# Patient Record
Sex: MALE | Race: WHITE | NOT HISPANIC OR LATINO | Employment: FULL TIME | URBAN - METROPOLITAN AREA
[De-identification: names, ages, dates, MRNs, and addresses within clinical notes are randomized per-mention and may not be internally consistent; named-entity substitution may affect disease eponyms.]

---

## 2017-01-19 ENCOUNTER — HOSPITAL ENCOUNTER (OUTPATIENT)
Dept: LAB | Facility: MEDICAL CENTER | Age: 52
End: 2017-01-19
Attending: NURSE PRACTITIONER
Payer: OTHER GOVERNMENT

## 2017-01-19 DIAGNOSIS — Z12.5 SCREENING PSA (PROSTATE SPECIFIC ANTIGEN): ICD-10-CM

## 2017-01-19 DIAGNOSIS — E78.5 HYPERLIPIDEMIA: Chronic | ICD-10-CM

## 2017-01-19 DIAGNOSIS — I10 ESSENTIAL HYPERTENSION: ICD-10-CM

## 2017-01-19 LAB
ALBUMIN SERPL BCP-MCNC: 4.3 G/DL (ref 3.2–4.9)
ALBUMIN/GLOB SERPL: 1.4 G/DL
ALP SERPL-CCNC: 113 U/L (ref 30–99)
ALT SERPL-CCNC: 24 U/L (ref 2–50)
ANION GAP SERPL CALC-SCNC: 7 MMOL/L (ref 0–11.9)
AST SERPL-CCNC: 25 U/L (ref 12–45)
BILIRUB SERPL-MCNC: 0.4 MG/DL (ref 0.1–1.5)
BUN SERPL-MCNC: 22 MG/DL (ref 8–22)
CALCIUM SERPL-MCNC: 9 MG/DL (ref 8.5–10.5)
CHLORIDE SERPL-SCNC: 103 MMOL/L (ref 96–112)
CHOLEST SERPL-MCNC: 163 MG/DL (ref 100–199)
CO2 SERPL-SCNC: 26 MMOL/L (ref 20–33)
CREAT SERPL-MCNC: 0.93 MG/DL (ref 0.5–1.4)
GLOBULIN SER CALC-MCNC: 3 G/DL (ref 1.9–3.5)
GLUCOSE SERPL-MCNC: 94 MG/DL (ref 65–99)
HDLC SERPL-MCNC: 31 MG/DL
LDLC SERPL CALC-MCNC: 111 MG/DL
POTASSIUM SERPL-SCNC: 4 MMOL/L (ref 3.6–5.5)
PROT SERPL-MCNC: 7.3 G/DL (ref 6–8.2)
PSA SERPL DL<=0.01 NG/ML-MCNC: 0.3 NG/ML (ref 0–4)
SODIUM SERPL-SCNC: 136 MMOL/L (ref 135–145)
TRIGL SERPL-MCNC: 106 MG/DL (ref 0–149)

## 2017-01-19 PROCEDURE — 84153 ASSAY OF PSA TOTAL: CPT

## 2017-01-19 PROCEDURE — 80061 LIPID PANEL: CPT

## 2017-01-19 PROCEDURE — 80053 COMPREHEN METABOLIC PANEL: CPT

## 2017-01-19 PROCEDURE — 36415 COLL VENOUS BLD VENIPUNCTURE: CPT

## 2017-01-19 NOTE — TELEPHONE ENCOUNTER
Was the patient seen in the last year in this department? Yes 08/30/2016    Does patient have an active prescription for medications requested? No     Received Request Via: Patient

## 2017-01-20 ENCOUNTER — TELEPHONE (OUTPATIENT)
Dept: MEDICAL GROUP | Facility: PHYSICIAN GROUP | Age: 52
End: 2017-01-20

## 2017-01-20 DIAGNOSIS — E78.5 HYPERLIPIDEMIA, UNSPECIFIED HYPERLIPIDEMIA TYPE: ICD-10-CM

## 2017-01-20 RX ORDER — LISINOPRIL AND HYDROCHLOROTHIAZIDE 25; 20 MG/1; MG/1
TABLET ORAL
Qty: 90 TAB | Refills: 0 | Status: SHIPPED | OUTPATIENT
Start: 2017-01-20 | End: 2017-04-16 | Stop reason: SDUPTHER

## 2017-01-20 NOTE — TELEPHONE ENCOUNTER
----- Message from MENDEZ Kumar sent at 1/20/2017 12:24 PM PST -----  Please call patient. I have reviewed their most recent lab results. They are within normal limits except for slightly elevated cholesterol and alkaline phosphatase. No medications are necessary at this time, but labs may be repeated in 3 months.  Followup as discussed.    Please message me with any questions or concerns.

## 2017-01-20 NOTE — TELEPHONE ENCOUNTER
Refill X 6 months, sent to pharmacy.Pt. Seen in the last 6 months per protocol.   Lab Results   Component Value Date/Time    SODIUM 136 01/19/2017 09:41 AM    POTASSIUM 4.0 01/19/2017 09:41 AM    CHLORIDE 103 01/19/2017 09:41 AM    CO2 26 01/19/2017 09:41 AM    GLUCOSE 94 01/19/2017 09:41 AM    BUN 22 01/19/2017 09:41 AM    CREATININE 0.93 01/19/2017 09:41 AM

## 2017-02-06 ENCOUNTER — OFFICE VISIT (OUTPATIENT)
Dept: URGENT CARE | Facility: CLINIC | Age: 52
End: 2017-02-06
Payer: OTHER GOVERNMENT

## 2017-02-06 ENCOUNTER — APPOINTMENT (OUTPATIENT)
Dept: RADIOLOGY | Facility: IMAGING CENTER | Age: 52
End: 2017-02-06
Attending: NURSE PRACTITIONER
Payer: OTHER GOVERNMENT

## 2017-02-06 VITALS
BODY MASS INDEX: 33.2 KG/M2 | SYSTOLIC BLOOD PRESSURE: 120 MMHG | TEMPERATURE: 98.4 F | HEIGHT: 75 IN | WEIGHT: 267 LBS | OXYGEN SATURATION: 98 % | DIASTOLIC BLOOD PRESSURE: 60 MMHG | HEART RATE: 100 BPM | RESPIRATION RATE: 20 BRPM

## 2017-02-06 DIAGNOSIS — R11.0 NAUSEA: ICD-10-CM

## 2017-02-06 DIAGNOSIS — K59.00 CONSTIPATION, UNSPECIFIED CONSTIPATION TYPE: ICD-10-CM

## 2017-02-06 PROCEDURE — 99213 OFFICE O/P EST LOW 20 MIN: CPT | Performed by: NURSE PRACTITIONER

## 2017-02-06 PROCEDURE — 74020 DX-ABDOMEN-2 VIEWS: CPT | Mod: TC | Performed by: NURSE PRACTITIONER

## 2017-02-06 RX ORDER — ONDANSETRON 4 MG/1
4 TABLET, FILM COATED ORAL EVERY 6 HOURS PRN
Qty: 12 TAB | Refills: 0 | Status: SHIPPED | OUTPATIENT
Start: 2017-02-06 | End: 2017-10-18

## 2017-02-06 ASSESSMENT — ENCOUNTER SYMPTOMS
DIARRHEA: 0
SORE THROAT: 0
MYALGIAS: 0
CONSTIPATION: 1
NAUSEA: 1
FEVER: 0
VOMITING: 0
BRUISES/BLEEDS EASILY: 0
CHILLS: 0
COUGH: 0
ABDOMINAL PAIN: 0

## 2017-02-06 NOTE — MR AVS SNAPSHOT
"        Dwight Linares   2017 10:30 AM   Office Visit   MRN: [id number]    Department:  [facility]   Dept Phone:  [phone]    Description:  Male : 1965   Provider:  FRANCES Camarena           Reason for Visit     Abdominal Pain aabd pain and bloating, some diarrhea      Allergies as of 2017     No Known Allergies      You were diagnosed with     Nausea   [363731]       Constipation, unspecified constipation type   [4273061]         Vital Signs     Blood Pressure Pulse Temperature Respirations Height Weight    120/60 mmHg 100 36.9 °C (98.4 °F) 20 1.905 m (6' 3\") 121.11 kg (267 lb)    Body Mass Index Oxygen Saturation Smoking Status             33.37 kg/m2 98% Former Smoker         Basic Information     Date Of Birth Sex Race Ethnicity Preferred Language    1965 Male White Non- English      Problem List              ICD-10-CM Priority Class Noted - Resolved    Hypertension I10   10/14/2014 - Present    Rash R21   10/14/2014 - Present    Chronic low back pain M54.5, G89.29   10/14/2014 - Present    Hyperlipidemia E78.5   10/14/2014 - Present    Environmental allergies Z91.09   2016 - Present      Health Maintenance        Date Due Completion Dates    IMM DTaP/Tdap/Td Vaccine (1 - Tdap) 10/24/1983 ---    IMM INFLUENZA (1) 2016 10/14/2014    COLONOSCOPY 2021            Current Immunizations     Influenza Vaccine Quad Inj (Pf) 10/14/2014      Below and/or attached are the medications your provider expects you to take. Review all of your home medications and newly ordered medications with your provider and/or pharmacist. Follow medication instructions as directed by your provider and/or pharmacist. Please keep your medication list with you and share with your provider. Update the information when medications are discontinued, doses are changed, or new medications (including over-the-counter products) are added; and carry medication information at all " times in the event of emergency situations     Allergies:  No Known Allergies          Medications  Valid as of: February 06, 2017 - 11:28 AM    Generic Name Brand Name Tablet Size Instructions for use    Lisinopril-Hydrochlorothiazide (Tab) PRINZIDE, ZESTORETIC 20-25 MG TAKE 1 TABLET BY MOUTH DAILY        Naproxen (Tab) NAPROSYN 500 MG Take 1 Tab by mouth 2 times a day, with meals.        .                 Medicines prescribed today were sent to:     [id number] - [place], [place] - [address]    [address] [place] [address]    Phone: [phone] Fax: [phone]    Open 24 Hours?: No      Medication refill instructions:       If your prescription bottle indicates you have medication refills left, it is not necessary to call your provider’s office. Please contact your pharmacy and they will refill your medication.    If your prescription bottle indicates you do not have any refills left, you may request refills at any time through one of the following ways: The online Workhint system (except Urgent Care), by calling your provider’s office, or by asking your pharmacy to contact your provider’s office with a refill request. Medication refills are processed only during regular business hours and may not be available until the next business day. Your provider may request additional information or to have a follow-up visit with you prior to refilling your medication.   *Please Note: Medication refills are assigned a new Rx number when refilled electronically. Your pharmacy may indicate that no refills were authorized even though a new prescription for the same medication is available at the pharmacy. Please request the medicine by name with the pharmacy before contacting your provider for a refill.        Your To Do List     Future Labs/Procedures Complete By Expires    [id number]-[id number]-0 VIEWS  As directed 8/9/2017         Workhint Access Code: [email or web address]  Expires: 2/18/2017  9:30 AM    Tamara MILLIGAN  secure, online tool to manage your health information     [employer]’s Moneylib® is a secure, online tool that connects you to your personalized health information from the privacy of your home -- day or night - making it very easy for you to manage your healthcare. Once the activation process is completed, you can even access your medical information using the Moneylib poppy, which is available for free in the Apple Poppy store or Google Play store.     Moneylib provides the following levels of access (as shown below):   My Chart Features   [facility] Primary Care Doctor [facility]  Specialists [facility]  Urgent  Care [facility]  Primary Care  Doctor   Email your healthcare team securely and privately 24/7 X X X    Manage appointments: schedule your next appointment; view details of past/upcoming appointments X      Request prescription refills. X      View recent personal medical records, including lab and immunizations X X X X   View health record, including health history, allergies, medications X X X X   Read reports about your outpatient visits, procedures, consult and ER notes X X X X   See your discharge summary, which is a recap of your hospital and/or ER visit that includes your diagnosis, lab results, and care plan. X X       How to register for Moneylib:  1. Go to  [email or web address].  2. Click on the Sign Up Now box, which takes you to the New Member Sign Up page. You will need to provide the following information:  a. Enter your Moneylib Access Code exactly as it appears at the top of this page. (You will not need to use this code after you’ve completed the sign-up process. If you do not sign up before the expiration date, you must request a new code.)   b. Enter your date of birth.   c. Enter your home email address.   d. Click Submit, and follow the next screen’s instructions.  3. Create a Moneylib ID. This will be your Moneylib login ID and cannot be changed, so think of one that is secure and easy to  remember.  4. Create a [employer] password. You can change your password at any time.  5. Enter your Password Reset Question and Answer. This can be used at a later time if you forget your password.   6. Enter your e-mail address. This allows you to receive e-mail notifications when new information is available in [employer].  7. Click Sign Up. You can now view your health information.    For assistance activating your [employer]MeetEast account, call [phone]

## 2017-02-06 NOTE — PROGRESS NOTES
"Subjective:      Dwight Linares is a 51 y.o. male who presents with Abdominal Pain            HPI Comments: Patient states approximately 5 days ago he had some abdominal pain and bloating. He took MiraLAX for several days and had diarrhea. Patient states he believes he was dehydrated and drank a large amount of liquid yesterday. Now he is complaining of nausea and lack of energy. Patient is now having nausea without any pain. He is having normal stools. Not eating because of loss of appetite.    Medications, Allergies and Prior Medical Hx reviewed and updated in [facility].with patient/family today   Patient denies any previous abdominal problems or surgeries.    Nausea  This is a new problem. The current episode started in the past 7 days (5 days). The problem occurs constantly. The problem has been unchanged. Associated symptoms include nausea. Pertinent negatives include no abdominal pain, chest pain, chills, coughing, fever, myalgias, sore throat or vomiting. Nothing aggravates the symptoms. He has tried nothing for the symptoms. The treatment provided no relief.       Review of Systems   Constitutional: Negative for fever, chills and malaise/fatigue.   HENT: Negative for sore throat.    Respiratory: Negative for cough.    Cardiovascular: Negative for chest pain.   Gastrointestinal: Positive for nausea and constipation. Negative for vomiting, abdominal pain and diarrhea.   Genitourinary: Negative for dysuria, frequency and hematuria.   Musculoskeletal: Negative for myalgias.   Endo/Heme/Allergies: Does not bruise/bleed easily.          Objective:     /60 mmHg  Pulse 100  Temp(Src) 36.9 °C (98.4 °F)  Resp 20  Ht 1.905 m (6' 3\")  Wt 121.11 kg (267 lb)  BMI 33.37 kg/m2  SpO2 98%     Physical Exam   Constitutional: He appears well-developed and well-nourished.   HENT:   Head: Normocephalic.   Neck: Normal range of motion. Neck supple.   Pulmonary/Chest: Effort normal and breath sounds normal. No respiratory " distress.   Abdominal: Soft. Bowel sounds are increased. There is no tenderness.   Neurological: He is alert.   Awake, alert, answering questions appropriately, moving all extremeties   Skin: Skin is warm and dry.   Psychiatric: He has a normal mood and affect. His behavior is normal.   Vitals reviewed.              Assessment/Plan:     1. Nausea  [id number] VIEWS    ondansetron (ZOFRAN) 4 MG Tab tablet   2. Constipation, unspecified constipation type  [id number] VIEWS    ondansetron (ZOFRAN) 4 MG Tab tablet       Xray of abdomen 2 view -  Reviewed film and radiology interpretation:   There is a nonobstructive nonspecific bowel gas pattern.  There is no evidence of free intraperitoneal air.    Letter written for 2-3 days off of school/work    Clear Liquid Diet adv as tolerated to bland solid food and then to normal diet  Pt will go to the ER for worsening or changing symptoms as discussed, fevers, vomiting, worsening pain, or any other concerns  Follow-up with your primary care provider or return here if not improving in 2 days   Discharge instructions discussed with pt/family who verbalize understanding and agreement with poc

## 2017-02-06 NOTE — Clinical Note
February 6, 2017       Patient: Dwight Linares   YOB: 1965   Date of Visit: 2/6/2017         To Whom It May Concern:    It is my medical opinion that Dwight Linares should be off work 1-2 days due to illness.     If you have any questions or concerns, please don't hesitate to call [phone]          Sincerely,          MOE Camarena.  Electronically Signed

## 2017-08-09 RX ORDER — LISINOPRIL AND HYDROCHLOROTHIAZIDE 25; 20 MG/1; MG/1
TABLET ORAL
Qty: 90 TAB | Refills: 0 | Status: SHIPPED | OUTPATIENT
Start: 2017-08-09 | End: 2017-10-18 | Stop reason: SDUPTHER

## 2017-08-09 NOTE — TELEPHONE ENCOUNTER
Was the patient seen in the last year in this department? Yes     Does patient have an active prescription for medications requested? No     Received Request Via: Pharmacy      Pt met protocol?: No    LAST OV 08/30/2016    BP Readings from Last 1 Encounters:   02/06/17 120/60

## 2017-09-11 ENCOUNTER — TELEPHONE (OUTPATIENT)
Dept: MEDICAL GROUP | Facility: PHYSICIAN GROUP | Age: 52
End: 2017-09-11

## 2017-09-11 NOTE — TELEPHONE ENCOUNTER
ESTABLISHED PATIENT PRE-VISIT PLANNING     Note: Patient will not be contacted if there is no indication to call.     1.  Reviewed notes from the last few office visits within the medical group: Yes    2.  If any orders were placed at last visit or intended to be done for this visit (i.e. 6 mos follow-up), do we have Results/Consult Notes?        •  Labs - Labs ordered, completed and results are in chart.       •  Imaging - Imaging ordered, completed and results are in chart.       •  Referrals - Referral ordered, patient has NOT been seen.    3. Is this appointment scheduled as a Hospital Follow-Up? No    4.  Immunizations were updated in Epic using WebIZ?: Epic matches WebIZ       •  Web Iz Recommendations: FLU and TDAP    5.  Patient is due for the following Health Maintenance Topics:   Health Maintenance Due   Topic Date Due   • IMM DTaP/Tdap/Td Vaccine (1 - Tdap) 10/24/1983   • IMM INFLUENZA (1) 09/01/2017           6.  Patient was informed to arrive 15 min prior to their scheduled appointment and bring in their medication bottles.

## 2017-10-18 ENCOUNTER — OFFICE VISIT (OUTPATIENT)
Dept: MEDICAL GROUP | Facility: PHYSICIAN GROUP | Age: 52
End: 2017-10-18
Payer: OTHER GOVERNMENT

## 2017-10-18 VITALS
SYSTOLIC BLOOD PRESSURE: 110 MMHG | TEMPERATURE: 98.2 F | HEART RATE: 80 BPM | BODY MASS INDEX: 32.45 KG/M2 | RESPIRATION RATE: 16 BRPM | DIASTOLIC BLOOD PRESSURE: 72 MMHG | WEIGHT: 261 LBS | OXYGEN SATURATION: 96 % | HEIGHT: 75 IN

## 2017-10-18 DIAGNOSIS — Z23 NEED FOR INFLUENZA VACCINATION: ICD-10-CM

## 2017-10-18 DIAGNOSIS — I10 ESSENTIAL HYPERTENSION: ICD-10-CM

## 2017-10-18 DIAGNOSIS — Z80.42 FAMILY HISTORY OF PROSTATE CANCER IN FATHER: ICD-10-CM

## 2017-10-18 DIAGNOSIS — L30.9 ECZEMA, UNSPECIFIED TYPE: ICD-10-CM

## 2017-10-18 DIAGNOSIS — N52.9 ERECTILE DYSFUNCTION, UNSPECIFIED ERECTILE DYSFUNCTION TYPE: ICD-10-CM

## 2017-10-18 DIAGNOSIS — E66.9 OBESITY (BMI 30-39.9): ICD-10-CM

## 2017-10-18 PROCEDURE — 99214 OFFICE O/P EST MOD 30 MIN: CPT | Mod: 25 | Performed by: NURSE PRACTITIONER

## 2017-10-18 PROCEDURE — 90471 IMMUNIZATION ADMIN: CPT | Performed by: NURSE PRACTITIONER

## 2017-10-18 PROCEDURE — 90686 IIV4 VACC NO PRSV 0.5 ML IM: CPT | Performed by: NURSE PRACTITIONER

## 2017-10-18 RX ORDER — FEXOFENADINE HCL 60 MG/1
60 TABLET, FILM COATED ORAL DAILY
COMMUNITY
End: 2019-11-21

## 2017-10-18 RX ORDER — LISINOPRIL AND HYDROCHLOROTHIAZIDE 25; 20 MG/1; MG/1
1 TABLET ORAL
Qty: 90 TAB | Refills: 1 | Status: SHIPPED | OUTPATIENT
Start: 2017-10-18 | End: 2018-05-11 | Stop reason: SDUPTHER

## 2017-10-18 RX ORDER — SILDENAFIL 50 MG/1
50 TABLET, FILM COATED ORAL PRN
Qty: 5 TAB | Refills: 3 | Status: SHIPPED | OUTPATIENT
Start: 2017-10-18 | End: 2019-11-21

## 2017-10-18 RX ORDER — TRIAMCINOLONE ACETONIDE 1 MG/G
CREAM TOPICAL
Qty: 30 G | Refills: 1 | Status: SHIPPED | OUTPATIENT
Start: 2017-10-18 | End: 2017-11-15 | Stop reason: SDUPTHER

## 2017-10-18 RX ORDER — CHLORAL HYDRATE 500 MG
1000 CAPSULE ORAL
COMMUNITY
End: 2022-05-11

## 2017-10-18 ASSESSMENT — PATIENT HEALTH QUESTIONNAIRE - PHQ9: CLINICAL INTERPRETATION OF PHQ2 SCORE: 0

## 2017-10-18 NOTE — PATIENT INSTRUCTIONS
Kenalog cream for eczema--apply to affected area twice daily for no more than 4 weeks at a time    Viagra, 50 mg daily--ER precautions    Labs anytime in the next week    Flu shot today    Referral to dermatology    Follow up with me in 3 months, sooner if needed

## 2017-10-18 NOTE — ASSESSMENT & PLAN NOTE
This is a chronic condition, uncontrolled. Patient's weight is 261 pounds, BMI 32.62. He reports to me that he understands the risks associated with this weight and he struggles to lose weight. We did discussed several strategies for weight loss, but ultimately discussed that to lose weight. Needs to be a decrease in calories as well as carbohydrates. He understands that he needs to increase his physical activity as well. He states he is going to work on this and we will reassess at follow-up appointment.

## 2017-10-18 NOTE — ASSESSMENT & PLAN NOTE
Patient reports is a relatively new issue. He is newly , but he and his wife live in separate states for now until she can move here. He reports that when he is with her though he does not have difficulty initiating an erection, but does have difficulty maintaining. He is concerned that his medications may be causing this. He does not report any urinary symptoms, but does report a family history of prostate cancer in a first-degree relative--specifically his father.

## 2017-10-18 NOTE — ASSESSMENT & PLAN NOTE
Patient reports family history of prostate cancer in first-degree relative, specifically his father. He is wanting to know if he needs PSA screening. He does deny any symptoms of prostate cancer, he denies any urinary symptoms.

## 2017-10-18 NOTE — ASSESSMENT & PLAN NOTE
This is a chronic condition, stable, poor to fair control as he has not been using steroid cream consistently. He has been on steroid cream in the past as prescribed by his dermatologist in [place]. He does need new prescription, triamcinolone 0.1% was called into his pharmacy. He was advised to use this twice a day, for no more than 4 weeks time. He is also requesting referral to new dermatologist, this was placed for him. He was also advised to keep skin well moisturized.

## 2017-10-18 NOTE — ASSESSMENT & PLAN NOTE
This is a chronic condition, stable and fairly well-controlled on lisinopril-hydrochlorothiazide. Blood pressure today is 110/72 and he denies symptoms of hypertension. He is due for labs, these have been ordered. He does need refills, these were called in for him.

## 2017-10-18 NOTE — PROGRESS NOTES
Chief Complaint   Patient presents with   • Referral Needed     rash, referral to Derm, weight loss   • Personal Problem         This is a 51 y.o.male patient that presents today with the following: Establish care with new PCP, discuss acute and chronic conditions, medication refills    Eczema  This is a chronic condition, stable, poor to fair control as he has not been using steroid cream consistently. He has been on steroid cream in the past as prescribed by his dermatologist in [place]. He does need new prescription, triamcinolone 0.1% was called into his pharmacy. He was advised to use this twice a day, for no more than 4 weeks time. He is also requesting referral to new dermatologist, this was placed for him. He was also advised to keep skin well moisturized.    Erectile dysfunction  Patient reports is a relatively new issue. He is newly , but he and his wife live in separate states for now until she can move here. He reports that when he is with her though he does not have difficulty initiating an erection, but does have difficulty maintaining. He is concerned that his medications may be causing this. He does not report any urinary symptoms, but does report a family history of prostate cancer in a first-degree relative--specifically his father.    Family history of prostate cancer in father  Patient reports family history of prostate cancer in first-degree relative, specifically his father. He is wanting to know if he needs PSA screening. He does deny any symptoms of prostate cancer, he denies any urinary symptoms.    Hypertension  This is a chronic condition, stable and fairly well-controlled on lisinopril-hydrochlorothiazide. Blood pressure today is 110/72 and he denies symptoms of hypertension. He is due for labs, these have been ordered. He does need refills, these were called in for him.    Obesity (BMI 30-39.9)  This is a chronic condition, uncontrolled. Patient's weight is 261 pounds, BMI 32.62. He  reports to me that he understands the risks associated with this weight and he struggles to lose weight. We did discussed several strategies for weight loss, but ultimately discussed that to lose weight. Needs to be a decrease in calories as well as carbohydrates. He understands that he needs to increase his physical activity as well. He states he is going to work on this and we will reassess at follow-up appointment.      No visits with results within 1 Month(s) from this visit.   Latest known visit with results is:   Hospital Outpatient Visit on 01/19/2017   Component Date Value   • Sodium 01/19/2017 136    • Potassium 01/19/2017 4.0    • Chloride 01/19/2017 103    • Co2 01/19/2017 26    • Anion Gap 01/19/2017 7.0    • Glucose 01/19/2017 94    • Bun 01/19/2017 22    • Creatinine 01/19/2017 0.93    • Calcium 01/19/2017 9.0    • AST(SGOT) 01/19/2017 25    • ALT(SGPT) 01/19/2017 24    • Alkaline Phosphatase 01/19/2017 113*   • Total Bilirubin 01/19/2017 0.4    • Albumin 01/19/2017 4.3    • Total Protein 01/19/2017 7.3    • Globulin 01/19/2017 3.0    • A-G Ratio 01/19/2017 1.4    • Cholesterol,Tot 01/19/2017 163    • Triglycerides 01/19/2017 106    • HDL 01/19/2017 31*   • LDL 01/19/2017 111*   • Prostatic Specific Antig* 01/19/2017 0.30    • GFR If  01/19/2017 >60    • GFR If Non  Ameri* 01/19/2017 >60          clinical course has been stable    Past Medical History:   Diagnosis Date   • Hypertension 10/14/2014       Past Surgical History:   Procedure Laterality Date   • DENTAL SURGERY         Family History   Problem Relation Age of Onset   • Heart Disease Father      dysrhythmia with pacer   • Cancer Father      skin cancer   • Cancer Maternal Grandfather      leukemia   • Alcohol/Drug Paternal Grandfather    • Diabetes Neg Hx    • Stroke Neg Hx        Review of patient's allergies indicates no known allergies.    Current Outpatient Prescriptions Ordered in [facility]   Medication Sig Dispense  "Refill   • Omega-3 Fatty Acids (FISH OIL) 1000 MG Cap capsule Take 1,000 mg by mouth 3 times a day, with meals.     • fexofenadine (ALLEGRA) 60 MG Tab Take 60 mg by mouth every day.     • triamcinolone acetonide (KENALOG) 0.1 % Cream Apply to affected area twice daily, no more than 4 weeks at a time 30 g 1   • sildenafil citrate (VIAGRA) 50 MG tablet Take 1 Tab by mouth as needed for Erectile Dysfunction. 5 Tab 3   • lisinopril-hydrochlorothiazide (PRINZIDE, ZESTORETIC) 20-25 MG per tablet Take 1 Tab by mouth every day. 90 Tab 1     No current Epic-ordered facility-administered medications on file.        Constitutional ROS: No unexpected change in weight, No weakness, No unexplained fevers, sweats, or chills  Pulmonary ROS: No chronic cough, sputum, or hemoptysis, No shortness of breath, No recent change in breathing  Cardiovascular ROS: No chest pain, No edema, No palpitations, Positive for hypertension, Controlled  Gastrointestinal ROS: No abdominal pain, No nausea, vomiting, diarrhea, or constipation, no blood in stool  Musculoskeletal/Extremities ROS: No clubbing, No peripheral edema, No pain, redness or swelling on the joints  Skin/Integumentary ROS: Positive for eczema, per history of present illness  Neurologic ROS: Normal development, No seizures, No weakness  Genitourinary ROS: Positive per history of present illness    Physical exam:  /72   Pulse 80   Temp 36.8 °C (98.2 °F)   Resp 16   Ht 1.905 m (6' 3\")   Wt 118.4 kg (261 lb)   SpO2 96%   BMI 32.62 kg/m²   General Appearance: Middle-age male, alert, no distress, obese, well-groomed  Skin: positives: Rash, likely eczematous, mostly to upper extremities and upper back  Lungs: negative findings: normal respiratory rate and rhythm, lungs clear to auscultation  Heart: negative. RRR without murmur, gallop, or rubs.  No ectopy.  Abdomen: Abdomen soft, non-tender. BS normal. No masses,  No organomegaly  Musculoskeletal: negative findings: ROM of all " joints is normal, no evidence of joint instability, strength normal, no deformities present  Neurologic: intact, oriented, mood appropriate, judgment intact. Cranial nerves II-12 grossly intact    Medical decision making/discussion: Called and Kenalog cream for eczema, he is to use this twice a day for no more than 4 weeks at a time. Referral has been placed to dermatology. He is to have labs done in the next week and I will notify him of results and further actions if needed. He will get flu shot today. We'll have him try Viagra, 50 mg as needed. I did discuss risks, benefits and side effects of medication and he was given ER precautions for prolonged erection. We discussed weight loss strategies, he is going to work on decreasing calories, and carbohydrates. He is going to try to increase his physical activity as well.    Dwight was seen today for referral needed and personal problem.    Diagnoses and all orders for this visit:    Essential hypertension  -     COMP METABOLIC PANEL; Future  -     LIPID PROFILE; Future  -     lisinopril-hydrochlorothiazide (PRINZIDE, ZESTORETIC) 20-25 MG per tablet; Take 1 Tab by mouth every day.    Eczema, unspecified type  -     triamcinolone acetonide (KENALOG) 0.1 % Cream; Apply to affected area twice daily, no more than 4 weeks at a time  -     REFERRAL TO DERMATOLOGY    Erectile dysfunction, unspecified erectile dysfunction type  -     PROSTATE SPECIFIC AG DIAGNOSTIC; Future  -     TESTOSTERONE, FREE AND TOTAL  -     sildenafil citrate (VIAGRA) 50 MG tablet; Take 1 Tab by mouth as needed for Erectile Dysfunction.    Family history of prostate cancer in father  -     PROSTATE SPECIFIC AG DIAGNOSTIC; Future    Obesity (BMI 30-39.9)  -     Patient identified as having weight management issue.  Appropriate orders and counseling given.    Need for influenza vaccination  -     INFLUENZA VACCINE QUAD INJ >3Y(PF)          Please note that this dictation was created using voice  recognition software. I have made every reasonable attempt to correct obvious errors, but I expect that there are errors of grammar and possibly content that I did not discover before finalizing the note.

## 2017-10-20 ENCOUNTER — HOSPITAL ENCOUNTER (OUTPATIENT)
Dept: LAB | Facility: MEDICAL CENTER | Age: 52
End: 2017-10-20
Attending: NURSE PRACTITIONER
Payer: OTHER GOVERNMENT

## 2017-10-20 DIAGNOSIS — Z80.42 FAMILY HISTORY OF PROSTATE CANCER IN FATHER: ICD-10-CM

## 2017-10-20 DIAGNOSIS — N52.9 ERECTILE DYSFUNCTION, UNSPECIFIED ERECTILE DYSFUNCTION TYPE: ICD-10-CM

## 2017-10-20 DIAGNOSIS — I10 ESSENTIAL HYPERTENSION: ICD-10-CM

## 2017-10-20 LAB
ALBUMIN SERPL BCP-MCNC: 3.8 G/DL (ref 3.2–4.9)
ALBUMIN/GLOB SERPL: 1.3 G/DL
ALP SERPL-CCNC: 117 U/L (ref 30–99)
ALT SERPL-CCNC: 21 U/L (ref 2–50)
ANION GAP SERPL CALC-SCNC: 9 MMOL/L (ref 0–11.9)
AST SERPL-CCNC: 24 U/L (ref 12–45)
BILIRUB SERPL-MCNC: 0.3 MG/DL (ref 0.1–1.5)
BUN SERPL-MCNC: 19 MG/DL (ref 8–22)
CALCIUM SERPL-MCNC: 9 MG/DL (ref 8.5–10.5)
CHLORIDE SERPL-SCNC: 104 MMOL/L (ref 96–112)
CHOLEST SERPL-MCNC: 148 MG/DL (ref 100–199)
CO2 SERPL-SCNC: 25 MMOL/L (ref 20–33)
CREAT SERPL-MCNC: 0.77 MG/DL (ref 0.5–1.4)
GFR SERPL CREATININE-BSD FRML MDRD: >60 ML/MIN/1.73 M 2
GLOBULIN SER CALC-MCNC: 3 G/DL (ref 1.9–3.5)
GLUCOSE SERPL-MCNC: 87 MG/DL (ref 65–99)
HDLC SERPL-MCNC: 32 MG/DL
LDLC SERPL CALC-MCNC: 97 MG/DL
POTASSIUM SERPL-SCNC: 4 MMOL/L (ref 3.6–5.5)
PROT SERPL-MCNC: 6.8 G/DL (ref 6–8.2)
PSA SERPL-MCNC: 0.4 NG/ML (ref 0–4)
SODIUM SERPL-SCNC: 138 MMOL/L (ref 135–145)
TRIGL SERPL-MCNC: 95 MG/DL (ref 0–149)

## 2017-10-20 PROCEDURE — 84153 ASSAY OF PSA TOTAL: CPT

## 2017-10-20 PROCEDURE — 80061 LIPID PANEL: CPT

## 2017-10-20 PROCEDURE — 84403 ASSAY OF TOTAL TESTOSTERONE: CPT

## 2017-10-20 PROCEDURE — 36415 COLL VENOUS BLD VENIPUNCTURE: CPT

## 2017-10-20 PROCEDURE — 84270 ASSAY OF SEX HORMONE GLOBUL: CPT

## 2017-10-20 PROCEDURE — 80053 COMPREHEN METABOLIC PANEL: CPT

## 2017-10-20 PROCEDURE — 84402 ASSAY OF FREE TESTOSTERONE: CPT

## 2017-10-22 LAB
SHBG SERPL-SCNC: 45 NMOL/L (ref 11–80)
TESTOST FREE MFR SERPL: 1.6 % (ref 1.6–2.9)
TESTOST FREE SERPL-MCNC: 89 PG/ML (ref 47–244)
TESTOST SERPL-MCNC: 554 NG/DL (ref 300–890)

## 2017-10-30 ENCOUNTER — TELEPHONE (OUTPATIENT)
Dept: MEDICAL GROUP | Facility: PHYSICIAN GROUP | Age: 52
End: 2017-10-30

## 2018-01-09 DIAGNOSIS — L30.9 ECZEMA, UNSPECIFIED TYPE: ICD-10-CM

## 2018-01-10 RX ORDER — TRIAMCINOLONE ACETONIDE 1 MG/G
CREAM TOPICAL
Qty: 30 G | Refills: 0 | Status: SHIPPED | OUTPATIENT
Start: 2018-01-10 | End: 2018-05-29 | Stop reason: SDUPTHER

## 2018-01-10 NOTE — TELEPHONE ENCOUNTER
Was the patient seen in the last year in this department? Yes     Does patient have an active prescription for medications requested? No     Received Request Via: Pharmacy    Pt met protocol?: Yes     Last OV 10/2017

## 2018-02-28 ENCOUNTER — OFFICE VISIT (OUTPATIENT)
Dept: MEDICAL GROUP | Facility: PHYSICIAN GROUP | Age: 53
End: 2018-02-28
Payer: OTHER GOVERNMENT

## 2018-02-28 VITALS
SYSTOLIC BLOOD PRESSURE: 118 MMHG | WEIGHT: 272 LBS | BODY MASS INDEX: 33.82 KG/M2 | OXYGEN SATURATION: 97 % | RESPIRATION RATE: 16 BRPM | HEIGHT: 75 IN | TEMPERATURE: 97.9 F | DIASTOLIC BLOOD PRESSURE: 80 MMHG | HEART RATE: 74 BPM

## 2018-02-28 DIAGNOSIS — E66.9 OBESITY (BMI 30-39.9): ICD-10-CM

## 2018-02-28 DIAGNOSIS — B35.1 TOENAIL FUNGUS: ICD-10-CM

## 2018-02-28 DIAGNOSIS — I10 ESSENTIAL HYPERTENSION: ICD-10-CM

## 2018-02-28 DIAGNOSIS — Z80.42 FAMILY HISTORY OF PROSTATE CANCER IN FATHER: ICD-10-CM

## 2018-02-28 DIAGNOSIS — R79.89 ELEVATED LIVER FUNCTION TESTS: ICD-10-CM

## 2018-02-28 PROCEDURE — 99214 OFFICE O/P EST MOD 30 MIN: CPT | Performed by: NURSE PRACTITIONER

## 2018-02-28 RX ORDER — BETAMETHASONE DIPROPIONATE 0.5 MG/G
0.05 CREAM TOPICAL
COMMUNITY
Start: 2018-01-05 | End: 2018-10-19

## 2018-02-28 NOTE — ASSESSMENT & PLAN NOTE
This is a chronic condition, stable and well-controlled on current medications including lisinopril-allergic or thiazide. Blood pressure today is 118/80 and he denies symptoms of hypertension. He is up-to-date with labs, but he will be due again in October 2018. He tolerates medications well with no significant or bothersome side effects. He does not need refills at this time, but can call as needed.

## 2018-02-28 NOTE — ASSESSMENT & PLAN NOTE
Patient does have a family history of prostate cancer and a first degree relative, specifically his father. He does have annual PSAs, his last one done in October was within normal limits. We will continue to do this annually. He does continue to deny symptoms.

## 2018-02-28 NOTE — PATIENT INSTRUCTIONS
Work on diet and exercise    Lab any time in the next week    Regular labs in October    Referral to podiatry

## 2018-02-28 NOTE — ASSESSMENT & PLAN NOTE
He was noted with his last labs done in October that he had a mildly elevated alkaline phosphatase of 117. He does drink alcohol on occasion but has not noted any increased use of Tylenol. I also discussed with him other reasons for this number to increase such as illness, Tylenol or NSAID use, weight loss or weight gain, injury or other many unknown causes. We will repeat this test and I will notify him of results and further actions if needed.

## 2018-02-28 NOTE — ASSESSMENT & PLAN NOTE
Patient does have significant toenail fungus to bilateral great toenails. He has marketed thickness and black discoloration to the toenails especially in the distal half of the toenails. He would like referral to podiatry for removal, referral has been placed.

## 2018-02-28 NOTE — PROGRESS NOTES
Chief Complaint   Patient presents with   • Hyperlipidemia     fv labs   • Nail Problem     discolored toe nails         This is a 52 y.o.male patient that presents today with the following: Follow-up visit, review labs, discussing acute and chronic conditions    Hypertension  This is a chronic condition, stable and well-controlled on current medications including lisinopril-allergic or thiazide. Blood pressure today is 118/80 and he denies symptoms of hypertension. He is up-to-date with labs, but he will be due again in October 2018. He tolerates medications well with no significant or bothersome side effects. He does not need refills at this time, but can call as needed.    Obesity (BMI 30-39.9)  This is a chronic condition, uncontrolled. Patient's weight is 272 pounds, BMI 34. He does understand the risks associated with his weight and he continues to work on this. He understands that he needs to increase his physical activity and watch his diet.    Family history of prostate cancer in father  Patient does have a family history of prostate cancer and a first degree relative, specifically his father. He does have annual PSAs, his last one done in October was within normal limits. We will continue to do this annually. He does continue to deny symptoms.    Toenail fungus  Patient does have significant toenail fungus to bilateral great toenails. He has marketed thickness and black discoloration to the toenails especially in the distal half of the toenails. He would like referral to podiatry for removal, referral has been placed.    Elevated liver function tests  He was noted with his last labs done in October that he had a mildly elevated alkaline phosphatase of 117. He does drink alcohol on occasion but has not noted any increased use of Tylenol. I also discussed with him other reasons for this number to increase such as illness, Tylenol or NSAID use, weight loss or weight gain, injury or other many unknown causes.  We will repeat this test and I will notify him of results and further actions if needed.      No visits with results within 1 Month(s) from this visit.   Latest known visit with results is:   Hospital Outpatient Visit on 10/20/2017   Component Date Value   • Sodium 10/20/2017 138    • Potassium 10/20/2017 4.0    • Chloride 10/20/2017 104    • Co2 10/20/2017 25    • Anion Gap 10/20/2017 9.0    • Glucose 10/20/2017 87    • Bun 10/20/2017 19    • Creatinine 10/20/2017 0.77    • Calcium 10/20/2017 9.0    • AST(SGOT) 10/20/2017 24    • ALT(SGPT) 10/20/2017 21    • Alkaline Phosphatase 10/20/2017 117*   • Total Bilirubin 10/20/2017 0.3    • Albumin 10/20/2017 3.8    • Total Protein 10/20/2017 6.8    • Globulin 10/20/2017 3.0    • A-G Ratio 10/20/2017 1.3    • Cholesterol,Tot 10/20/2017 148    • Triglycerides 10/20/2017 95    • HDL 10/20/2017 32*   • LDL 10/20/2017 97    • Prostatic Specific Antig* 10/20/2017 0.40    • Testosterone,Total 10/20/2017 554    • Sex Hormone Bind Globulin 10/20/2017 45    • Free Testosterone 10/20/2017 89    • Testosterone % Free 10/20/2017 1.6    • GFR If  10/20/2017 >60    • GFR If Non  Ameri* 10/20/2017 >60          clinical course has been stable    Past Medical History:   Diagnosis Date   • Hypertension 10/14/2014       Past Surgical History:   Procedure Laterality Date   • DENTAL SURGERY         Family History   Problem Relation Age of Onset   • Heart Disease Father      dysrhythmia with pacer   • Cancer Father      skin cancer   • Cancer Maternal Grandfather      leukemia   • Alcohol/Drug Paternal Grandfather    • Diabetes Neg Hx    • Stroke Neg Hx        Patient has no known allergies.    Current Outpatient Prescriptions Ordered in [facility]   Medication Sig Dispense Refill   • triamcinolone acetonide (KENALOG) 0.1 % Cream APPLY TO THE AFFECTED AREA TWICE DAILY FOR NO MORE THAN 4 WEEKS AT A TIME 30 g 0   • Omega-3 Fatty Acids (FISH OIL) 1000 MG Cap capsule Take 1,000 mg  "by mouth 3 times a day, with meals.     • fexofenadine (ALLEGRA) 60 MG Tab Take 60 mg by mouth every day.     • sildenafil citrate (VIAGRA) 50 MG tablet Take 1 Tab by mouth as needed for Erectile Dysfunction. 5 Tab 3   • lisinopril-hydrochlorothiazide (PRINZIDE, ZESTORETIC) 20-25 MG per tablet Take 1 Tab by mouth every day. 90 Tab 1   • Aug Betamethasone Dipropionate (DIPROLENE-AF) 0.05 % Cream Apply 0.05 g to affected area(s) 1 time daily as needed.       No current Epic-ordered facility-administered medications on file.        Constitutional ROS: No unexpected change in weight, No weakness, No unexplained fevers, sweats, or chills  Pulmonary ROS: No chronic cough, sputum, or hemoptysis, No shortness of breath, No recent change in breathing  Cardiovascular ROS: No chest pain, No edema, No palpitations, Positive for hypertension, controlled  Gastrointestinal ROS: No abdominal pain, No nausea, vomiting, diarrhea, or constipation, Positive for elevated liver test, per history of present illness  Musculoskeletal/Extremities ROS: No clubbing, No peripheral edema, No pain, redness or swelling on the joints  Skin/Integumentary ROS: Positive for toenail fungus, per history of present illness  Neurologic ROS: Normal development, No seizures, No weakness  Genitourinary ROS: Positive per history of present illness    Physical exam:  /80   Pulse 74   Temp 36.6 °C (97.9 °F)   Resp 16   Ht 1.905 m (6' 3\")   Wt 123.4 kg (272 lb)   SpO2 97%   BMI 34.00 kg/m²   General Appearance: Middle-age male, alert, no distress, obese, well-groomed  Skin: Skin color, texture, turgor normal. No rashes or lesions. Positive for bilateral great toe toenail fungus  Lungs: negative findings: normal respiratory rate and rhythm, lungs clear to auscultation  Heart: negative. RRR without murmur, gallop, or rubs.  No ectopy.  Abdomen: Abdomen soft, non-tender. BS normal. No masses,  No organomegaly  Musculoskeletal: negative findings: ROM of " all joints is normal, strength normal, no deformities present  Neurologic: intact, oriented, mood appropriate, judgment intact. Cranial nerves II through XII grossly intact    Medical decision making/discussion: Patient has been referred to podiatry for further evaluation of toenail fungus and likely removal of toenail. He is to have repeat hepatic panel done anytime in the next week, he will be notified of results and further actions if needed. He is to have his regular labs as well as diagnostic PSA done in October, he is to follow-up with me after labs are done at that time. He is to work on healthy diet, regular physical activity and continued efforts towards weight loss.    Dwight was seen today for hyperlipidemia and nail problem.    Diagnoses and all orders for this visit:    Toenail fungus  -     REFERRAL TO PODIATRY    Elevated liver function tests  -     HEPATIC FUNCTION PANEL; Future    Family history of prostate cancer in father    Obesity (BMI 30-39.9)    Essential hypertension          Please note that this dictation was created using voice recognition software. I have made every reasonable attempt to correct obvious errors, but I expect that there are errors of grammar and possibly content that I did not discover before finalizing the note.

## 2018-02-28 NOTE — ASSESSMENT & PLAN NOTE
This is a chronic condition, uncontrolled. Patient's weight is 272 pounds, BMI 34. He does understand the risks associated with his weight and he continues to work on this. He understands that he needs to increase his physical activity and watch his diet.

## 2018-05-11 DIAGNOSIS — I10 ESSENTIAL HYPERTENSION: ICD-10-CM

## 2018-05-11 NOTE — TELEPHONE ENCOUNTER
Was the patient seen in the last year in this department? Yes     Does patient have an active prescription for medications requested? No     Received Request Via: Pharmacy    Pt met protocol?: Yes     Last OV 02/2018  BP Readings from Last 1 Encounters:   02/28/18 118/80

## 2018-05-13 RX ORDER — LISINOPRIL AND HYDROCHLOROTHIAZIDE 25; 20 MG/1; MG/1
TABLET ORAL
Qty: 90 TAB | Refills: 1 | Status: SHIPPED | OUTPATIENT
Start: 2018-05-13 | End: 2018-11-25 | Stop reason: SDUPTHER

## 2018-05-13 NOTE — TELEPHONE ENCOUNTER
Refill X 6 months, sent to pharmacy.Pt. Seen in the last 6 months per protocol.   Lab Results   Component Value Date/Time    SODIUM 138 10/20/2017 08:17 AM    POTASSIUM 4.0 10/20/2017 08:17 AM    CHLORIDE 104 10/20/2017 08:17 AM    CO2 25 10/20/2017 08:17 AM    GLUCOSE 87 10/20/2017 08:17 AM    BUN 19 10/20/2017 08:17 AM    CREATININE 0.77 10/20/2017 08:17 AM       BP Readings from Last 1 Encounters:   02/28/18 118/80

## 2018-05-29 DIAGNOSIS — L30.9 ECZEMA, UNSPECIFIED TYPE: ICD-10-CM

## 2018-05-30 RX ORDER — TRIAMCINOLONE ACETONIDE 1 MG/G
CREAM TOPICAL
Qty: 30 G | Refills: 0 | Status: SHIPPED | OUTPATIENT
Start: 2018-05-30 | End: 2018-10-19

## 2018-05-30 NOTE — TELEPHONE ENCOUNTER
Was the patient seen in the last year in this department? Yes     Does patient have an active prescription for medications requested? No     Received Request Via: Pharmacy      Pt met protocol?: Yes, last ov 2/28/18

## 2018-07-10 ENCOUNTER — OFFICE VISIT (OUTPATIENT)
Dept: MEDICAL GROUP | Facility: PHYSICIAN GROUP | Age: 53
End: 2018-07-10
Payer: OTHER GOVERNMENT

## 2018-07-10 ENCOUNTER — HOSPITAL ENCOUNTER (OUTPATIENT)
Dept: LAB | Facility: MEDICAL CENTER | Age: 53
End: 2018-07-10
Attending: NURSE PRACTITIONER
Payer: OTHER GOVERNMENT

## 2018-07-10 VITALS
BODY MASS INDEX: 33.07 KG/M2 | RESPIRATION RATE: 16 BRPM | OXYGEN SATURATION: 97 % | TEMPERATURE: 97.4 F | DIASTOLIC BLOOD PRESSURE: 76 MMHG | HEIGHT: 75 IN | SYSTOLIC BLOOD PRESSURE: 118 MMHG | HEART RATE: 84 BPM | WEIGHT: 266 LBS

## 2018-07-10 DIAGNOSIS — K62.89 RECTAL LUMP: ICD-10-CM

## 2018-07-10 DIAGNOSIS — R79.89 ELEVATED LIVER FUNCTION TESTS: ICD-10-CM

## 2018-07-10 DIAGNOSIS — B35.1 TOENAIL FUNGUS: ICD-10-CM

## 2018-07-10 DIAGNOSIS — H61.23 CERUMEN DEBRIS ON TYMPANIC MEMBRANE OF BOTH EARS: ICD-10-CM

## 2018-07-10 DIAGNOSIS — R05.9 COUGH: ICD-10-CM

## 2018-07-10 LAB
ALBUMIN SERPL BCP-MCNC: 4.3 G/DL (ref 3.2–4.9)
ALP SERPL-CCNC: 130 U/L (ref 30–99)
ALT SERPL-CCNC: 26 U/L (ref 2–50)
ANION GAP SERPL CALC-SCNC: 9 MMOL/L (ref 0–11.9)
AST SERPL-CCNC: 27 U/L (ref 12–45)
BILIRUB CONJ SERPL-MCNC: <0.1 MG/DL (ref 0.1–0.5)
BILIRUB INDIRECT SERPL-MCNC: ABNORMAL MG/DL (ref 0–1)
BILIRUB SERPL-MCNC: 0.4 MG/DL (ref 0.1–1.5)
BUN SERPL-MCNC: 23 MG/DL (ref 8–22)
CALCIUM SERPL-MCNC: 9.3 MG/DL (ref 8.5–10.5)
CHLORIDE SERPL-SCNC: 102 MMOL/L (ref 96–112)
CO2 SERPL-SCNC: 26 MMOL/L (ref 20–33)
CREAT SERPL-MCNC: 0.94 MG/DL (ref 0.5–1.4)
GLUCOSE SERPL-MCNC: 87 MG/DL (ref 65–99)
POTASSIUM SERPL-SCNC: 4 MMOL/L (ref 3.6–5.5)
PROT SERPL-MCNC: 7.3 G/DL (ref 6–8.2)
SODIUM SERPL-SCNC: 137 MMOL/L (ref 135–145)

## 2018-07-10 PROCEDURE — 36415 COLL VENOUS BLD VENIPUNCTURE: CPT

## 2018-07-10 PROCEDURE — 80048 BASIC METABOLIC PNL TOTAL CA: CPT

## 2018-07-10 PROCEDURE — 99214 OFFICE O/P EST MOD 30 MIN: CPT | Performed by: NURSE PRACTITIONER

## 2018-07-10 PROCEDURE — 80076 HEPATIC FUNCTION PANEL: CPT

## 2018-07-10 NOTE — ASSESSMENT & PLAN NOTE
Patient noted small rectal lump about a couple weeks ago, initially it was soft, then changed to more of a firm lump and then it has completely resolved.  He does admit to excessive straining with bowel movements, he is also on his feet throughout the day, and patient is obese.  Discussed with him his symptoms sound very consistent with hemorrhoids.  He does deny rectal bleeding.  He does defer physical examination, but will come back again if the lump returns or if it worsens.  He was advised to stay well-hydrated, exercise regularly, eat adequate amounts of fiber or take a fiber supplement, he was advised to use over-the-counter stool softener such as MiraLAX to keep his stool soft to prevent straining.

## 2018-07-10 NOTE — PATIENT INSTRUCTIONS
Take in more fiber daily    Stay hydrated, keep stools soft and try to avoid prolonged sitting or standing, lose weight    Labs today to check liver, then will need regular labs in October    See me again in Dec/Jan

## 2018-07-10 NOTE — ASSESSMENT & PLAN NOTE
Patient reports to having increased cerumen and would like his ears checked.  Upon physical exam, both EACs are impacted with cerumen, attempted lavage, this was unsuccessful.  He was advised to apply Debrox drops to his ears for 2 days before he follows up and we will attempt to lavage this again.  He is to follow-up sooner if they become more bothersome or if any other symptoms develop.

## 2018-07-10 NOTE — ASSESSMENT & PLAN NOTE
It was noted with labs done in October he had mildly elevated alkaline phosphatase at 117.  He does admit to drinking alcohol on occasion and does not use an excessive amount of Tylenol.  He was to have a hepatic panel done before his visit today, but this was not done.  He will have that done today.  Patient will be notified of results and any further actions if needed.

## 2018-07-10 NOTE — PROGRESS NOTES
Chief Complaint   Patient presents with   • Cough   • Lump     on rectum x 1 month         This is a 52 y.o.male patient that presents today with the following: Follow-up visit, discuss acute conditions    Cough  Pt has had cough for 10 days, has also had ear discomfort. Cough was productive a couple of days ago. Denies fever, but has had nasal congestion. He does have allergies--has been taking claritin, but not working.   Advised to start Flonase 1 spray to each nostril twice a day and start Zyrtec. Can also nasal saline, 2 sprays to each nostril 3-5 times daily as needed for congestion and rinsing.     Toenail fungus  Patient was referred to podiatry back in February 2018 for toenail fungus.  He tells me today that he is going to be started on an oral medication, he believes is Lamisil.  He tells me that podiatrist sent over order for labs, but these are not scanned into his chart.  We will go ahead and order BMP and he is to have the hepatic panel that was ordered at his last visit.    Rectal lump  Patient noted small rectal lump about a couple weeks ago, initially it was soft, then changed to more of a firm lump and then it has completely resolved.  He does admit to excessive straining with bowel movements, he is also on his feet throughout the day, and patient is obese.  Discussed with him his symptoms sound very consistent with hemorrhoids.  He does deny rectal bleeding.  He does defer physical examination, but will come back again if the lump returns or if it worsens.  He was advised to stay well-hydrated, exercise regularly, eat adequate amounts of fiber or take a fiber supplement, he was advised to use over-the-counter stool softener such as MiraLAX to keep his stool soft to prevent straining.    Elevated liver function tests  It was noted with labs done in October he had mildly elevated alkaline phosphatase at 117.  He does admit to drinking alcohol on occasion and does not use an excessive amount of  Tylenol.  He was to have a hepatic panel done before his visit today, but this was not done.  He will have that done today.  Patient will be notified of results and any further actions if needed.    Cerumen debris on tympanic membrane of both ears  Patient reports to having increased cerumen and would like his ears checked.  Upon physical exam, both EACs are impacted with cerumen, attempted lavage, this was unsuccessful.  He was advised to apply Debrox drops to his ears for 2 days before he follows up and we will attempt to lavage this again.  He is to follow-up sooner if they become more bothersome or if any other symptoms develop.      No visits with results within 1 Month(s) from this visit.   Latest known visit with results is:   Hospital Outpatient Visit on 10/20/2017   Component Date Value   • Sodium 10/20/2017 138    • Potassium 10/20/2017 4.0    • Chloride 10/20/2017 104    • Co2 10/20/2017 25    • Anion Gap 10/20/2017 9.0    • Glucose 10/20/2017 87    • Bun 10/20/2017 19    • Creatinine 10/20/2017 0.77    • Calcium 10/20/2017 9.0    • AST(SGOT) 10/20/2017 24    • ALT(SGPT) 10/20/2017 21    • Alkaline Phosphatase 10/20/2017 117*   • Total Bilirubin 10/20/2017 0.3    • Albumin 10/20/2017 3.8    • Total Protein 10/20/2017 6.8    • Globulin 10/20/2017 3.0    • A-G Ratio 10/20/2017 1.3    • Cholesterol,Tot 10/20/2017 148    • Triglycerides 10/20/2017 95    • HDL 10/20/2017 32*   • LDL 10/20/2017 97    • Prostatic Specific Antig* 10/20/2017 0.40    • Testosterone,Total 10/20/2017 554    • Sex Hormone Bind Globulin 10/20/2017 45    • Free Testosterone 10/20/2017 89    • Testosterone % Free 10/20/2017 1.6    • GFR If  10/20/2017 >60    • GFR If Non  Ameri* 10/20/2017 >60          clinical course has been stable    Past Medical History:   Diagnosis Date   • Hypertension 10/14/2014       Past Surgical History:   Procedure Laterality Date   • DENTAL SURGERY         Family History   Problem  "Relation Age of Onset   • Heart Disease Father      dysrhythmia with pacer   • Cancer Father      skin cancer   • Cancer Maternal Grandfather      leukemia   • Alcohol/Drug Paternal Grandfather    • Diabetes Neg Hx    • Stroke Neg Hx        Patient has no known allergies.    Current Outpatient Prescriptions Ordered in [facility]   Medication Sig Dispense Refill   • triamcinolone acetonide (KENALOG) 0.1 % Cream APPLY TO THE AFFECTED AREA TWICE DAILY FOR NO MORE THAN 4 WEEKS AT A TIME 30 g 0   • lisinopril-hydrochlorothiazide (PRINZIDE, ZESTORETIC) 20-25 MG per tablet TAKE 1 TABLET BY MOUTH EVERY DAY 90 Tab 1   • Aug Betamethasone Dipropionate (DIPROLENE-AF) 0.05 % Cream Apply 0.05 g to affected area(s) 1 time daily as needed.     • Omega-3 Fatty Acids (FISH OIL) 1000 MG Cap capsule Take 1,000 mg by mouth 3 times a day, with meals.     • sildenafil citrate (VIAGRA) 50 MG tablet Take 1 Tab by mouth as needed for Erectile Dysfunction. 5 Tab 3   • fexofenadine (ALLEGRA) 60 MG Tab Take 60 mg by mouth every day.       No current [facility]-ordered facility-administered medications on file.        Constitutional ROS: No unexpected change in weight, No weakness, No unexplained fevers, sweats, or chills  Pulmonary ROS: Positive for cough   Cardiovascular ROS: No chest pain, No edema, No palpitations  Gastrointestinal ROS: No abdominal pain, No nausea, vomiting, diarrhea, or constipation, Positive for elevated liver enzymes  Musculoskeletal/Extremities ROS: No clubbing, No peripheral edema, No pain, redness or swelling on the joints  Neurologic ROS: Normal development, No seizures, No weakness    Physical exam:  /76   Pulse 84   Temp 36.3 °C (97.4 °F)   Resp 16   Ht 1.905 m (6' 3\")   Wt 120.7 kg (266 lb)   SpO2 97%   BMI 33.25 kg/m²   General Appearance: Middle-aged male, alert, no distress, obese, well-groomed  Skin: Skin color, texture, turgor normal. No rashes or lesions.  Ears: View of bilateral TMs obscured by " cerumen  Lungs: negative findings: normal respiratory rate and rhythm, lungs clear to auscultation  Heart: negative. RRR without murmur, gallop, or rubs.  No ectopy.  Abdomen: Abdomen soft, non-tender. BS normal. No masses,  No organomegaly  Musculoskeletal: negative findings: no evidence of joint instability, no evidence of muscle atrophy, no deformities present  Neurologic: intact, CN II through XII grossly intact  Rectal: Deferred per patient's request    Medical decision making/discussion: Patient was advised to stay well-hydrated, at least 48-64 ounces of water per day, eat adequate amounts of fiber or take fiber supplement.  He was also advised to avoid excessive or prolonged standing or sitting as well as to lose weight.  He is to have labs done as previously ordered, he will be notified of results and further actions if needed.  He will be due for his regular routine fasting labs in October.  I would like him to follow-up with me in December or January.    Dwight was seen today for cough and lump.    Diagnoses and all orders for this visit:    Cough    Rectal lump    Elevated liver function tests    Cerumen debris on tympanic membrane of both ears    Toenail fungus  -     BASIC METABOLIC PANEL; Future          Please note that this dictation was created using voice recognition software. I have made every reasonable attempt to correct obvious errors, but I expect that there are errors of grammar and possibly content that I did not discover before finalizing the note.

## 2018-07-10 NOTE — ASSESSMENT & PLAN NOTE
Patient was referred to podiatry back in February 2018 for toenail fungus.  He tells me today that he is going to be started on an oral medication, he believes is Lamisil.  He tells me that podiatrist sent over order for labs, but these are not scanned into his chart.  We will go ahead and order BMP and he is to have the hepatic panel that was ordered at his last visit.

## 2018-07-10 NOTE — ASSESSMENT & PLAN NOTE
Pt has had cough for 10 days, has also had ear discomfort. Cough was productive a couple of days ago. Denies fever, but has had nasal congestion. He does have allergies--has been taking claritin, but not working.   Advised to start Flonase 1 spray to each nostril twice a day and start Zyrtec. Can also nasal saline, 2 sprays to each nostril 3-5 times daily as needed for congestion and rinsing.

## 2018-07-16 DIAGNOSIS — R79.89 ELEVATED LIVER FUNCTION TESTS: ICD-10-CM

## 2018-10-19 ENCOUNTER — APPOINTMENT (OUTPATIENT)
Dept: RADIOLOGY | Facility: IMAGING CENTER | Age: 53
End: 2018-10-19
Attending: FAMILY MEDICINE
Payer: OTHER GOVERNMENT

## 2018-10-19 ENCOUNTER — OFFICE VISIT (OUTPATIENT)
Dept: URGENT CARE | Facility: PHYSICIAN GROUP | Age: 53
End: 2018-10-19
Payer: OTHER GOVERNMENT

## 2018-10-19 VITALS
OXYGEN SATURATION: 96 % | TEMPERATURE: 98.2 F | BODY MASS INDEX: 32.58 KG/M2 | SYSTOLIC BLOOD PRESSURE: 138 MMHG | WEIGHT: 262 LBS | RESPIRATION RATE: 18 BRPM | HEIGHT: 75 IN | DIASTOLIC BLOOD PRESSURE: 82 MMHG | HEART RATE: 101 BPM

## 2018-10-19 DIAGNOSIS — J98.8 RTI (RESPIRATORY TRACT INFECTION): ICD-10-CM

## 2018-10-19 PROCEDURE — 71046 X-RAY EXAM CHEST 2 VIEWS: CPT | Mod: 26 | Performed by: FAMILY MEDICINE

## 2018-10-19 PROCEDURE — 99214 OFFICE O/P EST MOD 30 MIN: CPT | Performed by: FAMILY MEDICINE

## 2018-10-19 RX ORDER — DOXYCYCLINE HYCLATE 100 MG
100 TABLET ORAL EVERY 12 HOURS
Qty: 14 TAB | Refills: 0 | Status: SHIPPED | OUTPATIENT
Start: 2018-10-19 | End: 2018-10-26

## 2018-10-19 RX ORDER — DEXTROMETHORPHAN HYDROBROMIDE AND PROMETHAZINE HYDROCHLORIDE 15; 6.25 MG/5ML; MG/5ML
5 SYRUP ORAL EVERY 6 HOURS PRN
Qty: 120 ML | Refills: 0 | Status: SHIPPED | OUTPATIENT
Start: 2018-10-19 | End: 2019-11-21

## 2018-10-19 RX ORDER — PREDNISONE 10 MG/1
30 TABLET ORAL EVERY MORNING
Qty: 21 TAB | Refills: 0 | Status: SHIPPED | OUTPATIENT
Start: 2018-10-19 | End: 2018-10-26

## 2018-10-19 ASSESSMENT — ENCOUNTER SYMPTOMS
SINUS PAIN: 1
SORE THROAT: 1
DIZZINESS: 0
CHILLS: 0
COUGH: 1
FEVER: 0

## 2018-10-19 NOTE — PROGRESS NOTES
"Subjective:      Dwight Linares is a 52 y.o. male who presents with Cough (x 2 months) and Nasal Congestion      - This is a very pleasant, well and non-toxic appearing 52 y.o. male with complaints of on/off cough and sinus congestion x 8 wks. No NVFC/cp/sob.           ALLERGIES:  Patient has no known allergies.     PMH:  Past Medical History:   Diagnosis Date   • Hypertension 10/14/2014        MEDS:    Current Outpatient Prescriptions:   •  doxycycline (VIBRAMYCIN) 100 MG Tab, Take 1 Tab by mouth every 12 hours for 7 days., Disp: 14 Tab, Rfl: 0  •  predniSONE (DELTASONE) 10 MG Tab, Take 3 Tabs by mouth every morning for 7 days., Disp: 21 Tab, Rfl: 0  •  promethazine-dextromethorphan (PROMETHAZINE-DM) 6.25-15 MG/5ML syrup, Take 5 mL by mouth every 6 hours as needed for Cough., Disp: 120 mL, Rfl: 0  •  lisinopril-hydrochlorothiazide (PRINZIDE, ZESTORETIC) 20-25 MG per tablet, TAKE 1 TABLET BY MOUTH EVERY DAY, Disp: 90 Tab, Rfl: 1  •  Omega-3 Fatty Acids (FISH OIL) 1000 MG Cap capsule, Take 1,000 mg by mouth 3 times a day, with meals., Disp: , Rfl:   •  fexofenadine (ALLEGRA) 60 MG Tab, Take 60 mg by mouth every day., Disp: , Rfl:   •  sildenafil citrate (VIAGRA) 50 MG tablet, Take 1 Tab by mouth as needed for Erectile Dysfunction., Disp: 5 Tab, Rfl: 3    ** I have documented what I find to be significant in regards to past medical, social, family and surgical history  in my HPI or under PMH/PSH/FH review section, otherwise it is contributory **           HPI    Review of Systems   Constitutional: Negative for chills and fever.   HENT: Positive for congestion, sinus pain and sore throat.    Respiratory: Positive for cough.    Neurological: Negative for dizziness.   All other systems reviewed and are negative.         Objective:     /82   Pulse (!) 101   Temp 36.8 °C (98.2 °F)   Resp 18   Ht 1.905 m (6' 3\")   Wt 118.8 kg (262 lb)   SpO2 96%   BMI 32.75 kg/m²      Physical Exam   Constitutional: He appears " well-developed. No distress.   HENT:   Head: Normocephalic and atraumatic.   Mouth/Throat: Oropharynx is clear and moist.   Eyes: Conjunctivae are normal.   Neck: Neck supple.   Cardiovascular: Regular rhythm.    No murmur heard.  Pulmonary/Chest: Effort normal and breath sounds normal. No respiratory distress.   Neurological: He is alert. He exhibits normal muscle tone.   Skin: Skin is warm and dry.   Psychiatric: He has a normal mood and affect. Judgment normal.   Nursing note and vitals reviewed.              Assessment/Plan:         1. RTI (respiratory tract infection)  DX-CHEST-2 VIEWS    doxycycline (VIBRAMYCIN) 100 MG Tab    predniSONE (DELTASONE) 10 MG Tab    promethazine-dextromethorphan (PROMETHAZINE-DM) 6.25-15 MG/5ML syrup       Xray: no acute findings by MY READ. RADIOLOGY READ PENDING.       Dx & d/c instructions discussed w/ patient and/or family members.     ER precautions (worsening signs symptoms and when to go to ER) discussed.    Follow up w/ PCP in 2-3 days to make sure symptoms improving and no further intervention/treatment and/or work-up needed was advised, ER if feeling worse or not improving in 2 days.    Possible side effects (i.e. Rash, GI upset/constipation, sedation, elevation of BP or sugars) of any medications given discussed.     Patient left in stable condition

## 2018-10-30 ENCOUNTER — HOSPITAL ENCOUNTER (OUTPATIENT)
Dept: RADIOLOGY | Facility: MEDICAL CENTER | Age: 53
End: 2018-10-30
Attending: NURSE PRACTITIONER
Payer: OTHER GOVERNMENT

## 2018-10-30 DIAGNOSIS — Z80.42 FAMILY HISTORY OF PROSTATE CANCER IN FATHER: ICD-10-CM

## 2018-10-30 DIAGNOSIS — R79.89 ELEVATED LIVER FUNCTION TESTS: ICD-10-CM

## 2018-10-30 DIAGNOSIS — E78.2 MIXED HYPERLIPIDEMIA: ICD-10-CM

## 2018-10-30 DIAGNOSIS — I10 ESSENTIAL HYPERTENSION: ICD-10-CM

## 2018-10-30 PROCEDURE — 76700 US EXAM ABDOM COMPLETE: CPT

## 2018-11-25 DIAGNOSIS — I10 ESSENTIAL HYPERTENSION: ICD-10-CM

## 2018-11-26 NOTE — TELEPHONE ENCOUNTER
Was the patient seen in the last year in this department? Yes    Does patient have an active prescription for medications requested? Yes    Received Request Via: Pharmacy    Last office visit: 07/10/2018

## 2018-11-28 RX ORDER — LISINOPRIL AND HYDROCHLOROTHIAZIDE 25; 20 MG/1; MG/1
TABLET ORAL
Qty: 90 TAB | Refills: 1 | Status: SHIPPED | OUTPATIENT
Start: 2018-11-28 | End: 2019-06-06 | Stop reason: SDUPTHER

## 2018-11-28 NOTE — TELEPHONE ENCOUNTER
Was the patient seen in the last year in this department? Yes    Does patient have an active prescription for medications requested? No     Received Request Via: Pharmacy      Pt met protocol?: Yes    OV 7/18     BP Readings from Last 1 Encounters:   10/19/18 138/82

## 2018-12-26 DIAGNOSIS — L30.9 ECZEMA, UNSPECIFIED TYPE: ICD-10-CM

## 2018-12-28 RX ORDER — TRIAMCINOLONE ACETONIDE 1 MG/G
CREAM TOPICAL
Qty: 30 G | Refills: 0 | Status: SHIPPED | OUTPATIENT
Start: 2018-12-28 | End: 2019-11-21

## 2019-01-04 ENCOUNTER — HOSPITAL ENCOUNTER (OUTPATIENT)
Dept: LAB | Facility: MEDICAL CENTER | Age: 54
End: 2019-01-04
Attending: NURSE PRACTITIONER
Payer: OTHER GOVERNMENT

## 2019-01-04 DIAGNOSIS — E78.2 MIXED HYPERLIPIDEMIA: ICD-10-CM

## 2019-01-04 DIAGNOSIS — I10 ESSENTIAL HYPERTENSION: ICD-10-CM

## 2019-01-04 DIAGNOSIS — Z80.42 FAMILY HISTORY OF PROSTATE CANCER IN FATHER: ICD-10-CM

## 2019-01-04 LAB
ALBUMIN SERPL BCP-MCNC: 4.6 G/DL (ref 3.2–4.9)
ALBUMIN/GLOB SERPL: 1.6 G/DL
ALP SERPL-CCNC: 122 U/L (ref 30–99)
ALT SERPL-CCNC: 24 U/L (ref 2–50)
ANION GAP SERPL CALC-SCNC: 9 MMOL/L (ref 0–11.9)
AST SERPL-CCNC: 25 U/L (ref 12–45)
BILIRUB SERPL-MCNC: 0.5 MG/DL (ref 0.1–1.5)
BUN SERPL-MCNC: 22 MG/DL (ref 8–22)
CALCIUM SERPL-MCNC: 9.2 MG/DL (ref 8.5–10.5)
CHLORIDE SERPL-SCNC: 102 MMOL/L (ref 96–112)
CHOLEST SERPL-MCNC: 177 MG/DL (ref 100–199)
CO2 SERPL-SCNC: 26 MMOL/L (ref 20–33)
CREAT SERPL-MCNC: 1.03 MG/DL (ref 0.5–1.4)
FASTING STATUS PATIENT QL REPORTED: NORMAL
GLOBULIN SER CALC-MCNC: 2.9 G/DL (ref 1.9–3.5)
GLUCOSE SERPL-MCNC: 102 MG/DL (ref 65–99)
HDLC SERPL-MCNC: 35 MG/DL
LDLC SERPL CALC-MCNC: 121 MG/DL
POTASSIUM SERPL-SCNC: 3.9 MMOL/L (ref 3.6–5.5)
PROT SERPL-MCNC: 7.5 G/DL (ref 6–8.2)
PSA SERPL-MCNC: 0.41 NG/ML (ref 0–4)
SODIUM SERPL-SCNC: 137 MMOL/L (ref 135–145)
TRIGL SERPL-MCNC: 104 MG/DL (ref 0–149)

## 2019-01-04 PROCEDURE — 80061 LIPID PANEL: CPT

## 2019-01-04 PROCEDURE — 84153 ASSAY OF PSA TOTAL: CPT

## 2019-01-04 PROCEDURE — 36415 COLL VENOUS BLD VENIPUNCTURE: CPT

## 2019-01-04 PROCEDURE — 80053 COMPREHEN METABOLIC PANEL: CPT

## 2019-01-10 ENCOUNTER — OFFICE VISIT (OUTPATIENT)
Dept: MEDICAL GROUP | Facility: PHYSICIAN GROUP | Age: 54
End: 2019-01-10
Payer: OTHER GOVERNMENT

## 2019-01-10 VITALS
RESPIRATION RATE: 16 BRPM | HEART RATE: 76 BPM | DIASTOLIC BLOOD PRESSURE: 76 MMHG | SYSTOLIC BLOOD PRESSURE: 122 MMHG | WEIGHT: 269 LBS | BODY MASS INDEX: 33.45 KG/M2 | TEMPERATURE: 98.5 F | OXYGEN SATURATION: 98 % | HEIGHT: 75 IN

## 2019-01-10 DIAGNOSIS — E66.9 OBESITY (BMI 30-39.9): ICD-10-CM

## 2019-01-10 DIAGNOSIS — Z80.42 FAMILY HISTORY OF PROSTATE CANCER IN FATHER: ICD-10-CM

## 2019-01-10 DIAGNOSIS — I10 ESSENTIAL HYPERTENSION: ICD-10-CM

## 2019-01-10 DIAGNOSIS — R79.89 ELEVATED LIVER FUNCTION TESTS: ICD-10-CM

## 2019-01-10 DIAGNOSIS — E78.5 HYPERLIPIDEMIA, UNSPECIFIED HYPERLIPIDEMIA TYPE: ICD-10-CM

## 2019-01-10 DIAGNOSIS — B35.1 TOENAIL FUNGUS: ICD-10-CM

## 2019-01-10 PROCEDURE — 99214 OFFICE O/P EST MOD 30 MIN: CPT | Performed by: NURSE PRACTITIONER

## 2019-01-10 ASSESSMENT — PATIENT HEALTH QUESTIONNAIRE - PHQ9: CLINICAL INTERPRETATION OF PHQ2 SCORE: 0

## 2019-01-10 NOTE — ASSESSMENT & PLAN NOTE
The 10-year ASCVD risk score (Malachi CARLISLE Jr., et al., 2013) is: 6.6%    Values used to calculate the score:      Age: 53 years      Sex: Male      Is Non- : No      Diabetic: No      Tobacco smoker: No      Systolic Blood Pressure: 122 mmHg      Is BP treated: Yes      HDL Cholesterol: 35 mg/dL      Total Cholesterol: 177 mg/dL  Not currently on statin, he was given printed educational material on preventing high cholesterol

## 2019-01-10 NOTE — PROGRESS NOTES
Chief Complaint   Patient presents with   • Hypertension     fv labs         This is a 53 y.o.male patient that presents today with the following: Follow-up visit, review labs    Hyperlipidemia  The 10-year ASCVD risk score (Malachi CARLISLE Jr., et al., 2013) is: 6.6%    Values used to calculate the score:      Age: 53 years      Sex: Male      Is Non- : No      Diabetic: No      Tobacco smoker: No      Systolic Blood Pressure: 122 mmHg      Is BP treated: Yes      HDL Cholesterol: 35 mg/dL      Total Cholesterol: 177 mg/dL  Not currently on statin, he was given printed educational material on preventing high cholesterol    Elevated liver function tests  This is chronic and stable, likely due to hepatic steatosis. Alk Phos only level elevated. Will continue to monitor.     Family history of prostate cancer in father  Pt has annual PSA measurement due to family history of prostate ca in first degree relative--father. Most recent PSA normal and he denies s/sx of prostate cancer. Will recheck in 1 year    Hypertension  This is chronic, stable and well controlled with current medication. BP today is 122/76 and he denies s/sx of HTN. He is up to date with labs.    Obesity (BMI 30-39.9)  Pt continues to work on this, weight is the same as it was in October 2018.discussed weight loss strategies.    Toenail fungus  Pt wishes to start terbinafine for toenail fungus as prescribed by podiatry, he has mildly elevated Alk phos. Will repeat test 6 weeks into treatment.      Hospital Outpatient Visit on 01/04/2019   Component Date Value   • Sodium 01/04/2019 137    • Potassium 01/04/2019 3.9    • Chloride 01/04/2019 102    • Co2 01/04/2019 26    • Anion Gap 01/04/2019 9.0    • Glucose 01/04/2019 102*   • Bun 01/04/2019 22    • Creatinine 01/04/2019 1.03    • Calcium 01/04/2019 9.2    • AST(SGOT) 01/04/2019 25    • ALT(SGPT) 01/04/2019 24    • Alkaline Phosphatase 01/04/2019 122*   • Total Bilirubin 01/04/2019 0.5     • Albumin 01/04/2019 4.6    • Total Protein 01/04/2019 7.5    • Globulin 01/04/2019 2.9    • A-G Ratio 01/04/2019 1.6    • Cholesterol,Tot 01/04/2019 177    • Triglycerides 01/04/2019 104    • HDL 01/04/2019 35*   • LDL 01/04/2019 121*   • Prostatic Specific Antig* 01/04/2019 0.41    • Fasting Status 01/04/2019 Fasting    • GFR If  01/04/2019 >60    • GFR If Non  Ameri* 01/04/2019 >60          clinical course has been stable    Past Medical History:   Diagnosis Date   • Hypertension 10/14/2014       Past Surgical History:   Procedure Laterality Date   • DENTAL SURGERY         Family History   Problem Relation Age of Onset   • Heart Disease Father         dysrhythmia with pacer   • Cancer Father         skin cancer   • Cancer Maternal Grandfather         leukemia   • Alcohol/Drug Paternal Grandfather    • Diabetes Neg Hx    • Stroke Neg Hx        Patient has no known allergies.    Current Outpatient Prescriptions Ordered in [facility]   Medication Sig Dispense Refill   • lisinopril-hydrochlorothiazide (PRINZIDE, ZESTORETIC) 20-25 MG per tablet TAKE 1 TABLET BY MOUTH EVERY DAY 90 Tab 1   • Omega-3 Fatty Acids (FISH OIL) 1000 MG Cap capsule Take 1,000 mg by mouth 3 times a day, with meals.     • triamcinolone acetonide (KENALOG) 0.1 % Cream APPLY TO THE AFFECTED AREA TWICE DAILY FOR NO MORE THAN 4 WEEKS AT A TIME 30 g 0   • promethazine-dextromethorphan (PROMETHAZINE-DM) 6.25-15 MG/5ML syrup Take 5 mL by mouth every 6 hours as needed for Cough. 120 mL 0   • fexofenadine (ALLEGRA) 60 MG Tab Take 60 mg by mouth every day.     • sildenafil citrate (VIAGRA) 50 MG tablet Take 1 Tab by mouth as needed for Erectile Dysfunction. 5 Tab 3     No current Epic-ordered facility-administered medications on file.        Constitutional ROS: No unexpected change in weight, No weakness, No unexplained fevers, sweats, or chills  Pulmonary ROS: No chronic cough, sputum, or hemoptysis, No shortness of breath, No recent  "change in breathing  Cardiovascular ROS: No chest pain, No edema, No palpitations, Positive for hypertension and hyperlipidemia  Gastrointestinal ROS: No abdominal pain, No nausea, vomiting, diarrhea, or constipation, Positive for elevated liver function tests  Musculoskeletal/Extremities ROS: No clubbing, No peripheral edema, No pain, redness or swelling on the joints  Neurologic ROS: Normal development, No seizures, No weakness    Physical exam:  /76 (BP Location: Right arm, Patient Position: Sitting, BP Cuff Size: Adult long)   Pulse 76   Temp 36.9 °C (98.5 °F) (Temporal)   Resp 16   Ht 1.905 m (6' 3\")   Wt 122 kg (269 lb)   SpO2 98%   BMI 33.62 kg/m²   General Appearance: Middle-aged male, alert, no distress, obese, well-groomed  Skin: Skin color, texture, turgor normal. No rashes or lesions.  Lungs: negative findings: normal respiratory rate and rhythm, normal effort  Abdomen: Abdomen soft, non-tender. BS normal. No masses,  No organomegaly  Musculoskeletal: negative findings: no evidence of joint instability, no evidence of muscle atrophy, no deformities present  Neurologic: intact    Medical decision making/discussion: Patient to follow-up with me in 6 months with labs done before visit.  He was also advised to have labs done 6 weeks after starting Lamisil for toenail fungus.  He is to continue working on healthy diet, regular exercise and continued efforts towards weight loss.    Dwight was seen today for hypertension.    Diagnoses and all orders for this visit:    Family history of prostate cancer in father    Essential hypertension    Hyperlipidemia, unspecified hyperlipidemia type    Elevated liver function tests  -     HEPATIC FUNCTION PANEL; Future    Obesity (BMI 30-39.9)    Toenail fungus  -     CBC WITH DIFFERENTIAL; Future          Please note that this dictation was created using voice recognition software. I have made every reasonable attempt to correct obvious errors, but I expect " that there are errors of grammar and possibly content that I did not discover before finalizing the note.

## 2019-01-12 NOTE — ASSESSMENT & PLAN NOTE
This is chronic, stable and well controlled with current medication. BP today is 122/76 and he denies s/sx of HTN. He is up to date with labs.

## 2019-01-12 NOTE — ASSESSMENT & PLAN NOTE
Pt has annual PSA measurement due to family history of prostate ca in first degree relative--father. Most recent PSA normal and he denies s/sx of prostate cancer. Will recheck in 1 year

## 2019-01-12 NOTE — ASSESSMENT & PLAN NOTE
Pt wishes to start terbinafine for toenail fungus as prescribed by podiatry, he has mildly elevated Alk phos. Will repeat test 6 weeks into treatment.

## 2019-01-12 NOTE — ASSESSMENT & PLAN NOTE
This is chronic and stable, likely due to hepatic steatosis. Alk Phos only level elevated. Will continue to monitor.

## 2019-01-12 NOTE — ASSESSMENT & PLAN NOTE
Pt continues to work on this, weight is the same as it was in October 2018.discussed weight loss strategies.

## 2019-02-16 ENCOUNTER — OFFICE VISIT (OUTPATIENT)
Dept: URGENT CARE | Facility: PHYSICIAN GROUP | Age: 54
End: 2019-02-16
Payer: OTHER GOVERNMENT

## 2019-02-16 VITALS
WEIGHT: 270 LBS | RESPIRATION RATE: 16 BRPM | HEART RATE: 91 BPM | SYSTOLIC BLOOD PRESSURE: 126 MMHG | BODY MASS INDEX: 33.75 KG/M2 | OXYGEN SATURATION: 96 % | TEMPERATURE: 97.7 F | DIASTOLIC BLOOD PRESSURE: 78 MMHG

## 2019-02-16 DIAGNOSIS — J06.9 VIRAL URI WITH COUGH: ICD-10-CM

## 2019-02-16 PROCEDURE — 99214 OFFICE O/P EST MOD 30 MIN: CPT | Performed by: NURSE PRACTITIONER

## 2019-02-16 ASSESSMENT — ENCOUNTER SYMPTOMS
EYE DISCHARGE: 0
SHORTNESS OF BREATH: 0
ORTHOPNEA: 0
DIARRHEA: 0
MYALGIAS: 0
NAUSEA: 0
COUGH: 1
SORE THROAT: 0
WHEEZING: 1
FEVER: 0
SPUTUM PRODUCTION: 0
HEADACHES: 0
CHILLS: 0

## 2019-02-16 NOTE — PROGRESS NOTES
Subjective:      Dwight Linares is a 53 y.o. male who presents with Wheezing (x 3 days ) and Cough            HPI new problem. 53 year old male with cough and congestion for 3 days. Denies fever and chills, myalgia, headache or vomiting and diarrhea. He has no sore throat, shortness of breath. Feels wheezing at nighttime.   Patient has no known allergies.  Current Outpatient Prescriptions on File Prior to Visit   Medication Sig Dispense Refill   • triamcinolone acetonide (KENALOG) 0.1 % Cream APPLY TO THE AFFECTED AREA TWICE DAILY FOR NO MORE THAN 4 WEEKS AT A TIME 30 g 0   • lisinopril-hydrochlorothiazide (PRINZIDE, ZESTORETIC) 20-25 MG per tablet TAKE 1 TABLET BY MOUTH EVERY DAY 90 Tab 1   • Omega-3 Fatty Acids (FISH OIL) 1000 MG Cap capsule Take 1,000 mg by mouth 3 times a day, with meals.     • promethazine-dextromethorphan (PROMETHAZINE-DM) 6.25-15 MG/5ML syrup Take 5 mL by mouth every 6 hours as needed for Cough. (Patient not taking: Reported on 2/16/2019) 120 mL 0   • fexofenadine (ALLEGRA) 60 MG Tab Take 60 mg by mouth every day.     • sildenafil citrate (VIAGRA) 50 MG tablet Take 1 Tab by mouth as needed for Erectile Dysfunction. (Patient not taking: Reported on 2/16/2019) 5 Tab 3     No current facility-administered medications on file prior to visit.      Social History     Social History   • Marital status: Single     Spouse name: N/A   • Number of children: N/A   • Years of education: N/A     Occupational History   • Not on file.     Social History Main Topics   • Smoking status: Former Smoker     Types: Cigars   • Smokeless tobacco: Former User      Comment: once a month cigar   • Alcohol use Yes      Comment: occ   • Drug use: No   • Sexual activity: Not on file      Comment: in relationship, .      Other Topics Concern   • Not on file     Social History Narrative   • No narrative on file     family history includes Alcohol/Drug in his paternal grandfather; Cancer in his father and  maternal grandfather; Heart Disease in his father.      Review of Systems   Constitutional: Negative for chills, fever and malaise/fatigue.   HENT: Positive for congestion. Negative for sore throat.    Eyes: Negative for discharge.   Respiratory: Positive for cough and wheezing. Negative for sputum production and shortness of breath.    Cardiovascular: Negative for chest pain and orthopnea.   Gastrointestinal: Negative for diarrhea and nausea.   Musculoskeletal: Negative for myalgias.   Neurological: Negative for headaches.   Endo/Heme/Allergies: Negative for environmental allergies.          Objective:     /78   Pulse 91   Temp 36.5 °C (97.7 °F) (Temporal)   Resp 16   Wt 122.5 kg (270 lb)   SpO2 96%   BMI 33.75 kg/m²      Physical Exam   Constitutional: He is oriented to person, place, and time. He appears well-developed and well-nourished. No distress.   HENT:   Head: Normocephalic and atraumatic.   Right Ear: External ear and ear canal normal. Tympanic membrane is not injected and not perforated. No middle ear effusion.   Left Ear: External ear and ear canal normal. Tympanic membrane is not injected and not perforated.  No middle ear effusion.   Nose: Mucosal edema present.   Mouth/Throat: Posterior oropharyngeal erythema present. No oropharyngeal exudate.   Eyes: Conjunctivae are normal. Right eye exhibits no discharge. Left eye exhibits no discharge.   Neck: Normal range of motion. Neck supple.   Cardiovascular: Normal rate, regular rhythm and normal heart sounds.    No murmur heard.  Pulmonary/Chest: Effort normal and breath sounds normal. No respiratory distress.   Musculoskeletal: Normal range of motion.   Normal movement of all 4 extremities.   Lymphadenopathy:     He has no cervical adenopathy.        Right: No supraclavicular adenopathy present.        Left: No supraclavicular adenopathy present.   Neurological: He is alert and oriented to person, place, and time. Gait normal.   Skin: Skin is  warm and dry.   Psychiatric: He has a normal mood and affect. His behavior is normal. Thought content normal.   Nursing note and vitals reviewed.              Assessment/Plan:     1. Viral URI with cough  Hydrocod Polst-CPM Polst ER (TUSSIONEX) 10-8 MG/5ML Suspension Extended Release     Patient is cautioned on sedation potential of narcotic medication; no drinking, driving or operating heavy machinery while on this medication.  Viral illness at this time with no indication for antibiotics. Reviewed with patient expected course of illness and also reviewed OTC medications that may be used for symptom relief. Follow up 7-10 days if not improving.  Differential diagnosis, natural history, supportive care, and indications for immediate follow-up discussed at length.

## 2019-03-13 ENCOUNTER — PATIENT MESSAGE (OUTPATIENT)
Dept: MEDICAL GROUP | Facility: PHYSICIAN GROUP | Age: 54
End: 2019-03-13

## 2019-03-13 DIAGNOSIS — L30.9 ECZEMA, UNSPECIFIED TYPE: ICD-10-CM

## 2019-06-06 DIAGNOSIS — I10 ESSENTIAL HYPERTENSION: ICD-10-CM

## 2019-06-06 NOTE — TELEPHONE ENCOUNTER
Was the patient seen in the last year in this department? Yes    Does patient have an active prescription for medications requested? No     Received Request Via: Pharmacy    Pt met protocol?: Yes     Last OV 01/10/2019    BP Readings from Last 1 Encounters:   02/16/19 126/78

## 2019-06-07 RX ORDER — LISINOPRIL AND HYDROCHLOROTHIAZIDE 25; 20 MG/1; MG/1
TABLET ORAL
Qty: 90 TAB | Refills: 0 | Status: SHIPPED | OUTPATIENT
Start: 2019-06-07 | End: 2019-09-10 | Stop reason: SDUPTHER

## 2019-08-29 ENCOUNTER — HOSPITAL ENCOUNTER (OUTPATIENT)
Dept: LAB | Facility: MEDICAL CENTER | Age: 54
End: 2019-08-29
Attending: NURSE PRACTITIONER
Payer: OTHER GOVERNMENT

## 2019-08-29 DIAGNOSIS — B35.1 TOENAIL FUNGUS: ICD-10-CM

## 2019-08-29 DIAGNOSIS — R79.89 ELEVATED LIVER FUNCTION TESTS: ICD-10-CM

## 2019-08-29 LAB
ALBUMIN SERPL BCP-MCNC: 4.1 G/DL (ref 3.2–4.9)
ALP SERPL-CCNC: 104 U/L (ref 30–99)
ALT SERPL-CCNC: 22 U/L (ref 2–50)
AST SERPL-CCNC: 26 U/L (ref 12–45)
BASOPHILS # BLD AUTO: 0.2 % (ref 0–1.8)
BASOPHILS # BLD: 0.01 K/UL (ref 0–0.12)
BILIRUB CONJ SERPL-MCNC: 0.1 MG/DL (ref 0.1–0.5)
BILIRUB INDIRECT SERPL-MCNC: 0.3 MG/DL (ref 0–1)
BILIRUB SERPL-MCNC: 0.4 MG/DL (ref 0.1–1.5)
EOSINOPHIL # BLD AUTO: 0.04 K/UL (ref 0–0.51)
EOSINOPHIL NFR BLD: 0.9 % (ref 0–6.9)
ERYTHROCYTE [DISTWIDTH] IN BLOOD BY AUTOMATED COUNT: 44.1 FL (ref 35.9–50)
HCT VFR BLD AUTO: 46.5 % (ref 42–52)
HGB BLD-MCNC: 16 G/DL (ref 14–18)
IMM GRANULOCYTES # BLD AUTO: 0 K/UL (ref 0–0.11)
IMM GRANULOCYTES NFR BLD AUTO: 0 % (ref 0–0.9)
LYMPHOCYTES # BLD AUTO: 1.4 K/UL (ref 1–4.8)
LYMPHOCYTES NFR BLD: 31.9 % (ref 22–41)
MCH RBC QN AUTO: 32.2 PG (ref 27–33)
MCHC RBC AUTO-ENTMCNC: 34.4 G/DL (ref 33.7–35.3)
MCV RBC AUTO: 93.6 FL (ref 81.4–97.8)
MONOCYTES # BLD AUTO: 0.45 K/UL (ref 0–0.85)
MONOCYTES NFR BLD AUTO: 10.3 % (ref 0–13.4)
NEUTROPHILS # BLD AUTO: 2.49 K/UL (ref 1.82–7.42)
NEUTROPHILS NFR BLD: 56.7 % (ref 44–72)
NRBC # BLD AUTO: 0 K/UL
NRBC BLD-RTO: 0 /100 WBC
PLATELET # BLD AUTO: 240 K/UL (ref 164–446)
PMV BLD AUTO: 10.5 FL (ref 9–12.9)
PROT SERPL-MCNC: 7 G/DL (ref 6–8.2)
RBC # BLD AUTO: 4.97 M/UL (ref 4.7–6.1)
WBC # BLD AUTO: 4.4 K/UL (ref 4.8–10.8)

## 2019-08-29 PROCEDURE — 85025 COMPLETE CBC W/AUTO DIFF WBC: CPT

## 2019-08-29 PROCEDURE — 80076 HEPATIC FUNCTION PANEL: CPT

## 2019-08-29 PROCEDURE — 36415 COLL VENOUS BLD VENIPUNCTURE: CPT

## 2019-09-10 DIAGNOSIS — I10 ESSENTIAL HYPERTENSION: ICD-10-CM

## 2019-09-11 NOTE — TELEPHONE ENCOUNTER
Was the patient seen in the last year in this department? Yes    Does patient have an active prescription for medications requested? No     Received Request Via: Pharmacy    Pt met protocol?: No     Last OV 01/10/19    BP Readings from Last 1 Encounters:   02/16/19 126/78

## 2019-09-12 RX ORDER — LISINOPRIL AND HYDROCHLOROTHIAZIDE 25; 20 MG/1; MG/1
TABLET ORAL
Qty: 90 TAB | Refills: 0 | Status: SHIPPED | OUTPATIENT
Start: 2019-09-12 | End: 2019-12-17

## 2019-10-07 DIAGNOSIS — B35.1 TOENAIL FUNGUS: ICD-10-CM

## 2019-10-07 DIAGNOSIS — F41.9 ANXIETY: ICD-10-CM

## 2019-11-21 ENCOUNTER — OFFICE VISIT (OUTPATIENT)
Dept: MEDICAL GROUP | Facility: PHYSICIAN GROUP | Age: 54
End: 2019-11-21
Payer: OTHER GOVERNMENT

## 2019-11-21 VITALS
TEMPERATURE: 97.6 F | OXYGEN SATURATION: 94 % | HEIGHT: 75 IN | HEART RATE: 106 BPM | BODY MASS INDEX: 33.45 KG/M2 | RESPIRATION RATE: 16 BRPM | SYSTOLIC BLOOD PRESSURE: 146 MMHG | WEIGHT: 269 LBS | DIASTOLIC BLOOD PRESSURE: 100 MMHG

## 2019-11-21 DIAGNOSIS — R73.01 ELEVATED FASTING GLUCOSE: ICD-10-CM

## 2019-11-21 DIAGNOSIS — Z23 NEED FOR VACCINATION: ICD-10-CM

## 2019-11-21 DIAGNOSIS — E78.5 HYPERLIPIDEMIA, UNSPECIFIED HYPERLIPIDEMIA TYPE: ICD-10-CM

## 2019-11-21 DIAGNOSIS — F41.9 ANXIETY: ICD-10-CM

## 2019-11-21 DIAGNOSIS — Z80.42 FAMILY HISTORY OF PROSTATE CANCER IN FATHER: ICD-10-CM

## 2019-11-21 DIAGNOSIS — I10 ESSENTIAL HYPERTENSION: ICD-10-CM

## 2019-11-21 DIAGNOSIS — F33.1 MODERATE EPISODE OF RECURRENT MAJOR DEPRESSIVE DISORDER (HCC): ICD-10-CM

## 2019-11-21 DIAGNOSIS — Z23 NEED FOR TDAP VACCINATION: ICD-10-CM

## 2019-11-21 PROBLEM — F32.9 MAJOR DEPRESSIVE DISORDER: Status: ACTIVE | Noted: 2019-11-21

## 2019-11-21 PROCEDURE — 90472 IMMUNIZATION ADMIN EACH ADD: CPT | Performed by: NURSE PRACTITIONER

## 2019-11-21 PROCEDURE — 90471 IMMUNIZATION ADMIN: CPT | Performed by: NURSE PRACTITIONER

## 2019-11-21 PROCEDURE — 99214 OFFICE O/P EST MOD 30 MIN: CPT | Mod: 25 | Performed by: NURSE PRACTITIONER

## 2019-11-21 PROCEDURE — 90686 IIV4 VACC NO PRSV 0.5 ML IM: CPT | Performed by: NURSE PRACTITIONER

## 2019-11-21 PROCEDURE — 90715 TDAP VACCINE 7 YRS/> IM: CPT | Performed by: NURSE PRACTITIONER

## 2019-11-21 RX ORDER — BETAMETHASONE DIPROPIONATE 0.5 MG/G
CREAM TOPICAL
COMMUNITY
Start: 2019-10-30 | End: 2019-11-21

## 2019-11-21 RX ORDER — PAROXETINE HYDROCHLORIDE 20 MG/1
20 TABLET, FILM COATED ORAL DAILY
Qty: 90 TAB | Refills: 0 | Status: SHIPPED | OUTPATIENT
Start: 2019-11-21 | End: 2020-02-06 | Stop reason: SDUPTHER

## 2019-11-21 RX ORDER — CEPHALEXIN 250 MG/1
CAPSULE ORAL
COMMUNITY
Start: 2019-10-31 | End: 2019-11-21

## 2019-11-21 ASSESSMENT — PATIENT HEALTH QUESTIONNAIRE - PHQ9
CLINICAL INTERPRETATION OF PHQ2 SCORE: 4
SUM OF ALL RESPONSES TO PHQ QUESTIONS 1-9: 10
5. POOR APPETITE OR OVEREATING: 1 - SEVERAL DAYS
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
## 2019-11-22 ENCOUNTER — HOSPITAL ENCOUNTER (OUTPATIENT)
Dept: LAB | Facility: MEDICAL CENTER | Age: 54
End: 2019-11-22
Attending: NURSE PRACTITIONER
Payer: OTHER GOVERNMENT

## 2019-11-22 DIAGNOSIS — F41.9 ANXIETY: ICD-10-CM

## 2019-11-22 DIAGNOSIS — Z80.42 FAMILY HISTORY OF PROSTATE CANCER IN FATHER: ICD-10-CM

## 2019-11-22 DIAGNOSIS — E78.5 HYPERLIPIDEMIA, UNSPECIFIED HYPERLIPIDEMIA TYPE: ICD-10-CM

## 2019-11-22 DIAGNOSIS — I10 ESSENTIAL HYPERTENSION: ICD-10-CM

## 2019-11-22 DIAGNOSIS — R73.01 ELEVATED FASTING GLUCOSE: ICD-10-CM

## 2019-11-22 DIAGNOSIS — F33.1 MODERATE EPISODE OF RECURRENT MAJOR DEPRESSIVE DISORDER (HCC): ICD-10-CM

## 2019-11-22 LAB
25(OH)D3 SERPL-MCNC: 14 NG/ML (ref 30–100)
ALBUMIN SERPL BCP-MCNC: 4.5 G/DL (ref 3.2–4.9)
ALBUMIN/GLOB SERPL: 1.7 G/DL
ALP SERPL-CCNC: 122 U/L (ref 30–99)
ALT SERPL-CCNC: 25 U/L (ref 2–50)
ANION GAP SERPL CALC-SCNC: 9 MMOL/L (ref 0–11.9)
AST SERPL-CCNC: 24 U/L (ref 12–45)
BASOPHILS # BLD AUTO: 0.4 % (ref 0–1.8)
BASOPHILS # BLD: 0.02 K/UL (ref 0–0.12)
BILIRUB SERPL-MCNC: 0.5 MG/DL (ref 0.1–1.5)
BUN SERPL-MCNC: 22 MG/DL (ref 8–22)
CALCIUM SERPL-MCNC: 8.9 MG/DL (ref 8.5–10.5)
CHLORIDE SERPL-SCNC: 105 MMOL/L (ref 96–112)
CHOLEST SERPL-MCNC: 189 MG/DL (ref 100–199)
CO2 SERPL-SCNC: 25 MMOL/L (ref 20–33)
CREAT SERPL-MCNC: 0.94 MG/DL (ref 0.5–1.4)
EOSINOPHIL # BLD AUTO: 0.05 K/UL (ref 0–0.51)
EOSINOPHIL NFR BLD: 0.9 % (ref 0–6.9)
ERYTHROCYTE [DISTWIDTH] IN BLOOD BY AUTOMATED COUNT: 44.6 FL (ref 35.9–50)
FASTING STATUS PATIENT QL REPORTED: NORMAL
GLOBULIN SER CALC-MCNC: 2.6 G/DL (ref 1.9–3.5)
GLUCOSE SERPL-MCNC: 87 MG/DL (ref 65–99)
HCT VFR BLD AUTO: 48.7 % (ref 42–52)
HDLC SERPL-MCNC: 36 MG/DL
HGB BLD-MCNC: 16.5 G/DL (ref 14–18)
IMM GRANULOCYTES # BLD AUTO: 0.01 K/UL (ref 0–0.11)
IMM GRANULOCYTES NFR BLD AUTO: 0.2 % (ref 0–0.9)
LDLC SERPL CALC-MCNC: 132 MG/DL
LYMPHOCYTES # BLD AUTO: 1.28 K/UL (ref 1–4.8)
LYMPHOCYTES NFR BLD: 23 % (ref 22–41)
MCH RBC QN AUTO: 31.3 PG (ref 27–33)
MCHC RBC AUTO-ENTMCNC: 33.9 G/DL (ref 33.7–35.3)
MCV RBC AUTO: 92.4 FL (ref 81.4–97.8)
MONOCYTES # BLD AUTO: 0.52 K/UL (ref 0–0.85)
MONOCYTES NFR BLD AUTO: 9.3 % (ref 0–13.4)
NEUTROPHILS # BLD AUTO: 3.69 K/UL (ref 1.82–7.42)
NEUTROPHILS NFR BLD: 66.2 % (ref 44–72)
NRBC # BLD AUTO: 0 K/UL
NRBC BLD-RTO: 0 /100 WBC
PLATELET # BLD AUTO: 253 K/UL (ref 164–446)
PMV BLD AUTO: 10.4 FL (ref 9–12.9)
POTASSIUM SERPL-SCNC: 3.9 MMOL/L (ref 3.6–5.5)
PROT SERPL-MCNC: 7.1 G/DL (ref 6–8.2)
PSA SERPL-MCNC: 0.3 NG/ML (ref 0–4)
RBC # BLD AUTO: 5.27 M/UL (ref 4.7–6.1)
SODIUM SERPL-SCNC: 139 MMOL/L (ref 135–145)
TRIGL SERPL-MCNC: 104 MG/DL (ref 0–149)
TSH SERPL DL<=0.005 MIU/L-ACNC: 1.18 UIU/ML (ref 0.38–5.33)
WBC # BLD AUTO: 5.6 K/UL (ref 4.8–10.8)

## 2019-11-22 PROCEDURE — 36415 COLL VENOUS BLD VENIPUNCTURE: CPT

## 2019-11-22 PROCEDURE — 84443 ASSAY THYROID STIM HORMONE: CPT

## 2019-11-22 PROCEDURE — 85025 COMPLETE CBC W/AUTO DIFF WBC: CPT

## 2019-11-22 PROCEDURE — 80053 COMPREHEN METABOLIC PANEL: CPT

## 2019-11-22 PROCEDURE — 84153 ASSAY OF PSA TOTAL: CPT

## 2019-11-22 PROCEDURE — 83036 HEMOGLOBIN GLYCOSYLATED A1C: CPT

## 2019-11-22 PROCEDURE — 80061 LIPID PANEL: CPT

## 2019-11-22 PROCEDURE — 82306 VITAMIN D 25 HYDROXY: CPT

## 2019-11-23 LAB
EST. AVERAGE GLUCOSE BLD GHB EST-MCNC: 114 MG/DL
HBA1C MFR BLD: 5.6 % (ref 0–5.6)

## 2019-11-25 ENCOUNTER — TELEPHONE (OUTPATIENT)
Dept: MEDICAL GROUP | Facility: PHYSICIAN GROUP | Age: 54
End: 2019-11-25

## 2019-11-25 NOTE — ASSESSMENT & PLAN NOTE
The 10-year ASCVD risk score (Malachi CARLISLE Jr., et al., 2013) is: 10.2%  Patient is not on statin at this time, however he would like to discuss this at his follow-up appointment when he comes in 6 to 8 weeks.  Discussed the importance of lifestyle modifications.
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
## 2019-11-25 NOTE — ASSESSMENT & PLAN NOTE
This is a chronic condition, stable and usually well controlled on his medications however blood pressure today is mildly elevated as he is quite stressed and feeling anxious, due for labs, these have been ordered.

## 2019-11-25 NOTE — ASSESSMENT & PLAN NOTE
Patient does have family history of prostate cancer specifically in his father and has annual PSAs, this is been ordered.  He denies lower urinary tract symptoms.

## 2019-11-26 NOTE — TELEPHONE ENCOUNTER
----- Message from MENDEZ Baca sent at 11/25/2019 10:53 AM PST -----  Please let pt know that overall labs are good, they are stable except for vitamin D, would recommend he take at least 5000 IU daily

## 2019-12-16 DIAGNOSIS — I10 ESSENTIAL HYPERTENSION: ICD-10-CM

## 2019-12-17 RX ORDER — LISINOPRIL AND HYDROCHLOROTHIAZIDE 25; 20 MG/1; MG/1
TABLET ORAL
Qty: 90 TAB | Refills: 1 | Status: SHIPPED | OUTPATIENT
Start: 2019-12-17 | End: 2020-06-29 | Stop reason: SDUPTHER

## 2019-12-17 NOTE — TELEPHONE ENCOUNTER
Refill X 6 months, sent to pharmacy.Pt. Seen in the last 6 months per protocol.   Lab Results   Component Value Date/Time    SODIUM 139 11/22/2019 12:16 PM    POTASSIUM 3.9 11/22/2019 12:16 PM    CHLORIDE 105 11/22/2019 12:16 PM    CO2 25 11/22/2019 12:16 PM    GLUCOSE 87 11/22/2019 12:16 PM    BUN 22 11/22/2019 12:16 PM    CREATININE 0.94 11/22/2019 12:16 PM

## 2020-01-09 ENCOUNTER — OFFICE VISIT (OUTPATIENT)
Dept: MEDICAL GROUP | Facility: PHYSICIAN GROUP | Age: 55
End: 2020-01-09
Payer: OTHER GOVERNMENT

## 2020-01-09 VITALS
WEIGHT: 257 LBS | OXYGEN SATURATION: 97 % | TEMPERATURE: 97.4 F | SYSTOLIC BLOOD PRESSURE: 134 MMHG | RESPIRATION RATE: 18 BRPM | BODY MASS INDEX: 31.95 KG/M2 | HEART RATE: 73 BPM | DIASTOLIC BLOOD PRESSURE: 86 MMHG | HEIGHT: 75 IN

## 2020-01-09 DIAGNOSIS — F33.1 MODERATE EPISODE OF RECURRENT MAJOR DEPRESSIVE DISORDER (HCC): ICD-10-CM

## 2020-01-09 DIAGNOSIS — F41.9 ANXIETY: ICD-10-CM

## 2020-01-09 DIAGNOSIS — E78.5 HYPERLIPIDEMIA, UNSPECIFIED HYPERLIPIDEMIA TYPE: ICD-10-CM

## 2020-01-09 PROCEDURE — 99214 OFFICE O/P EST MOD 30 MIN: CPT | Performed by: NURSE PRACTITIONER

## 2020-01-09 RX ORDER — ROSUVASTATIN CALCIUM 10 MG/1
10 TABLET, COATED ORAL EVERY EVENING
Qty: 90 TAB | Refills: 1 | Status: SHIPPED | OUTPATIENT
Start: 2020-01-09 | End: 2020-08-24 | Stop reason: SDUPTHER

## 2020-01-09 NOTE — PROGRESS NOTES
Chief Complaint   Patient presents with   • Follow-Up         This is a 54 y.o.male patient that presents today with the following: Follow-up, discuss medications    Hyperlipidemia  The 10-year ASCVD risk score ([place]cad[place] ORESTES Jr., et al., 2013) is: 8.8%  Patient does agree to starting statin, will start rosuvastatin 10 mg daily.  Discussed the importance of lifestyle modifications as well.  He will follow-up in 6 months with labs done before visit.    Anxiety  Patient here for follow-up of anxiety and depression, at his last visit with me he was started on paroxetine 20 mg daily.  He has been tolerating it well with no significant or bothersome side effects.  He does state that he feels somewhat better, his wife accompanies him today and notices a significant difference.  He agrees to continue on this medication and will follow-up with me in 4 to 6 months, sooner if needed.  He does continue to deny suicidal and homicidal ideations, hallucinations, racing thoughts and flights of ideas.      No visits with results within 1 Month(s) from this visit.   Latest known visit with results is:   Hospital Outpatient Visit on 11/22/2019   Component Date Value   • TSH 11/22/2019 1.180    • Prostatic Specific Antig* 11/22/2019 0.30    • Glycohemoglobin 11/22/2019 5.6    • Est Avg Glucose 11/22/2019 114    • 25-Hydroxy   Vitamin D 25 11/22/2019 14*   • Cholesterol,Tot 11/22/2019 189    • Triglycerides 11/22/2019 104    • HDL 11/22/2019 36*   • LDL 11/22/2019 132*   • Sodium 11/22/2019 139    • Potassium 11/22/2019 3.9    • Chloride 11/22/2019 105    • Co2 11/22/2019 25    • Anion Gap 11/22/2019 9.0    • Glucose 11/22/2019 87    • Bun 11/22/2019 22    • Creatinine 11/22/2019 0.94    • Calcium 11/22/2019 8.9    • AST(SGOT) 11/22/2019 24    • ALT(SGPT) 11/22/2019 25    • Alkaline Phosphatase 11/22/2019 122*   • Total Bilirubin 11/22/2019 0.5    • Albumin 11/22/2019 4.5    • Total Protein 11/22/2019 7.1    • Globulin 11/22/2019 2.6    •  A-G Ratio 11/22/2019 1.7    • WBC 11/22/2019 5.6    • RBC 11/22/2019 5.27    • Hemoglobin 11/22/2019 16.5    • Hematocrit 11/22/2019 48.7    • MCV 11/22/2019 92.4    • MCH 11/22/2019 31.3    • MCHC 11/22/2019 33.9    • RDW 11/22/2019 44.6    • Platelet Count 11/22/2019 253    • MPV 11/22/2019 10.4    • Neutrophils-Polys 11/22/2019 66.20    • Lymphocytes 11/22/2019 23.00    • Monocytes 11/22/2019 9.30    • Eosinophils 11/22/2019 0.90    • Basophils 11/22/2019 0.40    • Immature Granulocytes 11/22/2019 0.20    • Nucleated RBC 11/22/2019 0.00    • Neutrophils (Absolute) 11/22/2019 3.69    • Lymphs (Absolute) 11/22/2019 1.28    • Monos (Absolute) 11/22/2019 0.52    • Eos (Absolute) 11/22/2019 0.05    • Baso (Absolute) 11/22/2019 0.02    • Immature Granulocytes (a* 11/22/2019 0.01    • NRBC (Absolute) 11/22/2019 0.00    • Fasting Status 11/22/2019 Fasting    • GFR If  11/22/2019 >60    • GFR If Non  Ameri* 11/22/2019 >60          clinical course has been stable    Past Medical History:   Diagnosis Date   • Anxiety    • Depression    • Hyperlipidemia    • Hypertension 10/14/2014       Past Surgical History:   Procedure Laterality Date   • DENTAL SURGERY         Family History   Problem Relation Age of Onset   • Heart Disease Father         dysrhythmia with pacer   • Cancer Father         skin cancer   • Cancer Maternal Grandfather         leukemia   • Alcohol/Drug Paternal Grandfather    • Diabetes Neg Hx    • Stroke Neg Hx        Patient has no known allergies.    Current Outpatient Medications Ordered in Epic   Medication Sig Dispense Refill   • rosuvastatin (CRESTOR) 10 MG Tab Take 1 Tab by mouth every evening. 90 Tab 1   • lisinopril-hydrochlorothiazide (PRINZIDE) 20-25 MG per tablet TAKE 1 TABLET BY MOUTH EVERY DAY 90 Tab 1   • PARoxetine (PAXIL) 20 MG Tab Take 1 Tab by mouth every day. 90 Tab 0   • Omega-3 Fatty Acids (FISH OIL) 1000 MG Cap capsule Take 1,000 mg by mouth 3 times a day, with  "meals.       No current Epic-ordered facility-administered medications on file.        Constitutional ROS: No unexpected change in weight, No weakness, No unexplained fevers, sweats, or chills  Pulmonary ROS: No chronic cough, sputum, or hemoptysis, No shortness of breath, No recent change in breathing  Cardiovascular ROS: No chest pain, No edema, No palpitations, Positive for hyperlipidemia  Musculoskeletal/Extremities ROS: No clubbing, No peripheral edema, No pain, redness or swelling on the joints  Neurologic ROS: Normal development, No seizures, No weakness  Psychiatric ROS: Positive per HPI    Physical exam:  /86   Pulse 73   Temp 36.3 °C (97.4 °F) (Temporal)   Resp 18   Ht 1.905 m (6' 3\")   Wt 116.6 kg (257 lb)   SpO2 97%   BMI 32.12 kg/m²   General Appearance: Pleasant middle-aged male, alert, no distress, obese, well-groomed  Skin: Skin color, texture, turgor normal. No rashes or lesions.  Lungs: negative findings: normal respiratory rate and rhythm, normal effort  Musculoskeletal: negative findings: no evidence of joint instability, no evidence of muscle atrophy, no deformities present  Neurologic: intact, CN II through XII grossly intact    Medical decision making/discussion: Patient does agree to starting statin, he will start rosuvastatin 10 mg daily.  Again discussed the importance of lifestyle modifications including healthy diet, regular exercise and efforts towards weight loss.  He will continue on current dose of paroxetine and follow-up with me in 6 months.    Dwight was seen today for follow-up.    Diagnoses and all orders for this visit:    Hyperlipidemia, unspecified hyperlipidemia type  -     rosuvastatin (CRESTOR) 10 MG Tab; Take 1 Tab by mouth every evening.  -     Comp Metabolic Panel; Future  -     Lipid Profile; Future    Anxiety    Moderate episode of recurrent major depressive disorder (HCC)        Return in about 6 months (around 7/9/2020) for Follow-up, Discuss " Labs.        Please note that this dictation was created using voice recognition software. I have made every reasonable attempt to correct obvious errors, but I expect that there are errors of grammar and possibly content that I did not discover before finalizing the note.

## 2020-01-13 NOTE — ASSESSMENT & PLAN NOTE
Patient here for follow-up of anxiety and depression, at his last visit with me he was started on paroxetine 20 mg daily.  He has been tolerating it well with no significant or bothersome side effects.  He does state that he feels somewhat better, his wife accompanies him today and notices a significant difference.  He agrees to continue on this medication and will follow-up with me in 4 to 6 months, sooner if needed.  He does continue to deny suicidal and homicidal ideations, hallucinations, racing thoughts and flights of ideas.

## 2020-01-13 NOTE — ASSESSMENT & PLAN NOTE
The 10-year ASCVD risk score (Wellsjohanny CARLISLE Jr., et al., 2013) is: 8.8%  Patient does agree to starting statin, will start rosuvastatin 10 mg daily.  Discussed the importance of lifestyle modifications as well.  He will follow-up in 6 months with labs done before visit.

## 2020-02-05 DIAGNOSIS — F33.1 MODERATE EPISODE OF RECURRENT MAJOR DEPRESSIVE DISORDER (HCC): ICD-10-CM

## 2020-02-05 DIAGNOSIS — F41.9 ANXIETY: ICD-10-CM

## 2020-02-06 RX ORDER — PAROXETINE HYDROCHLORIDE 20 MG/1
20 TABLET, FILM COATED ORAL DAILY
Qty: 90 TAB | Refills: 1 | Status: SHIPPED | OUTPATIENT
Start: 2020-02-06 | End: 2020-05-04 | Stop reason: SDUPTHER

## 2020-02-06 NOTE — TELEPHONE ENCOUNTER
Was the patient seen in the last year in this department? Yes    Does patient have an active prescription for medications requested? No     Received Request Via: Pharmacy      Pt met protocol?: Yes   Pt last ov 1/9/2020

## 2020-04-01 ENCOUNTER — TELEPHONE (OUTPATIENT)
Dept: DERMATOLOGY | Facility: IMAGING CENTER | Age: 55
End: 2020-04-01

## 2020-04-01 NOTE — TELEPHONE ENCOUNTER
"Spoke with wife Chinyere.  Given provider recommendations to increase dosing from 20mg to 40mg QD.  Chinyere states she will try to get him to agree to this.  States he is in \"weird\" mood today.  Patient again states she feels safe in the home and that if that changes she will reach out.  Patient had no further questions.  "

## 2020-04-01 NOTE — TELEPHONE ENCOUNTER
Call from Chinyere, who is patient's wife, calling to report that patient not doing well on 20mg dose of Paroxetine.  She states that Dwight has increase stress regarding COVID-19 and changes to work schedule.  Has been more agitated and verbally abusive.  Chinyere states she feels safe in the home and denies any physical abuse.  But does feel Dwight has increase in agitation toward her.  Peace does not have appointment with behavioral health until June.  Requesting possible increase in dosing.  Please advise.    Chinyere contact #[phone]

## 2020-04-03 ENCOUNTER — TELEPHONE (OUTPATIENT)
Dept: DERMATOLOGY | Facility: IMAGING CENTER | Age: 55
End: 2020-04-03

## 2020-04-03 NOTE — TELEPHONE ENCOUNTER
Patient wife Chinyere calling to report that patient has not started with increased dose of 40mg QD of paroxetine and states that when she informed him of dose change he didn't believe her and she feels at this point he needs to hear it from provider office.  Attempted to contact patient at ph[facility][phone],  [facility]

## 2020-04-03 NOTE — TELEPHONE ENCOUNTER
Geetha GIPSON,   I spoke with the patient and reviewed recommendation that he can try the paroxetine 40 mg. He is agreeable to this and will try it for a few weeks by taking two of the 20 mg and get back to Geetha if new Rx for 40 mg needed.   Eugenio Gonzalez M.D.

## 2020-05-04 DIAGNOSIS — F41.9 ANXIETY: ICD-10-CM

## 2020-05-04 DIAGNOSIS — F33.1 MODERATE EPISODE OF RECURRENT MAJOR DEPRESSIVE DISORDER (HCC): ICD-10-CM

## 2020-05-04 DIAGNOSIS — N52.9 ERECTILE DYSFUNCTION, UNSPECIFIED ERECTILE DYSFUNCTION TYPE: ICD-10-CM

## 2020-05-04 RX ORDER — PAROXETINE HYDROCHLORIDE 40 MG/1
40 TABLET, FILM COATED ORAL DAILY
Qty: 90 TAB | Refills: 3 | Status: SHIPPED | OUTPATIENT
Start: 2020-05-04 | End: 2021-06-12 | Stop reason: SDUPTHER

## 2020-05-04 RX ORDER — SILDENAFIL 50 MG/1
50 TABLET, FILM COATED ORAL PRN
Qty: 5 TAB | Refills: 3 | Status: SHIPPED | OUTPATIENT
Start: 2020-05-04 | End: 2021-04-14

## 2020-05-18 DIAGNOSIS — M62.838 MUSCLE SPASM: ICD-10-CM

## 2020-07-02 ENCOUNTER — TELEMEDICINE (OUTPATIENT)
Dept: BEHAVIORAL HEALTH | Facility: CLINIC | Age: 55
End: 2020-07-02
Payer: OTHER GOVERNMENT

## 2020-07-02 DIAGNOSIS — F33.1 MODERATE EPISODE OF RECURRENT MAJOR DEPRESSIVE DISORDER (HCC): ICD-10-CM

## 2020-07-02 DIAGNOSIS — F41.1 GENERALIZED ANXIETY DISORDER: ICD-10-CM

## 2020-07-02 PROCEDURE — 90791 PSYCH DIAGNOSTIC EVALUATION: CPT | Performed by: PSYCHOLOGIST

## 2020-07-02 SDOH — HEALTH STABILITY: MENTAL HEALTH: HOW OFTEN DO YOU HAVE A DRINK CONTAINING ALCOHOL?: 2-3 TIMES A WEEK

## 2020-07-02 SDOH — HEALTH STABILITY: MENTAL HEALTH: HOW OFTEN DO YOU HAVE 6 OR MORE DRINKS ON ONE OCCASION?: NEVER

## 2020-07-02 SDOH — HEALTH STABILITY: MENTAL HEALTH: HOW MANY STANDARD DRINKS CONTAINING ALCOHOL DO YOU HAVE ON A TYPICAL DAY?: 1 OR 2

## 2020-07-02 NOTE — BH THERAPY
[facility]  INITIAL ASSESSMENT    Name: Dwight Linares  MRN: [id number]  : 1965  Age: 54 y.o.  Date of assessment: 2020  PCP: MENDEZ Baca  Persons in attendance: Patient  Total session time: 45 minutes    This encounter was conducted via Zoom.   Verbal consent was obtained. Patient's identity was verified.      CHIEF COMPLAINT AND HISTORY OF PRESENTING PROBLEM:  (as stated by Patient):  Dwight Linares is a 54 year old white male referred for assessment by ERASMO Dalton (primary care provider). Patient reported that he has been struggling with episodes of depression for about five or six years. He also reported that he has been having some problems with obsessive-compulsive issues. He is retired from the [employer] for twenty-two years. He reported that he has been  six times and is currently having problems with his marriage. Reviewed the [facility] policies and procedures. Explained the limitations of confidentiality.      Primary presenting issue includes   Chief Complaint   Patient presents with   • Depression   • Stress   • Anxiety   • Sleep Problem   • Initial  Evaluation       FAMILY/SOCIAL HISTORY  Current living situation/household members: Patient is currently living with his wife of three years. This is his sixth marriage.  Relevant family history/structure/dynamics: Patient's parents are alive and remain . He has one sister. He has two children.  Current family/social stressors: Marital conflict  Quality/quantity of current family and/or social support: Patient described his wife as his best support system.  Does patient/parent report a family history of behavioral health issues, diagnoses, or treatment? No  Family History   Problem Relation Age of Onset   • Heart Disease Father         dysrhythmia with pacer   • Cancer Father         skin cancer   • Cancer Maternal Grandfather         leukemia   • Alcohol/Drug Paternal  Grandfather    • Diabetes Neg Hx    • Stroke Neg Hx         BEHAVIORAL HEALTH TREATMENT HISTORY  Does patient/parent report a history of prior behavioral health treatment for patient? No: but he did receive marriage counseling for a few months.    History of untreated behavioral health issues identified? No    MEDICAL HISTORY  Past medical/surgical history:   Past Medical History:   Diagnosis Date   • Anxiety    • Depression    • Hyperlipidemia    • Hypertension 10/14/2014      Past Surgical History:   Procedure Laterality Date   • DENTAL SURGERY          Medication Allergies:  Patient has no known allergies.   Medical history provided by patient during current evaluation: Patient's medical history is well documented in this medical record.    Patient reports last physical exam: 2020  Does patient/parent report any history of or current developmental concerns? No  Does patient/parent report nutritional concerns? No  Does patient/parent report change in appetite or weight loss/gain? No  Does patient/parent report history of eating disorder symptoms? No  Does patient/parent report dental problem? No  Does patient/parent report physical pain? No   Indicate if pain is acute or chronic, and location: N/A   Pain scale rating:       Does patient/parent report functional impact of medical, developmental, or pain issues?   N\A    EDUCATIONAL/LEARNING HISTORY  Is patient currently enrolled in a school/educational program? No:     Highest grade level completed: B.S. degree in homeland security  School performance/functioning: average  History of Special Education/repeated grades/learning issues: no  Preferred learning style: auditory/visual  Current learning needs (large print, language barrier, etc):  None    EMPLOYMENT/RESOURCES  Is the patient currently employed? Yes,   Does the patient/parent report adequate financial resources? Yes  Does patient identify impact of presenting issue on work functioning?  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    [If yes, complete below items]  Does patient report history of exposure to combat? Yes  Does patient report history of  sexual trauma? No  Does patient report other -related stressors? No    SPIRITUAL/CULTURAL/IDENTITY:  What are the patient’s/family’s spiritual beliefs or practices? Restoration  What is the patient’s cultural or ethnic background/identity?   How does the patient identify their sexual orientation? hererosexual  How does the patient identify their gender? male  Does the patient identify any spiritual/cultural/identity factors as relevant to the presenting issue? No    LEGAL HISTORY  Has the patient ever been involved with juvenile, adult, or family legal systems? No   [If yes, trigger section below:]  Does patient report ever being a victim of a crime?  No  Does patient report involvement in any current legal issues?  No  Does patient report ever being arrested or committing a crime? No  Does patient report any current agency (parole/probation/CPS/) involvement? No    ABUSE/NEGLECT/TRAUMA SCREENING  Does patient report feeling “unsafe” in his/her home, or afraid of anyone? No  Does patient report any history of physical, sexual, or emotional abuse? No  Does parent or significant other report any of the above? No  Is there evidence of neglect by self? No  Is there evidence of neglect by a caregiver? No  Does the patient/parent report any history of CPS/APS/police involvement related to suspected abuse/neglect or domestic violence? No  Does the patient/parent report any other history of potentially traumatic life events? No  Based on the information provided during the current assessment, is a mandated report of suspected abuse/neglect being made?  No     SAFETY ASSESSMENT - SELF  Does patient acknowledge current or past  symptoms of dangerousness to self? No  Does parent/significant other report patient has current or past symptoms of dangerousness to self? No      Recent change in frequency/specificity/intensity of suicidal thoughts or self-harm behavior? No  Current access to firearms, medications, or other identified means of suicide/self-harm? Yes  If yes, willing to restrict access to means of suicide/self-harm? not indicated  Protective factors present: Fear of suicide, Future-oriented, Good impulse control, Hopefulness, Moral objection to suicide, Optimism, Positive coping skills, Positive self-efficacy, Strong family connections and Strong socia/community connections    Current Suicide Risk: Low  Crisis Safety Plan completed and copy given to patient: No, but reviewed safety plan with patient.    SAFETY ASSESSMENT - OTHERS  Does paor past symptoms of aggressive behavior or risk to others? No  Does parent/significant othtient acknowledge current or past symptoms of aggressive behavior or risk to others? No  Does parent/significant other report patient has current or past symptoms of aggressive behavior or risk to others? No    Recent change in frequency/specificity/intensity of thoughts or threats to harm others? No  Current access to firearms/other identified means of harm? Yes  If yes, willing to restrict access to weapons/means of harm? not indicated  Protective factors present: Good frustration tolerance, Fear of consequences, Moral/spiritual prohibition, Guilt/remorse for past aggression, Well-developed sense of empathy, Positive impulse-control, Stable relationships and Stable employment    Current Homicide Risk:  Low  Crisis Safety Plan completed and copy given to patient? No  Based on information provided during the current assessment, is a mandated “duty to warn” being exercised? No    SUBSTANCE USE/ADDICTION HISTORY  [] Not applicable - patient 10 years of age or younger    Is there a family history of substance  use/addiction? No  Does patient acknowledge or parent/significant other report use of/dependence on substances? No  Last time patient used alcohol: last week  Within the past week? No  Last time patient used marijuana: denied  Within the past month? No  Any other street drugs ever tried even once? No  Any use of prescription medications/pills without a prescription, or for reasons others than originally prescribed?  No  Any other addictive behavior reported (gambling, shopping, sex)? No     Drug History:  Amphetamine:  Amphetamine frequency: Never used    Cannibis:  Cannabis frequency: Never used    Cocaine:  Cocaine frequency: Never used    Ecstasy:  Ecstasy frequency: Never used    Hallucinogen:  Hallucinogen frequency: Never used    Inhalant:   Inhalant frequency: Never used    Opiate:  Opiate frequency: Never used  Cannabis frequency: Never used    Other:  Other drug frequency: Never used    Sedative:   Sedative frequency: Never used      What consequences does the patient associate with any of the above substance use and or addictive behaviors? None    Patient’s motivation/readiness for change: None    [] Patient denies use of any substance/addictive behaviors    STRENGTHS/ASSETS  Strengths Identified by interviewer: Insight into problems, Evidence of good judgement, Self-awareness, Family suppport, Social support, Stable relationships, Optimism, Problem-solving skills, Cognitive flexibility and Social skills  Strengths Identified by patient: motivated for treatment, insightful    MENTAL STATUS/OBSERVATIONS   Participation: Active verbal participation, Verbally monopolizing, Attentive and Engaged  Grooming: Casual and Neat  Orientation:Alert and Fully Oriented   Behavior: Calm  Eye contact: Good   Mood:Anxious  Affect:Flexible and Congruent with content  Thought process: Logical and Goal-directed  Thought content:  Within normal limits  Speech: Rate within normal limits and Volume within normal  limits  Perception: Within normal limits  Memory: No gross evidence of memory deficits  Insight: Good  Judgment:  Good  Other:    Family/couple interaction observations: N/A      CLINICAL FORMULATION:   Dwight Linares is a 54 year old white male referred for assessment by ERASMO Dalton (primary care provider). Patient reported that he has been struggling with episodes of depression for about five or six years. He also reported that he has been having some problems with obsessive-compulsive issues. He is retired from the [employer] for twenty-two years. He reported that he has been  six times and is currently having problems with his marriage. Patient does appear to be a good candidate for cognitive behavioral therapy.    Patient did not present in acute distress. Patient was appropriately groomed and cooperative. Patient was alert and oriented to person, place, and time. Eye contact was good. No abnormalities in attention or concentration were noted. No abnormalities of movement present; psychomotor activity was normal. Speech was fluent and regular in rhythm, rate, volume, and tone. Thought processes were linear, logical, and goal-directed. There was no evidence of thought disorder, although she did express some paranoid ideation and questionable delusional ideation. No auditory or visual hallucinations. Long and short term memory appeared to be intact. Insight and judgment appeared a bit limited. Impulse control was deemed to be within normal limits. Reported mood was fairly positive. Affect was appropriate and congruent with thought content and conversation. Patient denied current suicidal and homicidal ideation or plan, intent, and preparatory behavior.      DIAGNOSTIC IMPRESSION(S):  1. Moderate episode of recurrent major depressive disorder (HCC)    2. Generalized anxiety disorder          IDENTIFIED NEEDS/PLAN:  [If any of these marked, trigger DISPOSITION  list]  Mood/anxiety  Actively being addressed by [facility]     PLAN/DISPOSITION:     1. The patient will return to the clinic in one to two weeks..  2. Crisis Response Plan:  Reviewed emergency resources with the patient and the patient expressed understanding including:  If feeling suicidal, patient will call or present to the Behavioral Health Clinic during duty hours or present to closest ED ([facility] or [facility], call 911 or crisis hotline ([phone]-OYZL) after duty hours.  3. Referrals/Consults:  N/A  4. Barriers to Learning:  No  5. Readiness to Learn:  Yes  6. Cultural Concerns:  No  7. Patient voiced understanding of, and agreement with, plan and goals as annotated above.  8. Declare these services are medically necessary and appropriate to the patient’s diagnosis and needs  9. The point of contact at the [facility] regarding this evaluation is Dr. Melton, Psychologist.    Does patient express agreement with the above plan? Yes     Referral appointment(s) scheduled? No       Brad Melton, Ph.D.

## 2020-07-07 ENCOUNTER — APPOINTMENT (OUTPATIENT)
Dept: BEHAVIORAL HEALTH | Facility: CLINIC | Age: 55
End: 2020-07-07
Payer: OTHER GOVERNMENT

## 2020-07-16 ENCOUNTER — TELEMEDICINE (OUTPATIENT)
Dept: BEHAVIORAL HEALTH | Facility: CLINIC | Age: 55
End: 2020-07-16
Payer: OTHER GOVERNMENT

## 2020-07-16 DIAGNOSIS — F41.1 GENERALIZED ANXIETY DISORDER: ICD-10-CM

## 2020-07-16 DIAGNOSIS — F33.1 MODERATE EPISODE OF RECURRENT MAJOR DEPRESSIVE DISORDER (HCC): ICD-10-CM

## 2020-07-16 PROCEDURE — 90834 PSYTX W PT 45 MINUTES: CPT | Mod: CR | Performed by: PSYCHOLOGIST

## 2020-07-16 NOTE — BH THERAPY
[facility]  Therapy Progress Note    Patient Name: Dwight Linares  Patient MRN: [id number]  Today's Date: 7/16/2020     Type of session:Individual psychotherapy  Length of session: 45 minutes  Persons in attendance:Patient    This encounter was conducted via Zoom.   Verbal consent was obtained. Patient's identity was verified.     Subjective/New Info:   Dwight Linares is a 54 year old white male referred for assessment by ERASMO Dalton (primary care provider). Patient has been doing a bit better. He is struggling with dealing with his dog's recent diagnosis of cancer, which does not sound good. He reported that he has been struggling with symptoms of obsessive-compulsive symptoms. He has a tendency to check to make sure his door is locked many times. Talked with patient about some behavioral techniques that he might try. Patient does appear to be a good candidate for cognitive behavioral therapy. His wife did offer some collateral information. It does appear as if there are several issues within their marriage and suggested that patient and wife attend marriage counseling. Patient does express that he tends to be anxious frequently because he is afraid that he will do something wrong.      Patient did not present in acute distress. Patient was appropriately groomed and cooperative. Patient was alert and oriented to person, place, and time. Eye contact was good. No abnormalities in attention or concentration were noted. No abnormalities of movement present; psychomotor activity was normal. Speech was fluent and regular in rhythm, rate, volume, and tone. Thought processes were linear, logical, and goal-directed. There was no evidence of thought disorder, although she did express some paranoid ideation and questionable delusional ideation. No auditory or visual hallucinations. Long and short term memory appeared to be intact. Insight and judgment appeared a bit limited. Impulse control  was deemed to be within normal limits. Reported mood was fairly positive. Affect was appropriate and congruent with thought content and conversation. Patient denied current suicidal and homicidal ideation or plan, intent, and preparatory behavior.    Objective/Observations:   Participation: Active verbal participation, Verbally monopolizing, Attentive and Engaged   Grooming: Casual and Neat   Cognition: Alert and Fully Oriented   Eye contact: Good   Mood: Depressed and Anxious   Affect: Flexible and Congruent with content   Thought process: Logical and Goal-directed   Speech: Rate within normal limits and Volume within normal limits   Other:     Diagnoses:   1. Moderate episode of recurrent major depressive disorder (HCC)    2. Generalized anxiety disorder         Current risk:   SUICIDE: Low   Homicide: Low   Self-harm: Low   Relapse: Not applicable   Other:    Safety Plan reviewed? Not Indicated   If evidence of imminent risk is present, intervention/plan:     Therapeutic Intervention(s): Cognitive modification, Conflict resolution skills, Develop/modify treatment plan, Distress tolerance skills, Leisure and recreation skills, Stressors assessed and Supportive psychotherapy    Treatment Goal(s)/Objective(s) addressed:   Reduce symptoms of Depression:  Objective A: Patient will increase activity level by will participating in at least two hours, three times per week in a social or leisure activity with family member or close friend.  Objective B: Patient will express an increase in positive statements about self by  will making at least five positive statements per day about self circumstances.  Objective C: Patient will spend more time with friend in social situations by  spending at least thirty minutes four times per week in a social situation, interacting appropriately with a friend.    Reduce Symptoms of Anxiety:  Objective A: Patient will learn one effective technique for dealing with anxiety each week, and  utilize it effectively when  feeling anxious.  Objective B: Patient will express an increase in positive statements by making at least five positive statements per day about self or current circumstances.  Objective C: Patient will increase activity level by participating in at least two hours, three times per week in a leisure or social activity.       Progress toward Treatment Goals: Mild improvement    Plan:  - Continue Individual therapy    Brad Melton, Ph.D.  7/16/2020

## 2020-07-20 DIAGNOSIS — F33.1 MODERATE EPISODE OF RECURRENT MAJOR DEPRESSIVE DISORDER (HCC): ICD-10-CM

## 2020-07-20 DIAGNOSIS — F41.9 ANXIETY: ICD-10-CM

## 2020-07-21 RX ORDER — PAROXETINE HYDROCHLORIDE 20 MG/1
TABLET, FILM COATED ORAL
Qty: 90 TAB | Refills: 1 | OUTPATIENT
Start: 2020-07-21

## 2020-07-30 ENCOUNTER — TELEMEDICINE (OUTPATIENT)
Dept: BEHAVIORAL HEALTH | Facility: CLINIC | Age: 55
End: 2020-07-30
Payer: OTHER GOVERNMENT

## 2020-07-30 DIAGNOSIS — F41.1 GENERALIZED ANXIETY DISORDER: ICD-10-CM

## 2020-07-30 DIAGNOSIS — F33.1 MODERATE EPISODE OF RECURRENT MAJOR DEPRESSIVE DISORDER (HCC): ICD-10-CM

## 2020-07-30 PROCEDURE — 90834 PSYTX W PT 45 MINUTES: CPT | Mod: 95,CR | Performed by: PSYCHOLOGIST
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
## 2020-08-11 ENCOUNTER — TELEMEDICINE (OUTPATIENT)
Dept: BEHAVIORAL HEALTH | Facility: CLINIC | Age: 55
End: 2020-08-11
Payer: OTHER GOVERNMENT

## 2020-08-11 DIAGNOSIS — F41.1 GENERALIZED ANXIETY DISORDER: ICD-10-CM

## 2020-08-11 DIAGNOSIS — F33.1 MODERATE EPISODE OF RECURRENT MAJOR DEPRESSIVE DISORDER (HCC): ICD-10-CM

## 2020-08-11 PROCEDURE — 90834 PSYTX W PT 45 MINUTES: CPT | Mod: CR | Performed by: PSYCHOLOGIST

## 2020-08-11 NOTE — BH THERAPY
[facility]  Therapy Progress Note    Patient Name: Dwight Linares  Patient MRN: [id number]  Today's Date: 8/11/2020     Type of session:Individual psychotherapy  Length of session: 45 minutes  Persons in attendance:Patient     This encounter was conducted via Zoom.   Verbal consent was obtained. Patient's identity was verified.    Subjective/New Info:   by OCTAVIA DaltonNAriePArie (primary care provider). Patient is doing fairly well. He is struggling with dealing with his dog's cancer. Patient is doing okay. He is concerned about his wife who is having a diagnostic colonoscopy tomorrow. He is also dealing with some issues with one of his son's who has to move. He and his wife are scheduled to see a marriage counselor in two weeks. Talked with patient about some of the conflicts that exist between patient and his wife. He expresses a concern about finances which is apparently responsible for a lot of the tension between patient and wife. Talked with patient about the importance of exercise and nutrition as he tends to have some physical issues that are going to get worse if he does not take more interest in his health. Patient's mood does appear a bit better.        Patient did not present in acute distress. Patient was appropriately groomed and cooperative. Patient was alert and oriented to person, place, and time. Eye contact was good. No abnormalities in attention or concentration were noted. No abnormalities of movement present; psychomotor activity was normal. Speech was fluent and regular in rhythm, rate, volume, and tone. Thought processes were linear, logical, and goal-directed. There was no evidence of thought disorder, although she did express some paranoid ideation and questionable delusional ideation. No auditory or visual hallucinations. Long and short term memory appeared to be intact. Insight and judgment appeared a bit limited. Impulse control was deemed to be within normal limits.  Reported mood was fairly positive. Affect was appropriate and congruent with thought content and conversation. Patient denied current suicidal and homicidal ideation or plan, intent, and preparatory behavior.     Objective/Observations:              Participation: Active verbal participation, Verbally monopolizing, Attentive and Engaged              Grooming: Casual and Neat              Cognition: Alert and Fully Oriented              Eye contact: Good              Mood: Depressed and Anxious              Affect: Flexible and Congruent with content              Thought process: Logical and Goal-directed              Speech: Rate within normal limits and Volume within normal limits              Other:       Diagnoses:   1. Moderate episode of recurrent major depressive disorder (HCC)    2. Generalized anxiety disorder         Current risk:   SUICIDE: Low   Homicide: Low   Self-harm: Low   Relapse: Not applicable   Other:    Safety Plan reviewed? Not Indicated   If evidence of imminent risk is present, intervention/plan:     Therapeutic Intervention(s): Cognitive modification, Distress tolerance skills, Leisure and recreation skills, Stressors assessed and Supportive psychotherapy    Treatment Goal(s)/Objective(s) addressed:   Reduce symptoms of Depression:  Objective A: Patient will increase activity level by will participating in at least two hours, three times per week in a social or leisure activity with family member or close friend.  Objective B: Patient will express an increase in positive statements about self by  will making at least five positive statements per day about self circumstances.  Objective C: Patient will spend more time with friend in social situations by  spending at least thirty minutes four times per week in a social situation, interacting appropriately with a friend.     Reduce Symptoms of Anxiety:  Objective A: Patient will learn one effective technique for dealing with anxiety each week,  and utilize it effectively when  feeling anxious.  Objective B: Patient will express an increase in positive statements by making at least five positive statements per day about self or current circumstances.  Objective C: Patient will increase activity level by participating in at least two hours, three times per week in a leisure or social activity.          Progress toward Treatment Goals: Mild improvement    Plan:  - Continue Individual therapy    Brad Melton, Ph.D.  8/11/2020

## 2020-08-24 DIAGNOSIS — E78.5 HYPERLIPIDEMIA, UNSPECIFIED HYPERLIPIDEMIA TYPE: ICD-10-CM

## 2020-08-24 RX ORDER — ROSUVASTATIN CALCIUM 10 MG/1
10 TABLET, COATED ORAL EVERY EVENING
Qty: 90 TAB | Refills: 3 | Status: SHIPPED | OUTPATIENT
Start: 2020-08-24 | End: 2021-08-13 | Stop reason: SDUPTHER

## 2020-08-27 ENCOUNTER — TELEMEDICINE (OUTPATIENT)
Dept: BEHAVIORAL HEALTH | Facility: CLINIC | Age: 55
End: 2020-08-27
Payer: OTHER GOVERNMENT

## 2020-08-27 DIAGNOSIS — Z63.0 PROBLEMS IN RELATIONSHIP WITH SPOUSE OR PARTNER: ICD-10-CM

## 2020-08-27 DIAGNOSIS — F41.9 ANXIETY: ICD-10-CM

## 2020-08-27 DIAGNOSIS — Z71.89 GRIEF COUNSELING: ICD-10-CM

## 2020-08-27 DIAGNOSIS — F33.1 MODERATE EPISODE OF RECURRENT MAJOR DEPRESSIVE DISORDER (HCC): ICD-10-CM

## 2020-08-27 PROCEDURE — 90837 PSYTX W PT 60 MINUTES: CPT | Performed by: MARRIAGE & FAMILY THERAPIST

## 2020-08-27 SDOH — SOCIAL STABILITY - SOCIAL INSECURITY: PROBLEMS IN RELATIONSHIP WITH SPOUSE OR PARTNER: Z63.0

## 2020-08-27 NOTE — BH THERAPY
" [facility]  Therapy Progress Note    Patient Consents to Zoom Telehealth Therapy    Patient Name: Dwight Linares  Patient MRN: [id number]  Today's Date: 8/27/2020     Type of session:Individual psychotherapy  Length of session: 45 minutes  Persons in attendance:Patient    Subjective/New Info:   Clinician encouraged spouse to openly express thoughts and feelings: Ms. Patel reports resentment and anger; \"been  before with (patient, Mr. Santoyo) we apologize got over it.\" \"He has anger issues and taking medications, paxil, right when covid hit and schedule change, and not taking his medication at our daughter's wedding.\" Further contributed to his irritability and anger issues.    Clinician encouraged spouse to openly express thoughts and feelings:  Mr. Santoyo indicates been  3 years, and feel as if \"I don't know how to do anything, she just wants me to doped up; stress is very high; just came back from a wedding for our daughter.     Psychoeducation on Gottman's principle and couples' therapy goals and treatments. This therapist was able to set in ground rules and discuss biochemical changes with bond and attachment; practicing safety words and distancer/persuer technique; de-escalation skills.     Couples was able to practice and gain insight on positive conflict resolutions skills and assertive healthy and diplomatic communication to set health boundaries and trust again with each other.     Objective/Observations:   Participation: Active verbal participation, Engaged and Open to feedback   Grooming: Casual   Cognition: Fully Oriented   Eye contact: Good   Mood: Depressed and Anxious   Affect: Full range and Congruent with content   Thought process: Logical   Speech: Rate within normal limits   Other:     Diagnoses:   1. Moderate episode of recurrent major depressive disorder (HCC)    2. Anxiety    3. Problems in relationship with spouse or partner    4. Grief counseling         Current " "risk:   SUICIDE: Not applicable   Homicide: Not applicable   Self-harm: Not applicable   Relapse: Not applicable   Other:    Safety Plan reviewed? Not Indicated   If evidence of imminent risk is present, intervention/plan:     Therapeutic Intervention(s): Communication skills, Conflict resolution skills, Distress tolerance skills, Interpersonal effectiveness skills, Maladaptive behavior addressed, Positive behavior reinforced, Self-care skills, Stressors assessed, Supportive psychotherapy, Therapeutic relationship and Therapeutic storytelling    Treatment Goal(s)/Objective(s) addressed: Couple's therapy and [employer] techniques: love bank accounts and 4 horsemen. De-escalation techniques/safe work/time-out/distancer and pursuer techniques. Practicing turning towards each other and profession of love and gratitude, forgiveness.    Progress toward Treatment Goals: No change, First Session    Plan:  - \"Homework\" recommendation: Couples agree to purchase/study on youtube and audio the [employer] book/resource and practice the techniques: love bank accounts and 4 horsemen. De-escalation techniques/safe work/time-out/distancer and pursuer techniques. Practicing turning towards each other and profession of love and gratitude, forgiveness.    SELINA Burgos  8/27/2020                                   "

## 2020-09-01 ENCOUNTER — APPOINTMENT (OUTPATIENT)
Dept: BEHAVIORAL HEALTH | Facility: CLINIC | Age: 55
End: 2020-09-01
Payer: OTHER GOVERNMENT
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
## 2020-09-17 ENCOUNTER — TELEMEDICINE (OUTPATIENT)
Dept: BEHAVIORAL HEALTH | Facility: CLINIC | Age: 55
End: 2020-09-17
Payer: OTHER GOVERNMENT

## 2020-09-17 DIAGNOSIS — F41.9 ANXIETY: ICD-10-CM

## 2020-09-17 DIAGNOSIS — F33.1 MODERATE EPISODE OF RECURRENT MAJOR DEPRESSIVE DISORDER (HCC): ICD-10-CM

## 2020-09-17 DIAGNOSIS — Z63.0 PROBLEMS IN RELATIONSHIP WITH SPOUSE OR PARTNER: ICD-10-CM

## 2020-09-17 PROCEDURE — 90791 PSYCH DIAGNOSTIC EVALUATION: CPT | Mod: CR | Performed by: MARRIAGE & FAMILY THERAPIST

## 2020-09-17 SDOH — SOCIAL STABILITY - SOCIAL INSECURITY: PROBLEMS IN RELATIONSHIP WITH SPOUSE OR PARTNER: Z63.0

## 2020-09-28 NOTE — BH THERAPY
[facility]  Therapy Progress Note      This evaluation was conducted via Zoom using secure and encrypted videoconferencing technology. The patient was in a private location in the [facility] [place].    The patient's identity was confirmed and verbal consent was obtained for this virtual visit.     Patient Name: Dwight Linares  Patient MRN: [id number]  Today's Date: 10/01/2020     Type of session:Couples therapy  Length of session: 45 minutes  Persons in attendance:Patient and Spouse/Partner    Subjective/New Info: Couple reports that is hasn't been too bad, but his tone, and makes things worse then they are. Mr. Santoyo was able to verbalize his thoughts and feelings about being criticizes by his spouse, such as where cups are place/not clean to certain satisfaction.     His wife Amanda verbalizes why she feels hurt by past drinking and past alcoholism in his life/and pornography although currently not watching. They also are open to discuss the miscommunication in sexual intimacy and being able to meet each other half way with intimacy. Ni technique.    Role play and coaching with needs and wants verbling expressing as a couple while holding hands/using the Gottman's principle and negotiating sexual intimacy. Goals: 1. Not talking about ex 'relationship of sex; and gentle sex.   2. Gentle sex 3. Anticipation and courting each other 4. Scheduling times for intimacy and touch.     Touching each other with hugs and kisses before session ends and being mindful of Gottman's principle and Tavia Vega's. Profession of love and heart to heart talk.       Objective/Observations:   Participation: Active verbal participation, Engaged and Open to feedback   Grooming: Casual   Cognition: Fully Oriented   Eye contact: Good   Mood: Depressed and Anxious   Affect: Flexible and Congruent with content   Thought process: Logical   Speech: Rate within normal limits   Other:     Diagnoses:   1. Moderate  episode of recurrent major depressive disorder (HCC)    2. Anxiety    3. Problems in relationship with spouse or partner         Current risk:   SUICIDE: Not applicable   Homicide: Not applicable   Self-harm: Not applicable   Relapse: Not applicable   Other:    Safety Plan reviewed? Not Indicated   If evidence of imminent risk is present, intervention/plan:     Therapeutic Intervention(s): Cognitive modification, Develop/modify treatment plan, Distress tolerance skills, Interpersonal effectiveness skills, Leisure and recreation skills, Maladaptive behavior addressed, Positive behavior reinforced, Problem-solving, Role-playing, Self-care skills, Stressors assessed and Supportive psychotherapy    Treatment Goal(s)/Objective(s) addressed: Psychoeducation on Gottman's principle and couples' therapy goals and treatments. This therapist was able to set in ground rules and discuss biochemical changes with bond and attachment; practicing safety words and distancer/pursuer technique; de-escalation skills. Couples was able to practice and gain insight on positive conflict resolutions skills and assertive healthy and diplomatic communication to set health boundaries and trust again with each other.     Progress toward Treatment Goals: Mild improvement    Plan:  - Patient is in agreement with the above plan:  YES    SELINA Burgos  10/01/2020

## 2020-10-01 ENCOUNTER — TELEMEDICINE (OUTPATIENT)
Dept: BEHAVIORAL HEALTH | Facility: CLINIC | Age: 55
End: 2020-10-01
Payer: OTHER GOVERNMENT

## 2020-10-01 DIAGNOSIS — F33.1 MODERATE EPISODE OF RECURRENT MAJOR DEPRESSIVE DISORDER (HCC): ICD-10-CM

## 2020-10-01 DIAGNOSIS — F41.9 ANXIETY: ICD-10-CM

## 2020-10-01 DIAGNOSIS — Z63.0 PROBLEMS IN RELATIONSHIP WITH SPOUSE OR PARTNER: ICD-10-CM

## 2020-10-01 PROCEDURE — 90834 PSYTX W PT 45 MINUTES: CPT | Mod: 95,CR | Performed by: MARRIAGE & FAMILY THERAPIST

## 2020-10-01 SDOH — SOCIAL STABILITY - SOCIAL INSECURITY: PROBLEMS IN RELATIONSHIP WITH SPOUSE OR PARTNER: Z63.0

## 2020-10-08 ENCOUNTER — TELEMEDICINE (OUTPATIENT)
Dept: BEHAVIORAL HEALTH | Facility: CLINIC | Age: 55
End: 2020-10-08
Payer: OTHER GOVERNMENT

## 2020-10-08 DIAGNOSIS — F33.1 MODERATE EPISODE OF RECURRENT MAJOR DEPRESSIVE DISORDER (HCC): ICD-10-CM

## 2020-10-08 DIAGNOSIS — F41.9 ANXIETY: ICD-10-CM

## 2020-10-08 PROCEDURE — 90834 PSYTX W PT 45 MINUTES: CPT | Mod: 95,CR | Performed by: PSYCHOLOGIST
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
## 2020-10-14 NOTE — BH THERAPY
[facility]  Therapy Progress Note      This evaluation was conducted via Zoom using secure and encrypted videoconferencing technology. The patient was in a private location in the state of [place].    The patient's identity was confirmed and verbal consent was obtained for this virtual visit.     Patient Name: Dwight Linares  Patient MRN: [id number]  Today's Date: 10/15/2020     Type of session:Individual psychotherapy  Length of session: 45 minutes  Persons in attendance:Patient    Subjective/New Info: Ms. Patel reports that she tried to have an interaction with her spouse Mr. Santoyo about what does each other like.     Couple us open to working on self-care and continue to seek individual counseling in tandem with couple's therapy. Spouse also openly verbalize with tears of feeling bad of resentment and emotional pain from the past of their relationships and agreed to work on staying positive and be in the moment; while avoiding talking about the past and waiting for couples therapy to address issues; and to further process with individual therapists.    Couple also reports agreeing to work and apply 100 day commitments of goal: 1. Daily affirmation/self-care measures 2. Managing how to avoid being annoyed all the time/up and down all the time 3. Stress management 4. Time-management (mindfulness apps; deep breathing relaxation skills/YouTube visual imagery). 5. Health and fitness (with strategies/activities for each goals to explore in session and out of session). 6. Stages of Grief and processing thoughts and feelings; honoring emotions.        Objective/Observations:   Participation: Active verbal participation, Engaged and Open to feedback   Grooming: Casual   Cognition: Fully Oriented   Eye contact: Good   Mood: Depressed and Anxious   Affect: Full range and Congruent with content   Thought process: Goal-directed   Speech: Rate within normal limits   Other:     Diagnoses:   1. Moderate episode of  recurrent major depressive disorder (HCC)    2. Anxiety         Current risk:   SUICIDE: Not applicable   Homicide: Not applicable   Self-harm: Not applicable   Relapse: Not applicable   Other:    Safety Plan reviewed? Not Indicated   If evidence of imminent risk is present, intervention/plan:     Therapeutic Intervention(s): Cognitive modification, Communication skills, Distress tolerance skills, Goal-setting, Interpersonal effectiveness skills, Leisure and recreation skills, Maladaptive behavior addressed, Positive behavior reinforced, Role-playing, Stressors assessed, Supportive psychotherapy and Therapeutic storytelling    Treatment Goal(s)/Objective(s) addressed: Practice Gottman's principle, practicing safety words and distancer/pursuer technique; de-escalation skills. Couples was able to practice and gain insight on positive conflict resolutions skills and assertive healthy and diplomatic communication to set health boundaries and trust again with each other. Couple also reports agreeing to work and apply 100 day challenge commitment goals to set a foundation of healing, self-care and health as they explore more healing and discussing past issues; with focus on working towards hopeful future ahead.     Progress toward Treatment Goals: Mild improvement    Plan:  - Patient is in agreement with the above plan:  YES    SELINA Burgos  10/15/2020

## 2020-10-15 ENCOUNTER — TELEMEDICINE (OUTPATIENT)
Dept: BEHAVIORAL HEALTH | Facility: CLINIC | Age: 55
End: 2020-10-15
Payer: OTHER GOVERNMENT

## 2020-10-15 DIAGNOSIS — F41.9 ANXIETY: ICD-10-CM

## 2020-10-15 DIAGNOSIS — L98.9 SKIN LESION: ICD-10-CM

## 2020-10-15 DIAGNOSIS — F33.1 MODERATE EPISODE OF RECURRENT MAJOR DEPRESSIVE DISORDER (HCC): ICD-10-CM

## 2020-10-15 PROCEDURE — 90834 PSYTX W PT 45 MINUTES: CPT | Mod: 95,CR | Performed by: MARRIAGE & FAMILY THERAPIST

## 2020-10-22 ENCOUNTER — TELEMEDICINE (OUTPATIENT)
Dept: BEHAVIORAL HEALTH | Facility: CLINIC | Age: 55
End: 2020-10-22
Payer: OTHER GOVERNMENT

## 2020-10-22 DIAGNOSIS — F41.9 ANXIETY: ICD-10-CM

## 2020-10-22 DIAGNOSIS — F33.1 MODERATE EPISODE OF RECURRENT MAJOR DEPRESSIVE DISORDER (HCC): ICD-10-CM

## 2020-10-22 DIAGNOSIS — Z63.0 PROBLEMS IN RELATIONSHIP WITH SPOUSE OR PARTNER: ICD-10-CM

## 2020-10-22 PROCEDURE — 90834 PSYTX W PT 45 MINUTES: CPT | Mod: 95,CR | Performed by: PSYCHOLOGIST

## 2020-10-22 SDOH — SOCIAL STABILITY - SOCIAL INSECURITY: PROBLEMS IN RELATIONSHIP WITH SPOUSE OR PARTNER: Z63.0

## 2020-10-22 NOTE — BH THERAPY
[facility]  Therapy Progress Note    Patient Name: Dwight Linares  Patient MRN: [id number]  Today's Date: 10/22/2020     Type of session:Individual psychotherapy  Length of session: 45 minutes  Persons in attendance:Patient     This evaluation was conducted via Zoom using secure and encrypted videoconferencing (virtual) technology. The patient was in a private location in the [facility]. Verbal consent was obtained. Patient's identity was verified.    Subjective/New Info:   Patient was referred by ERASMO Dalton (primary care provider). Patient stated that things are going quite a bit better. He and his wife are spending increased time together and have been communicating more effectively. He expressed some concern about his wife's medical problems. Patient continues to read a book on adult children of alcoholics and reported that he is gaining significant insight into his symptoms. Talked briefly about ACOA characteristics and some modifications in his behavior that he has been focused. He is hoping to begin an ACOA support group virtually within the next week or two. Patient also talked about some of his experience in the [employer]. Patient continues to express improved insight into his behavior and necessary modifications. Due to this clinician's [facility] patient will be scheduled with another clinician within the department.             Objective/Observations:              Participation: Active verbal participation, Verbally monopolizing, Attentive and Engaged              Grooming: Casual and Neat              Cognition: Alert and Fully Oriented              Eye contact: Good              Mood: Depressed and Anxious              Affect: Flexible and Congruent with content              Thought process: Logical and Goal-directed              Speech: Rate within normal limits and Volume within normal limits              Other:   Patient did not present in acute distress. Patient was  appropriately groomed and cooperative. Patient was alert and oriented to person, place, and time. Eye contact was good. No abnormalities in attention or concentration were noted. No abnormalities of movement present; psychomotor activity was normal. Speech was fluent and regular in rhythm, rate, volume, and tone. Thought processes were linear, logical, and goal-directed. There was no evidence of thought disorder, although she did express some paranoid ideation and questionable delusional ideation. No auditory or visual hallucinations. Long and short term memory appeared to be intact. Insight and judgment appeared a bit limited. Impulse control was deemed to be within normal limits. Reported mood was fairly positive. Affect was appropriate and congruent with thought content and conversation. Patient denied current suicidal and homicidal ideation or plan, intent, and preparatory behavior.       Diagnoses:   1. Moderate episode of recurrent major depressive disorder (HCC)    2. Anxiety    3. Problems in relationship with spouse or partner         Current risk:   SUICIDE: Low   Homicide: Low   Self-harm: Low   Relapse: Not applicable   Other:    Safety Plan reviewed? Not Indicated   If evidence of imminent risk is present, intervention/plan:     Therapeutic Intervention(s): Cognitive modification, Conflict resolution skills, Distress tolerance skills, Leisure and recreation skills, Stressors assessed and Supportive psychotherapy    Treatment Goal(s)/Objective(s) addressed:   Reduce symptoms of Depression:  Objective A: Patient will increase activity level by will participating in at least two hours, three times per week in a social or leisure activity with family member or close friend.  Objective B: Patient will express an increase in positive statements about self by  will making at least five positive statements per day about self circumstances.  Objective C: Patient will spend more time with friend in social  situations by  spending at least thirty minutes four times per week in a social situation, interacting appropriately with a friend.     Reduce Symptoms of Anxiety:  Objective A: Patient will learn one effective technique for dealing with anxiety each week, and utilize it effectively when  feeling anxious.  Objective B: Patient will express an increase in positive statements by making at least five positive statements per day about self or current circumstances.  Objective C: Patient will increase activity level by participating in at least two hours, three times per week in a leisure or social activity.       Progress toward Treatment Goals: Moderate improvement    Plan:  - Continue Individual therapy    Brad Melton, Ph.D.  10/22/2020

## 2020-10-31 ENCOUNTER — HOSPITAL ENCOUNTER (OUTPATIENT)
Dept: LAB | Facility: MEDICAL CENTER | Age: 55
End: 2020-10-31
Attending: PODIATRIST
Payer: OTHER GOVERNMENT

## 2020-10-31 LAB
ALBUMIN SERPL BCP-MCNC: 4.4 G/DL (ref 3.2–4.9)
ALP SERPL-CCNC: 136 U/L (ref 30–99)
ALT SERPL-CCNC: 32 U/L (ref 2–50)
AST SERPL-CCNC: 33 U/L (ref 12–45)
BILIRUB CONJ SERPL-MCNC: <0.2 MG/DL (ref 0.1–0.5)
BILIRUB INDIRECT SERPL-MCNC: ABNORMAL MG/DL (ref 0–1)
BILIRUB SERPL-MCNC: 0.3 MG/DL (ref 0.1–1.5)
PROT SERPL-MCNC: 7.3 G/DL (ref 6–8.2)

## 2020-10-31 PROCEDURE — 80076 HEPATIC FUNCTION PANEL: CPT

## 2020-10-31 PROCEDURE — 36415 COLL VENOUS BLD VENIPUNCTURE: CPT

## 2020-11-30 NOTE — BH THERAPY
[facility]  Therapy Progress Note      This evaluation was conducted via Zoom using secure and encrypted videoconferencing technology. The patient was in a private location in the [facility].    The patient's identity was confirmed and verbal consent was obtained for this virtual visit.     Patient Name: Dwight Linares  Patient MRN: [id number]  Today's Date: 12/01/2020     Type of session:Couples therapy  Length of session: 45 minutes  Persons in attendance:Spouse/Partner    Subjective/New Info:  reports that he has been trying to make an appt to see another male therapist, but appt may be unavailable until May; thus patient has plans to look elsewhere for a male therapist.     This therapist recommended a few referral centers who have male therapist. Couple also reports on frustrations with exspouse of Natanael and being open to checking in with each other when ex's contact's Natanael or Amanda.     Objective/Observations:   Participation: Active verbal participation, Engaged and Open to feedback   Grooming: Casual   Cognition: Fully Oriented   Eye contact: Good   Mood: Depressed and Anxious   Affect: Full range and Congruent with content   Thought process: Logical   Speech: Rate within normal limits   Other:     Diagnoses:   1. Moderate episode of recurrent major depressive disorder (HCC)    2. Anxiety         Current risk:   SUICIDE: Not applicable   Homicide: Not applicable   Self-harm: Not applicable   Relapse: Not applicable   Other:    Safety Plan reviewed? Not Indicated   If evidence of imminent risk is present, intervention/plan:     Therapeutic Intervention(s): Cognitive modification, Distress tolerance skills, Interpersonal effectiveness skills, Leisure and recreation skills, Maladaptive behavior addressed, Self-care skills, Stressors assessed and Supportive psychotherapy    Treatment Goal(s)/Objective(s) addressed: Practice Gottman's principle, practicing safety words and  "distancer/pursuer technique; de-escalation skills. Couples was able to practice and gain insight on positive conflict resolutions skills and assertive healthy and diplomatic communication to set health boundaries and trust again with each other.     Progress toward Treatment Goals: Mild improvement    Plan:  - \"Homework\" recommendation: Assertive communication and healthy boundary; and communication.Subconscious and unlearned behavior; resource [employer]  - Patient is in agreement with the above plan:  YES    SELINA Burgos  12/01/2020                                   "

## 2020-12-01 ENCOUNTER — TELEMEDICINE (OUTPATIENT)
Dept: BEHAVIORAL HEALTH | Facility: CLINIC | Age: 55
End: 2020-12-01
Payer: OTHER GOVERNMENT

## 2020-12-01 DIAGNOSIS — F33.1 MODERATE EPISODE OF RECURRENT MAJOR DEPRESSIVE DISORDER (HCC): ICD-10-CM

## 2020-12-01 DIAGNOSIS — F41.9 ANXIETY: ICD-10-CM

## 2020-12-01 PROCEDURE — 90834 PSYTX W PT 45 MINUTES: CPT | Mod: 95,CR | Performed by: MARRIAGE & FAMILY THERAPIST

## 2021-01-07 ENCOUNTER — PATIENT MESSAGE (OUTPATIENT)
Dept: MEDICAL GROUP | Facility: PHYSICIAN GROUP | Age: 56
End: 2021-01-07

## 2021-01-14 RX ORDER — ONDANSETRON 8 MG/1
8 TABLET, ORALLY DISINTEGRATING ORAL EVERY 8 HOURS PRN
Qty: 20 TAB | Refills: 0 | Status: SHIPPED | OUTPATIENT
Start: 2021-01-14 | End: 2021-04-14

## 2021-01-19 ENCOUNTER — TELEPHONE (OUTPATIENT)
Dept: MEDICAL GROUP | Facility: PHYSICIAN GROUP | Age: 56
End: 2021-01-19

## 2021-01-19 NOTE — TELEPHONE ENCOUNTER
Patient called and stated he continues to have cough and congestion since having COVID. He has been using mucinex OTC during the day and Nyquil at night.    Patient would like to know if you can prescribe something to help.    Please advise

## 2021-02-25 ENCOUNTER — APPOINTMENT (OUTPATIENT)
Dept: BEHAVIORAL HEALTH | Facility: CLINIC | Age: 56
End: 2021-02-25
Payer: OTHER GOVERNMENT

## 2021-03-04 ENCOUNTER — TELEMEDICINE (OUTPATIENT)
Dept: BEHAVIORAL HEALTH | Facility: CLINIC | Age: 56
End: 2021-03-04
Payer: OTHER GOVERNMENT

## 2021-03-04 DIAGNOSIS — F32.0 CURRENT MILD EPISODE OF MAJOR DEPRESSIVE DISORDER WITHOUT PRIOR EPISODE (HCC): ICD-10-CM

## 2021-03-04 PROCEDURE — 90791 PSYCH DIAGNOSTIC EVALUATION: CPT | Performed by: MARRIAGE & FAMILY THERAPIST

## 2021-03-04 ASSESSMENT — PATIENT HEALTH QUESTIONNAIRE - PHQ9
4. FEELING TIRED OR HAVING LITTLE ENERGY: NOT AT ALL
2. FEELING DOWN, DEPRESSED, IRRITABLE, OR HOPELESS: NOT AT ALL
8. MOVING OR SPEAKING SO SLOWLY THAT OTHER PEOPLE COULD HAVE NOTICED. OR THE OPPOSITE, BEING SO FIGETY OR RESTLESS THAT YOU HAVE BEEN MOVING AROUND A LOT MORE THAN USUAL: NOT AT ALL
6. FEELING BAD ABOUT YOURSELF - OR THAT YOU ARE A FAILURE OR HAVE LET YOURSELF OR YOUR FAMILY DOWN: SEVERAL DAYS
SUM OF ALL RESPONSES TO PHQ9 QUESTIONS 1 AND 2: 0
SUM OF ALL RESPONSES TO PHQ QUESTIONS 1-9: 2
7. TROUBLE CONCENTRATING ON THINGS, SUCH AS READING THE NEWSPAPER OR WATCHING TELEVISION: SEVERAL DAYS
1. LITTLE INTEREST OR PLEASURE IN DOING THINGS: NOT AT ALL
3. TROUBLE FALLING OR STAYING ASLEEP OR SLEEPING TOO MUCH: NOT AT ALL
9. THOUGHTS THAT YOU WOULD BE BETTER OFF DEAD, OR OF HURTING YOURSELF: NOT AT ALL
5. POOR APPETITE OR OVEREATING: NOT AT ALL

## 2021-03-05 NOTE — BH THERAPY
[facility]  INITIAL ASSESSMENT    This evaluation was conducted via Zoom using secure and encrypted videoconferencing technology. The patient was in a private location in the [facility].    The patient's identity was confirmed and verbal consent was obtained for this virtual visit.     Name: Dwight Linares  MRN: [id number]  : 1965  Age: 55 y.o.  Date of assessment: 3/4/2021  PCP: MENDEZ Baca  Persons in attendance: Patient  Total session time: 45 minutes    Patient's identity and location was verified.  Therapist reviewed limits of confidentiality. Therapist verbally reviewed informed consent for therapy due to session being conducted virtually. Therapist discussed risks/benefits and expectations for services. Patient provided verbal consent for psychotherapy services.     CHIEF COMPLAINT AND HISTORY OF PRESENTING PROBLEM:  (as stated by Patient):  Dwight Linares is a 55 y.o., White male referred for assessment by Self-Referred, Patient.  Primary presenting issue includes   Chief Complaint   Patient presents with   • Aggressive Behavior   • Anxiety     • Depression   • Stress   • History of childhood relational problems   • Sleep Problem   • Initial  Evaluation     Concerns regarding his current marriage and 5 previous ones.  Relational concerns regarding his children      FAMILY/SOCIAL HISTORY  Current living situation/household members: Patient is currently living with his wife. This is his sixth marriage.  Relevant family history/structure/dynamics: Patient's parents are alive and remain . He has one sister. He has two children.  Family of origin history:   Current family/social stressors: Arguments with his wife, loss of relationship with his father, lloss of relationship with his children.  Quality/quantity of current family and/or social support: Patient stated that his wife is his greatest support.  Does patient/parent report a family history of behavioral health  issues, diagnoses, or treatment? No  Family History   Problem Relation Age of Onset   • Heart Disease Father         dysrhythmia with pacer   • Cancer Father         skin cancer   • Cancer Maternal Grandfather         leukemia   • Alcohol/Drug Paternal Grandfather    • Diabetes Neg Hx    • Stroke Neg Hx         BEHAVIORAL HEALTH TREATMENT HISTORY  Does patient/parent report a history of prior behavioral health treatment for patient? Yes:    Dates Level of Care Facilty/Provider Diagnosis/Problem Medications   12/2019 - 8/2020 Outpatient Brad Melton PhD Depression    8/2020 - 12/2020 Outpatient BRIDGETT Farmer Depression                                                                  History of untreated behavioral health issues identified? No    MEDICAL HISTORY    Primary care behavioral health screenings: Patient Health Questionaire                                     If depressive symptoms identified deferred to follow up visit unless specifically addressed in assesment and plan.    Interpretation of PHQ-9 Total Score   Score Severity   1-4 No Depression   5-9 Mild Depression   10-14 Moderate Depression   15-19 Moderately Severe Depression   20-27 Severe Depression         Medical history provided by patient during current evaluation: No additional medical information    Depression Screen (PHQ-2/PHQ-9) 1/10/2019 11/21/2019 3/4/2021   PHQ-2 Total Score - - 0   PHQ-2 Total Score - - -   PHQ-2 Total Score 0 4 -   PHQ-9 Total Score - - 2   PHQ-9 Total Score - 10 -         Past medical/surgical history:   Past Medical History:   Diagnosis Date   • Anxiety    • Depression    • Hyperlipidemia    • Hypertension 10/14/2014      Past Surgical History:   Procedure Laterality Date   • DENTAL SURGERY          Medication Allergies:  Patient has no known allergies.     Does patient/parent report any history of or current developmental concerns? No  Does patient/parent report nutritional concerns? No  Does patient/parent  report change in appetite or weight loss/gain? No  Does patient/parent report history of eating disorder symptoms? No  Does patient/parent report physical pain? No  Indicate if pain is acute or chronic, and location: N/A   Pain scale rating:       Does patient/parent report functional impact of medical, developmental, or pain issues?   no       EDUCATIONAL/LEARNING HISTORY  Is patient currently enrolled in a school/educational program?   No:   Highest grade level completed: B.S. Degree in homeland security  School performance/functioning: Average  History of Special Education/repeated grades/learning issues: no  Preferred learning style: auditory/visual  Current learning needs (large print, language barrier, etc):  No    EMPLOYMENT/RESOURCES  Is the patient currently employed? Yes,   History of employment:   Does the patient/parent report adequate financial resources? Yes  Does patient identify impact of presenting issue on work functioning? 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     [If yes, complete below items]  Does patient report history of exposure to combat? Yes  Does patient report history of  sexual trauma? No  Does patient report other -related stressors? No    SPIRITUAL/CULTURAL/IDENTITY:  What are the patient’s/family’s spiritual beliefs or practices? Faith  What is the patient’s cultural or ethnic background/identity?   How does the patient identify their sexual orientation? Heterosexual  How does the patient identify their gender? Male  Does the patient identify any spiritual/cultural/identity factors as relevant to the presenting issue? No    LEGAL HISTORY  Has the patient ever been involved with juvenile, adult, or family legal systems? No   [If yes, trigger section below:]  Does patient report ever being a victim of a crime?  No  Does patient report  involvement in any current legal issues?  No  Does patient report ever being arrested or committing a crime? No  Does patient report any current agency (parole/probation/CPS/) involvement? No    ABUSE/NEGLECT/TRAUMA SCREENING  Does patient report feeling “unsafe” in his/her home, or afraid of anyone? No  Does patient report any history of physical, sexual, or emotional abuse? No  Does the patient report any history of CPS/APS/police involvement related to suspected abuse/neglect or domestic violence? No  Does the patient report any other history of potentially traumatic life events? No  Based on the information provided during the current assessment, is a mandated report of suspected abuse/neglect being made?  No     SAFETY ASSESSMENT - SELF  Does patient acknowledge current or past symptoms of dangerousness to self? No      Recent change in frequency/specificity/intensity of suicidal thoughts or self-harm behavior? No  Current access to firearms, medications, or other identified means of suicide/self-harm? Yes  If yes, willing to restrict access to means of suicide/self-harm? Yes  Protective factors present: Fear of suicide, Good impulse control, Optimism, Positive coping skills, Strong family connections and Strong socia/community connections    Current Suicide Risk: Low  Crisis Safety Plan completed and copy given to patient: No    SAFETY ASSESSMENT - OTHERS  Does patient report past symptoms of aggressive behavior or risk to others? No    Recent change in frequency/specificity/intensity of thoughts or threats to harm others? No  Current access to firearms/other identified means of harm? Yes  If yes, willing to restrict access to weapons/means of harm? Yes  Protective factors present: Moral/spiritual prohibition, Guilt/remorse for past aggression, Well-developed sense of empathy, Stable relationships, Stable employment and great care for others    Current Homicide Risk:  Low  Crisis Safety Plan  completed and copy given to patient? No  Based on information provided during the current assessment, is a mandated “duty to warn” being exercised? No    SUBSTANCE USE/ADDICTION HISTORY    Is there a family history of substance use/addiction? Yes  Does patient acknowledge or report use of/dependence on substances? No  Last time patient used alcohol: rare  Within the past week? No  Last time patient used marijuana: No  Within the past month? No  Any other street drugs ever tried even once? No  Any use of prescription medications/pills without a prescription, or for reasons others than originally prescribed?  No  Any other addictive behavior reported (gambling, shopping, sex)? No     Amphetamine:  Amphetamine frequency: Never used      Cannibis:  Cannabis frequency: Never used      Cocaine:  Cocaine frequency: Never used      Ecstasy:  Ecstasy frequency: Never used      Hallucinogen:  Hallucinogen frequency: Never used      Inhalant:   Inhalant frequency: Never used      Opiate:  Opiate frequency: Never used  Cannabis frequency: Never used      Other:  Other drug frequency: Never used      Sedative:   Sedative frequency: Never used      What consequences does the patient associate with any of the above substance use and or addictive behaviors? None    Patient’s motivation/readiness for change: Good. Patient stated that he need to make this marriage work.    [] Patient denies use of any substance/addictive behaviors    STRENGTHS/ASSETS  Strengths Identified by interviewer: Self-awareness, Family suppport and Effeectively addressed past stressors/challenges  Strengths Identified by patient: Trustworthy, empathetic    Hobbies/Interests: Bicycle riding, work, eating and shooting    MENTAL STATUS/OBSERVATIONS   Participation: Active verbal participation, Attentive, Engaged and Open to feedback  Grooming: Good and Casual  Orientation:Alert and Fully Oriented   Behavior: Calm  Eye contact: Good   Mood:Euthymic and  Anxious  Affect:Full range and Congruent with content  Thought process: Logical and Goal-directed  Thought content:  Within normal limits  Speech: Rate within normal limits and Volume within normal limits  Perception: Within normal limits  Memory: No gross evidence of memory deficits  Insight: Adequate  Judgment:  Adequate  Other:       CLINICAL FORMULATION:   Dwight Linares is a 55 year old white male self-referred for an assessment. Patient reported that he has been struggling with episodes of depression for about five or six years. He also reported that he has been having some problems with obsessive-compulsive issues. He is retired from the [employer] for twenty-two years. He reported that he has been  six times and is currently having problems with his marriage.   Patients stated that he has had regrets in life and struggles with a very bad temper.  His father is recovering from alcohol abuse and the patient would like to revive their relationship.  Patients stated that one of his sons is alfaro and he is accepting but struggling.  Patient does appear to be a good candidate for cognitive behavioral therapy.      DIAGNOSTIC IMPRESSION(S):  1. Current mild episode of major depressive disorder without prior episode (HCC)          IDENTIFIED NEEDS/PLAN:  [If any of these marked, trigger DISPOSITION list]  Mood/anxiety and Family/Couples conflict  Actively being addressed by [facility]      Does patient express agreement with the above plan? Yes     Referral appointment(s) scheduled? No       ANGIE Otoole.    This dictation has been created using voice recognition software and/or scribes. The accuracy of the dictation is limited by the abilities of the software and the expertise of the scribes. I expect there may be some errors of grammar and possibly content. I made every attempt to manually correct the errors within my dictation. However, errors related to voice  recognition software and/or scribes may still exist and should be interpreted within the appropriate context.

## 2021-04-04 ENCOUNTER — PATIENT MESSAGE (OUTPATIENT)
Dept: MEDICAL GROUP | Facility: PHYSICIAN GROUP | Age: 56
End: 2021-04-04

## 2021-04-05 NOTE — TELEPHONE ENCOUNTER
From: Dwight Linares  To: Nurse Practitioner Geetha Meza  Sent: 4/4/2021 4:26 PM PDT  Subject: Non-Urgent Medical Question    Hello when can I get in to see you? I have a constant dull pain in my right arm where it meets my shoulder. I don’t want to take more pills. Can I get an appointment soon?

## 2021-04-06 ENCOUNTER — PATIENT MESSAGE (OUTPATIENT)
Dept: MEDICAL GROUP | Facility: PHYSICIAN GROUP | Age: 56
End: 2021-04-06

## 2021-04-06 NOTE — TELEPHONE ENCOUNTER
From: Dwight Linares  To: Medical Assistant Brittney MENDEZ  Sent: 4/6/2021 1:25 PM PDT  Subject: Non-Urgent Medical Question    Anything for the 14th or 15th will it be in person or virtual?      ----- Message -----   From:Medical Assistant Brittney MENDEZ   Sent:4/5/2021 7:42 AM PDT   To:Dwight Linares   Subject:RE: Non-Urgent Medical Question    Unfortunately I don't have anything for geetha until next Monday, would you like me to schedule you something and if she has someone cancel sooner I can call you. Or I can help you schedule with Urgent care       ----- Message -----   From:Dwight Linares   Sent:4/4/2021 4:26 PM PDT   To:Nurse Practitioner Geetha Meza   Subject:Non-Urgent Medical Question    Hello when can I get in to see you? I have a constant dull pain in my right arm where it meets my shoulder. I don’t want to take more pills. Can I get an appointment soon?

## 2021-04-06 NOTE — TELEPHONE ENCOUNTER
From: Dwight Linares  To: Medical Assistant Brittney MENDEZ  Sent: 4/6/2021 4:09 PM PDT  Subject: Non-Urgent Medical Question    I can’t tomorrow. Anything available for the dates I listed before?      ----- Message -----   From:Medical Assistant Brittney MENDEZ   Sent:4/6/2021 1:30 PM PDT   To:Dwight Linares   Subject:RE: Non-Urgent Medical Question    I had someone cancel tomorrow at 3 would you like this. In person may be a better option so we can evaluate your arm/shoulder       ----- Message -----   From:Dwight Linares   Sent:4/6/2021 1:25 PM PDT   To:Medical Assistant Brittney MENDEZ   Subject:Non-Urgent Medical Question    Anything for the 14th or 15th will it be in person or virtual?      ----- Message -----   From:Medical Assistant Brittney MENDEZ   Sent:4/5/2021 7:42 AM PDT    To:Dwight Linares   Subject:RE: Non-Urgent Medical Question    Unfortunately I don't have anything for geetha until next Monday, would you like me to schedule you something and if she has someone cancel sooner I can call you. Or I can help you schedule with Urgent care       ----- Message -----   From:Dwight Linares   Sent:4/4/2021 4:26 PM PDT   To:Nurse Practitioner Geetha Meza   Subject:Non-Urgent Medical Question    Hello when can I get in to see you? I have a constant dull pain in my right arm where it meets my shoulder. I don’t want to take more pills. Can I get an appointment soon?

## 2021-04-14 ENCOUNTER — OFFICE VISIT (OUTPATIENT)
Dept: MEDICAL GROUP | Facility: PHYSICIAN GROUP | Age: 56
End: 2021-04-14
Payer: OTHER GOVERNMENT

## 2021-04-14 VITALS
BODY MASS INDEX: 33.82 KG/M2 | HEIGHT: 75 IN | DIASTOLIC BLOOD PRESSURE: 84 MMHG | OXYGEN SATURATION: 95 % | RESPIRATION RATE: 20 BRPM | TEMPERATURE: 97.9 F | WEIGHT: 272 LBS | HEART RATE: 111 BPM | SYSTOLIC BLOOD PRESSURE: 138 MMHG

## 2021-04-14 DIAGNOSIS — R39.9 LOWER URINARY TRACT SYMPTOMS (LUTS): ICD-10-CM

## 2021-04-14 DIAGNOSIS — E78.5 HYPERLIPIDEMIA, UNSPECIFIED HYPERLIPIDEMIA TYPE: ICD-10-CM

## 2021-04-14 DIAGNOSIS — M25.511 ACUTE PAIN OF RIGHT SHOULDER: ICD-10-CM

## 2021-04-14 DIAGNOSIS — I10 ESSENTIAL HYPERTENSION: ICD-10-CM

## 2021-04-14 DIAGNOSIS — L98.9 SKIN LESION: ICD-10-CM

## 2021-04-14 PROCEDURE — 99214 OFFICE O/P EST MOD 30 MIN: CPT | Performed by: NURSE PRACTITIONER

## 2021-04-14 ASSESSMENT — FIBROSIS 4 INDEX: FIB4 SCORE: 1.291238469992825914

## 2021-04-14 NOTE — ASSESSMENT & PLAN NOTE
Patient reports lower urinary tract symptoms especially perineal pain  He is quite concerned for prostate cancer and would like testing  This is reasonable, diagnostic PSA and urinalysis ordered in addition to other routine fasting labs  He does not describe pain within the testicles nor does he describe urinary frequency, urgency, hesitancy or frequency

## 2021-04-14 NOTE — ASSESSMENT & PLAN NOTE
Chronic and stable  Adequately controlled on current medications blood pressure today within normal limits and denies symptoms of hypertension  Past due for labs

## 2021-04-14 NOTE — ASSESSMENT & PLAN NOTE
The 10-year ASCVD risk score (Malachi CARLISLE Jr., et al., 2013) is: 10.1%  Currently on statin, takes rosuvastatin 10 mg daily  Past due for labs  Does not need refills at this time

## 2021-04-14 NOTE — PROGRESS NOTES
Chief Complaint   Patient presents with   • Shoulder Pain       HISTORY OF PRESENT ILLNESS: Patient is a 55 y.o. male established patient who presents today to discuss shoulder pain and other concerns    Skin lesion  Patient requesting updated referral to dermatology, sees dermatology on a regular basis for overall skin checks  New referral has been placed per his request    Lower urinary tract symptoms (LUTS)  Patient reports lower urinary tract symptoms especially perineal pain  He is quite concerned for prostate cancer and would like testing  This is reasonable, diagnostic PSA and urinalysis ordered in addition to other routine fasting labs  He does not describe pain within the testicles nor does he describe urinary frequency, urgency, hesitancy or frequency    Hypertension  Chronic and stable  Adequately controlled on current medications blood pressure today within normal limits and denies symptoms of hypertension  Past due for labs    Hyperlipidemia  The 10-year ASCVD risk score (Omahajohanny CARLISLE Jr., et al., 2013) is: 10.1%  Currently on statin, takes rosuvastatin 10 mg daily  Past due for labs  Does not need refills at this time    Acute pain of right shoulder  Patient has new onset pain of the right shoulder, this started about 3 to 4 weeks ago  This is not related to any particular injury  He does have full range of motion has mild tenderness over bicep tendon  He does agree to referral to physical therapy for consultation  He was advised of supportive care including gentle range of motion exercises and stretches, heat or ice, over-the-counter analgesics        Patient Active Problem List    Diagnosis Date Noted   • Acute pain of right shoulder 04/14/2021   • Lower urinary tract symptoms (LUTS) 04/14/2021   • Problems in relationship with spouse or partner 08/27/2020   • Grief counseling 08/27/2020   • Anxiety 11/21/2019   • Major depressive disorder 11/21/2019   • Elevated fasting glucose 11/21/2019   • Cough  07/10/2018   • Rectal lump 07/10/2018   • Cerumen debris on tympanic membrane of both ears 07/10/2018   • Toenail fungus 2018   • Elevated liver function tests 2018   • Obesity (BMI 30-39.9) 10/18/2017   • Eczema 10/18/2017   • Family history of prostate cancer in father 10/18/2017   • Erectile dysfunction 10/18/2017   • Environmental allergies 2016   • Hypertension 10/14/2014   • Skin lesion 10/14/2014   • Chronic low back pain 10/14/2014   • Hyperlipidemia 10/14/2014       Allergies:Patient has no known allergies.    Current Outpatient Medications   Medication Sig Dispense Refill   • rosuvastatin (CRESTOR) 10 MG Tab Take 1 Tab by mouth every evening. 90 Tab 3   • lisinopril-hydrochlorothiazide (PRINZIDE) 20-25 MG per tablet Take 1 Tab by mouth every day. 90 Tab 3   • Misc. Devices Misc Please administer therapeutic massage, as needed 1 Unknown 0   • PARoxetine (PAXIL) 40 MG tablet Take 1 Tab by mouth every day. 90 Tab 3   • Omega-3 Fatty Acids (FISH OIL) 1000 MG Cap capsule Take 1,000 mg by mouth 3 times a day, with meals.       No current facility-administered medications for this visit.       Social History     Tobacco Use   • Smoking status: Former Smoker     Packs/day: 0.00     Types: Cigars     Quit date: 2019     Years since quittin.0   • Smokeless tobacco: Former User   • Tobacco comment: once a month cigar   Substance Use Topics   • Alcohol use: Yes     Alcohol/week: 1.2 oz     Types: 2 Cans of beer per week     Comment: occ   • Drug use: No       Family Status   Relation Name Status   • Mo  Alive   • Fa  Alive   • MGFa     • PGFa     • Neg Hx  (Not Specified)     Family History   Problem Relation Age of Onset   • Heart Disease Father         dysrhythmia with pacer   • Cancer Father         skin cancer   • Cancer Maternal Grandfather         leukemia   • Alcohol/Drug Paternal Grandfather    • Diabetes Neg Hx    • Stroke Neg Hx        Review of Systems:  "  Constitutional: Negative for fever, chills, weight loss and malaise/fatigue.   HENT: Negative for ear pain, nosebleeds, congestion, sore throat and neck pain.    Eyes: Negative for blurred vision.   Respiratory: Negative for cough, sputum production, shortness of breath and wheezing.    Cardiovascular: Negative for chest pain, palpitations, orthopnea and leg swelling.   Gastrointestinal: Negative for heartburn, nausea, vomiting and abdominal pain.   Genitourinary/Renal: Positive per HPI  Musculoskeletal: Positive per HPI.   Skin: Negative for rash and itching.   Neurological: Negative for dizziness, tingling, tremors, sensory change, focal weakness and headaches.       Exam:  /84   Pulse (!) 111   Temp 36.6 °C (97.9 °F) (Temporal)   Resp 20   Ht 1.905 m (6' 3\")   Wt 123 kg (272 lb)   SpO2 95%   General:  Well nourished, well developed male in NAD  HEENT: eyes clear, conjunctiva normal, PERRLA,TMs normal; bilaterally  Neck: Supple without JVD or bruit. Thyroid is not enlarged.  Pulmonary: Clear to ausculation. Normal effort. No rales, ronchi, or wheezing.  Cardiovascular: Regular rate and rhythm without murmur. Carotid and radial pulses are intact and equal bilaterally.  Abdomen: soft, non-tender, positive bowel sounds  Musculoskeletal: Mild tenderness with palpation to right bicep tendon  Psych/mental: no depression, anxiety, hallucinations  Neuro: alert, intact, CN 2-12 grossly intact    Medical decision-making and discussion:    As mentioned above, will refer to physical therapy, also place new referral to dermatology.  Due for routine fasting labs.  He will be notified of results and further actions if needed.  Advised to take all medications as prescribed and call for refills as needed        Assessment/Plan:  Dwight was seen today for shoulder pain.    Diagnoses and all orders for this visit:    Hyperlipidemia, unspecified hyperlipidemia type  -     Lipid Profile; Future  -     Comp Metabolic " Panel; Future    Acute pain of right shoulder  -     REFERRAL TO PHYSICAL THERAPY    Essential hypertension  -     Lipid Profile; Future  -     Comp Metabolic Panel; Future  -     CBC WITH DIFFERENTIAL; Future    Lower urinary tract symptoms (LUTS)  -     PROSTATE SPECIFIC AG DIAGNOSTIC; Future  -     URINALYSIS,CULTURE IF INDICATED; Future    Skin lesion  -     REFERRAL TO DERMATOLOGY         Return if symptoms worsen or fail to improve, for Follow-up.    Please note that this dictation was created using voice recognition software. I have made every reasonable attempt to correct obvious errors, but I expect that there are errors of grammar and possibly content that I did not discover before finalizing the note.     Griseofulvin Counseling:  I discussed with the patient the risks of griseofulvin including but not limited to photosensitivity, cytopenia, liver damage, nausea/vomiting and severe allergy.  The patient understands that this medication is best absorbed when taken with a fatty meal (e.g., ice cream or french fries).

## 2021-04-14 NOTE — ASSESSMENT & PLAN NOTE
Patient has new onset pain of the right shoulder, this started about 3 to 4 weeks ago  This is not related to any particular injury  He does have full range of motion has mild tenderness over bicep tendon  He does agree to referral to physical therapy for consultation  He was advised of supportive care including gentle range of motion exercises and stretches, heat or ice, over-the-counter analgesics

## 2021-04-14 NOTE — ASSESSMENT & PLAN NOTE
Patient requesting updated referral to dermatology, sees dermatology on a regular basis for overall skin checks  New referral has been placed per his request

## 2021-04-15 ENCOUNTER — HOSPITAL ENCOUNTER (OUTPATIENT)
Dept: LAB | Facility: MEDICAL CENTER | Age: 56
End: 2021-04-15
Attending: NURSE PRACTITIONER
Payer: OTHER GOVERNMENT

## 2021-04-15 DIAGNOSIS — R39.9 LOWER URINARY TRACT SYMPTOMS (LUTS): ICD-10-CM

## 2021-04-15 DIAGNOSIS — I10 ESSENTIAL HYPERTENSION: ICD-10-CM

## 2021-04-15 DIAGNOSIS — E78.5 HYPERLIPIDEMIA, UNSPECIFIED HYPERLIPIDEMIA TYPE: ICD-10-CM

## 2021-04-15 PROCEDURE — 85025 COMPLETE CBC W/AUTO DIFF WBC: CPT

## 2021-04-15 PROCEDURE — 80061 LIPID PANEL: CPT

## 2021-04-15 PROCEDURE — 80053 COMPREHEN METABOLIC PANEL: CPT

## 2021-04-15 PROCEDURE — 84153 ASSAY OF PSA TOTAL: CPT

## 2021-04-15 PROCEDURE — 36415 COLL VENOUS BLD VENIPUNCTURE: CPT

## 2021-04-15 PROCEDURE — 81003 URINALYSIS AUTO W/O SCOPE: CPT

## 2021-04-16 LAB
ALBUMIN SERPL BCP-MCNC: 4.1 G/DL (ref 3.2–4.9)
ALBUMIN/GLOB SERPL: 1.4 G/DL
ALP SERPL-CCNC: 126 U/L (ref 30–99)
ALT SERPL-CCNC: 29 U/L (ref 2–50)
ANION GAP SERPL CALC-SCNC: 6 MMOL/L (ref 7–16)
APPEARANCE UR: CLEAR
AST SERPL-CCNC: 33 U/L (ref 12–45)
BASOPHILS # BLD AUTO: 0.6 % (ref 0–1.8)
BASOPHILS # BLD: 0.03 K/UL (ref 0–0.12)
BILIRUB SERPL-MCNC: 0.3 MG/DL (ref 0.1–1.5)
BILIRUB UR QL STRIP.AUTO: NEGATIVE
BUN SERPL-MCNC: 20 MG/DL (ref 8–22)
CALCIUM SERPL-MCNC: 8.8 MG/DL (ref 8.5–10.5)
CHLORIDE SERPL-SCNC: 105 MMOL/L (ref 96–112)
CHOLEST SERPL-MCNC: 129 MG/DL (ref 100–199)
CO2 SERPL-SCNC: 25 MMOL/L (ref 20–33)
COLOR UR: YELLOW
CREAT SERPL-MCNC: 0.87 MG/DL (ref 0.5–1.4)
EOSINOPHIL # BLD AUTO: 0.09 K/UL (ref 0–0.51)
EOSINOPHIL NFR BLD: 1.9 % (ref 0–6.9)
ERYTHROCYTE [DISTWIDTH] IN BLOOD BY AUTOMATED COUNT: 44.9 FL (ref 35.9–50)
FASTING STATUS PATIENT QL REPORTED: NORMAL
GLOBULIN SER CALC-MCNC: 3 G/DL (ref 1.9–3.5)
GLUCOSE SERPL-MCNC: 88 MG/DL (ref 65–99)
GLUCOSE UR STRIP.AUTO-MCNC: NEGATIVE MG/DL
HCT VFR BLD AUTO: 45.5 % (ref 42–52)
HDLC SERPL-MCNC: 31 MG/DL
HGB BLD-MCNC: 15.5 G/DL (ref 14–18)
IMM GRANULOCYTES # BLD AUTO: 0.01 K/UL (ref 0–0.11)
IMM GRANULOCYTES NFR BLD AUTO: 0.2 % (ref 0–0.9)
KETONES UR STRIP.AUTO-MCNC: NEGATIVE MG/DL
LDLC SERPL CALC-MCNC: 74 MG/DL
LEUKOCYTE ESTERASE UR QL STRIP.AUTO: NEGATIVE
LYMPHOCYTES # BLD AUTO: 1.6 K/UL (ref 1–4.8)
LYMPHOCYTES NFR BLD: 33.8 % (ref 22–41)
MCH RBC QN AUTO: 32 PG (ref 27–33)
MCHC RBC AUTO-ENTMCNC: 34.1 G/DL (ref 33.7–35.3)
MCV RBC AUTO: 93.8 FL (ref 81.4–97.8)
MICRO URNS: NORMAL
MONOCYTES # BLD AUTO: 0.48 K/UL (ref 0–0.85)
MONOCYTES NFR BLD AUTO: 10.1 % (ref 0–13.4)
NEUTROPHILS # BLD AUTO: 2.52 K/UL (ref 1.82–7.42)
NEUTROPHILS NFR BLD: 53.4 % (ref 44–72)
NITRITE UR QL STRIP.AUTO: NEGATIVE
NRBC # BLD AUTO: 0 K/UL
NRBC BLD-RTO: 0 /100 WBC
PH UR STRIP.AUTO: 6.5 [PH] (ref 5–8)
PLATELET # BLD AUTO: 242 K/UL (ref 164–446)
PMV BLD AUTO: 10.1 FL (ref 9–12.9)
POTASSIUM SERPL-SCNC: 4.2 MMOL/L (ref 3.6–5.5)
PROT SERPL-MCNC: 7.1 G/DL (ref 6–8.2)
PROT UR QL STRIP: NEGATIVE MG/DL
PSA SERPL-MCNC: 0.27 NG/ML (ref 0–4)
RBC # BLD AUTO: 4.85 M/UL (ref 4.7–6.1)
RBC UR QL AUTO: NEGATIVE
SODIUM SERPL-SCNC: 136 MMOL/L (ref 135–145)
SP GR UR STRIP.AUTO: 1.02
TRIGL SERPL-MCNC: 122 MG/DL (ref 0–149)
UROBILINOGEN UR STRIP.AUTO-MCNC: 0.2 MG/DL
WBC # BLD AUTO: 4.7 K/UL (ref 4.8–10.8)

## 2021-05-06 ENCOUNTER — TELEMEDICINE (OUTPATIENT)
Dept: BEHAVIORAL HEALTH | Facility: CLINIC | Age: 56
End: 2021-05-06
Payer: OTHER GOVERNMENT

## 2021-05-06 DIAGNOSIS — F32.0 CURRENT MILD EPISODE OF MAJOR DEPRESSIVE DISORDER WITHOUT PRIOR EPISODE (HCC): ICD-10-CM

## 2021-05-06 PROCEDURE — 90834 PSYTX W PT 45 MINUTES: CPT | Mod: 95 | Performed by: MARRIAGE & FAMILY THERAPIST
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
## 2021-08-13 ENCOUNTER — PATIENT MESSAGE (OUTPATIENT)
Dept: MEDICAL GROUP | Facility: PHYSICIAN GROUP | Age: 56
End: 2021-08-13

## 2021-08-13 DIAGNOSIS — F33.1 MODERATE EPISODE OF RECURRENT MAJOR DEPRESSIVE DISORDER (HCC): ICD-10-CM

## 2021-08-13 DIAGNOSIS — F41.9 ANXIETY: ICD-10-CM

## 2021-08-13 DIAGNOSIS — E78.5 HYPERLIPIDEMIA, UNSPECIFIED HYPERLIPIDEMIA TYPE: ICD-10-CM

## 2021-08-13 DIAGNOSIS — I10 ESSENTIAL HYPERTENSION: ICD-10-CM

## 2021-08-13 RX ORDER — ROSUVASTATIN CALCIUM 10 MG/1
10 TABLET, COATED ORAL EVERY EVENING
Qty: 90 TABLET | Refills: 3 | Status: SHIPPED | OUTPATIENT
Start: 2021-08-13 | End: 2022-05-12 | Stop reason: SDUPTHER

## 2021-08-13 RX ORDER — PAROXETINE HYDROCHLORIDE 40 MG/1
40 TABLET, FILM COATED ORAL DAILY
Qty: 90 TABLET | Refills: 3 | Status: SHIPPED | OUTPATIENT
Start: 2021-08-13 | End: 2022-05-12 | Stop reason: SDUPTHER

## 2021-08-13 RX ORDER — LISINOPRIL AND HYDROCHLOROTHIAZIDE 25; 20 MG/1; MG/1
1 TABLET ORAL
Qty: 90 TABLET | Refills: 3 | Status: SHIPPED | OUTPATIENT
Start: 2021-08-13 | End: 2022-05-12 | Stop reason: SDUPTHER

## 2021-08-13 NOTE — PATIENT COMMUNICATION
Received request via: Patient    Was the patient seen in the last year in this department? Yes    Does the patient have an active prescription (recently filled or refills available) for medication(s) requested? Yes, Patient requesting prescriptions sent to [facility]

## 2021-08-16 NOTE — TELEPHONE ENCOUNTER
----- Message from Dwight Linares sent at 10/30/2017  5:00 AM PDT -----  Regarding: Lab results  Dwight,  I have reviewed your labs, they all look pretty stable/normal. First set of testosterone labs were within normal limits.  Geetha  
I sent the below info to patient via a Pixia message a week ago--pt still has not read message. Please call pt with message. Thank you.    
Patient received my chart message   
DR Yañez [phone]

## 2021-08-20 DIAGNOSIS — N52.9 ERECTILE DYSFUNCTION, UNSPECIFIED ERECTILE DYSFUNCTION TYPE: ICD-10-CM

## 2021-10-20 ENCOUNTER — PATIENT MESSAGE (OUTPATIENT)
Dept: MEDICAL GROUP | Facility: PHYSICIAN GROUP | Age: 56
End: 2021-10-20

## 2021-10-20 DIAGNOSIS — N52.9 ERECTILE DYSFUNCTION, UNSPECIFIED ERECTILE DYSFUNCTION TYPE: ICD-10-CM

## 2021-10-20 RX ORDER — SILDENAFIL 50 MG/1
50 TABLET, FILM COATED ORAL PRN
Qty: 5 TABLET | Refills: 3 | Status: CANCELLED | OUTPATIENT
Start: 2021-10-20

## 2021-10-21 RX ORDER — SILDENAFIL 50 MG/1
50 TABLET, FILM COATED ORAL PRN
Qty: 5 TABLET | Refills: 3 | Status: SHIPPED | OUTPATIENT
Start: 2021-10-21 | End: 2022-11-15

## 2022-05-11 ENCOUNTER — OFFICE VISIT (OUTPATIENT)
Dept: MEDICAL GROUP | Facility: PHYSICIAN GROUP | Age: 57
End: 2022-05-11
Payer: OTHER GOVERNMENT

## 2022-05-11 VITALS
SYSTOLIC BLOOD PRESSURE: 118 MMHG | TEMPERATURE: 97.9 F | DIASTOLIC BLOOD PRESSURE: 62 MMHG | WEIGHT: 275 LBS | OXYGEN SATURATION: 95 % | HEART RATE: 95 BPM | BODY MASS INDEX: 33.49 KG/M2 | HEIGHT: 76 IN

## 2022-05-11 DIAGNOSIS — Z12.5 PROSTATE CANCER SCREENING: ICD-10-CM

## 2022-05-11 DIAGNOSIS — F41.9 ANXIETY: ICD-10-CM

## 2022-05-11 DIAGNOSIS — Z12.11 COLON CANCER SCREENING: ICD-10-CM

## 2022-05-11 DIAGNOSIS — I10 PRIMARY HYPERTENSION: ICD-10-CM

## 2022-05-11 DIAGNOSIS — Z11.59 NEED FOR HEPATITIS C SCREENING TEST: ICD-10-CM

## 2022-05-11 DIAGNOSIS — E78.5 HYPERLIPIDEMIA, UNSPECIFIED HYPERLIPIDEMIA TYPE: ICD-10-CM

## 2022-05-11 PROCEDURE — 99214 OFFICE O/P EST MOD 30 MIN: CPT | Performed by: NURSE PRACTITIONER

## 2022-05-11 RX ORDER — BETAMETHASONE DIPROPIONATE 0.5 MG/G
CREAM TOPICAL
COMMUNITY
Start: 2022-02-11

## 2022-05-11 RX ORDER — CLINDAMYCIN PHOSPHATE 10 UG/ML
LOTION TOPICAL
COMMUNITY
Start: 2022-02-11 | End: 2023-03-09

## 2022-05-11 ASSESSMENT — FIBROSIS 4 INDEX: FIB4 SCORE: 1.44

## 2022-05-11 ASSESSMENT — PATIENT HEALTH QUESTIONNAIRE - PHQ9: CLINICAL INTERPRETATION OF PHQ2 SCORE: 0

## 2022-05-11 NOTE — PROGRESS NOTES
Subjective:     CC:    Chief Complaint   Patient presents with   • Establish Care   • Hypertension        HISTORY OF THE PRESENT ILLNESS: Patient is a 56 y.o. male, here today to establish care. Prior PCP was SUSANNAH Meza. The below problems were discussed/reviewed at this visit:    Problem   Anxiety    Taking Paxil 40mg QD since 2019     Erectile Dysfunction    Able to get erection, problem with sustaining and low libido; over 10 years now; States he saw urology at [place]; was prescribed Viagra and never took.  He is also on paxil for Anxiety, we briefly discussed switching to wellbutrin, he is getting new referral today to psych for med management     Hypertension    Taking Lisinopril-HCTZ 20-25mg QD     Hyperlipidemia    Taking Rosuvastatin 10mg QD          Patient Active Problem List   Diagnosis   • Hypertension   • Skin lesion   • Chronic low back pain   • Hyperlipidemia   • Environmental allergies   • Obesity (BMI 30-39.9)   • Eczema   • Family history of prostate cancer in father   • Erectile dysfunction   • Toenail fungus   • Elevated liver function tests   • Cough   • Rectal lump   • Cerumen debris on tympanic membrane of both ears   • Anxiety   • Major depressive disorder   • Elevated fasting glucose   • Problems in relationship with spouse or partner   • Grief counseling   • Acute pain of right shoulder   • Lower urinary tract symptoms (LUTS)       Past Medical History:   Diagnosis Date   • Anxiety    • Depression    • Hyperlipidemia    • Hypertension 10/14/2014        Current Outpatient Medications Ordered in Epic   Medication Sig Dispense Refill   • rosuvastatin (CRESTOR) 10 MG Tab Take 1 Tablet by mouth every evening. 90 Tablet 3   • paroxetine (PAXIL) 40 MG tablet Take 1 Tablet by mouth every day. 90 Tablet 3   • lisinopril-hydrochlorothiazide (PRINZIDE) 20-25 MG per tablet Take 1 Tablet by mouth every day. 90 Tablet 3   • CLINDAMYCIN PHOSPHATE,TOPICAL, 1 % Lotion APPLY TO SCALP TWICE DAILY     • Aug  "Betamethasone Dipropionate (DIPROLENE-AF) 0.05 % Cream      • sildenafil citrate (VIAGRA) 50 MG tablet Take 1 Tablet by mouth as needed for Erectile Dysfunction. 5 Tablet 3     No current Epic-ordered facility-administered medications on file.        Past Surgical History:   Procedure Laterality Date   • DENTAL SURGERY          Allergies:  Patient has no known allergies.    Health Maintenance: Completed    ROS:   Review of Systems   Constitutional: Negative.  Negative for fever and malaise/fatigue.   HENT: Negative.    Eyes: Negative.    Respiratory: Negative for cough, sputum production and shortness of breath.    Cardiovascular: Negative for chest pain, palpitations and leg swelling.   Gastrointestinal: Negative.    Genitourinary: Negative.    Musculoskeletal: Negative.    Neurological: Negative.    Endo/Heme/Allergies: Negative.    Psychiatric/Behavioral: Negative.          Objective:     Exam: /62 (BP Location: Left arm, Patient Position: Sitting, BP Cuff Size: Adult)   Pulse 95   Temp 36.6 °C (97.9 °F) (Temporal)   Ht 1.918 m (6' 3.5\")   Wt 125 kg (275 lb)   SpO2 95%  Body mass index is 33.92 kg/m².    Physical Exam  Constitutional:       Appearance: Normal appearance.   Cardiovascular:      Rate and Rhythm: Normal rate and regular rhythm.      Pulses: Normal pulses.      Heart sounds: Normal heart sounds.   Pulmonary:      Effort: Pulmonary effort is normal.      Breath sounds: Normal breath sounds.   Musculoskeletal:         General: Normal range of motion.      Cervical back: Normal range of motion and neck supple.      Right lower leg: No edema.      Left lower leg: No edema.   Skin:     General: Skin is warm and dry.   Neurological:      General: No focal deficit present.      Mental Status: He is alert and oriented to person, place, and time.   Psychiatric:         Mood and Affect: Mood normal.         Behavior: Behavior normal.         Thought Content: Thought content normal.         Judgment: " Judgment normal.       Labs: reviewed with patient labs from     Assessment & Plan:   56 y.o. male with the following -    Problem List Items Addressed This Visit     Hypertension     BP in clinic today 118/62; no CP, dizziness, palpitations  - continue Lisinopril-HCTZ 20-25mg QD  - annual CMP ordered today           Relevant Medications    rosuvastatin (CRESTOR) 10 MG Tab    lisinopril-hydrochlorothiazide (PRINZIDE) 20-25 MG per tablet    Other Relevant Orders    Comp Metabolic Panel    Hyperlipidemia     LDL <100 in 2021; no myalgia  - continue Rosuvastatin 10mg QD  - annual fasting lipid ordered today  - heart healthy eating, stay active           Relevant Medications    rosuvastatin (CRESTOR) 10 MG Tab    lisinopril-hydrochlorothiazide (PRINZIDE) 20-25 MG per tablet    Other Relevant Orders    Lipid Profile    Anxiety     States mood is doing good on current dosing; he has chronic problem with sustaining erection and low libido; we discussed switching to wellbutrin, referral submitted today to establish with psychiatry for med management  - continue Paxil 40mg QD till he sees psych           Relevant Medications    paroxetine (PAXIL) 40 MG tablet    Other Relevant Orders    Referral to Psychiatry      Other Visit Diagnoses     Prostate cancer screening        Relevant Orders    PROSTATE SPECIFIC AG SCREENING    Colon cancer screening        Relevant Orders    Referral to GI for Colonoscopy    Need for hepatitis C screening test        Relevant Orders    HCV Scrn ( [id number] [employer])        Medications Prescribed Today:  1. Anxiety  - paroxetine (PAXIL) 40 MG tablet; Take 1 Tablet by mouth every day.  Dispense: 90 Tablet; Refill: 3    2. Primary hypertension  - lisinopril-hydrochlorothiazide (PRINZIDE) 20-25 MG per tablet; Take 1 Tablet by mouth every day.  Dispense: 90 Tablet; Refill: 3    3. Hyperlipidemia, unspecified hyperlipidemia type  - rosuvastatin (CRESTOR) 10 MG Tab; Take 1 Tablet by mouth every  evening.  Dispense: 90 Tablet; Refill: 3    Educated in proper administration of medication(s) ordered today including safety, possible SE, risks, benefits, rationale and alternatives to therapy.     Return in about 3 months (around 8/11/2022) for review labs/psych consult.    Please note that this dictation was created using voice recognition software. I have made every reasonable attempt to correct obvious errors, but I expect that there are errors of grammar and possibly content that I did not discover before finalizing the note.

## 2022-05-12 RX ORDER — PAROXETINE HYDROCHLORIDE 40 MG/1
40 TABLET, FILM COATED ORAL DAILY
Qty: 90 TABLET | Refills: 3 | Status: SHIPPED | OUTPATIENT
Start: 2022-05-12 | End: 2023-03-10

## 2022-05-12 RX ORDER — ROSUVASTATIN CALCIUM 10 MG/1
10 TABLET, COATED ORAL EVERY EVENING
Qty: 90 TABLET | Refills: 3 | Status: SHIPPED | OUTPATIENT
Start: 2022-05-12 | End: 2022-08-19

## 2022-05-12 RX ORDER — LISINOPRIL AND HYDROCHLOROTHIAZIDE 25; 20 MG/1; MG/1
1 TABLET ORAL
Qty: 90 TABLET | Refills: 3 | Status: SHIPPED | OUTPATIENT
Start: 2022-05-12 | End: 2023-03-09 | Stop reason: SDUPTHER

## 2022-05-12 ASSESSMENT — ENCOUNTER SYMPTOMS
SHORTNESS OF BREATH: 0
PALPITATIONS: 0
SPUTUM PRODUCTION: 0
NEUROLOGICAL NEGATIVE: 1
FEVER: 0
EYES NEGATIVE: 1
GASTROINTESTINAL NEGATIVE: 1
CONSTITUTIONAL NEGATIVE: 1
MUSCULOSKELETAL NEGATIVE: 1
COUGH: 0
PSYCHIATRIC NEGATIVE: 1

## 2022-05-12 NOTE — ASSESSMENT & PLAN NOTE
LDL <100 in 4/2021; no myalgia  - continue Rosuvastatin 10mg QD  - annual fasting lipid ordered today  - heart healthy eating, stay active

## 2022-05-12 NOTE — ASSESSMENT & PLAN NOTE
States mood is doing good on current dosing; he has chronic problem with sustaining erection and low libido; we discussed switching to wellbutrin, referral submitted today to establish with psychiatry for med management  - continue Paxil 40mg QD till he sees psych

## 2022-05-12 NOTE — ASSESSMENT & PLAN NOTE
BP in clinic today 118/62; no CP, dizziness, palpitations  - continue Lisinopril-HCTZ 20-25mg QD  - annual CMP ordered today

## 2022-07-07 ENCOUNTER — HOSPITAL ENCOUNTER (OUTPATIENT)
Dept: LAB | Facility: MEDICAL CENTER | Age: 57
End: 2022-07-07
Attending: NURSE PRACTITIONER
Payer: OTHER GOVERNMENT

## 2022-07-07 DIAGNOSIS — Z12.5 PROSTATE CANCER SCREENING: ICD-10-CM

## 2022-07-07 DIAGNOSIS — E78.5 HYPERLIPIDEMIA, UNSPECIFIED HYPERLIPIDEMIA TYPE: ICD-10-CM

## 2022-07-07 DIAGNOSIS — Z11.59 NEED FOR HEPATITIS C SCREENING TEST: ICD-10-CM

## 2022-07-07 DIAGNOSIS — I10 PRIMARY HYPERTENSION: ICD-10-CM

## 2022-07-07 LAB
ALBUMIN SERPL BCP-MCNC: 4.3 G/DL (ref 3.2–4.9)
ALBUMIN/GLOB SERPL: 1.4 G/DL
ALP SERPL-CCNC: 120 U/L (ref 30–99)
ALT SERPL-CCNC: 23 U/L (ref 2–50)
ANION GAP SERPL CALC-SCNC: 12 MMOL/L (ref 7–16)
AST SERPL-CCNC: 28 U/L (ref 12–45)
BILIRUB SERPL-MCNC: 0.3 MG/DL (ref 0.1–1.5)
BUN SERPL-MCNC: 23 MG/DL (ref 8–22)
CALCIUM SERPL-MCNC: 9.1 MG/DL (ref 8.5–10.5)
CHLORIDE SERPL-SCNC: 100 MMOL/L (ref 96–112)
CHOLEST SERPL-MCNC: 125 MG/DL (ref 100–199)
CO2 SERPL-SCNC: 23 MMOL/L (ref 20–33)
CREAT SERPL-MCNC: 0.88 MG/DL (ref 0.5–1.4)
FASTING STATUS PATIENT QL REPORTED: NORMAL
GFR SERPLBLD CREATININE-BSD FMLA CKD-EPI: 100 ML/MIN/1.73 M 2
GLOBULIN SER CALC-MCNC: 3 G/DL (ref 1.9–3.5)
GLUCOSE SERPL-MCNC: 95 MG/DL (ref 65–99)
HCV AB SER QL: NORMAL
HDLC SERPL-MCNC: 37 MG/DL
LDLC SERPL CALC-MCNC: 72 MG/DL
POTASSIUM SERPL-SCNC: 4.2 MMOL/L (ref 3.6–5.5)
PROT SERPL-MCNC: 7.3 G/DL (ref 6–8.2)
PSA SERPL-MCNC: 0.25 NG/ML (ref 0–4)
SODIUM SERPL-SCNC: 135 MMOL/L (ref 135–145)
TRIGL SERPL-MCNC: 81 MG/DL (ref 0–149)

## 2022-07-07 PROCEDURE — G0472 HEP C SCREEN HIGH RISK/OTHER: HCPCS

## 2022-07-07 PROCEDURE — 84153 ASSAY OF PSA TOTAL: CPT

## 2022-07-07 PROCEDURE — 80061 LIPID PANEL: CPT

## 2022-07-07 PROCEDURE — 36415 COLL VENOUS BLD VENIPUNCTURE: CPT

## 2022-07-07 PROCEDURE — 80053 COMPREHEN METABOLIC PANEL: CPT

## 2022-08-19 ENCOUNTER — OFFICE VISIT (OUTPATIENT)
Dept: MEDICAL GROUP | Facility: PHYSICIAN GROUP | Age: 57
End: 2022-08-19
Payer: OTHER GOVERNMENT

## 2022-08-19 VITALS
HEIGHT: 76 IN | OXYGEN SATURATION: 97 % | TEMPERATURE: 98.7 F | BODY MASS INDEX: 33.73 KG/M2 | SYSTOLIC BLOOD PRESSURE: 106 MMHG | DIASTOLIC BLOOD PRESSURE: 60 MMHG | WEIGHT: 277 LBS | HEART RATE: 82 BPM

## 2022-08-19 DIAGNOSIS — I10 PRIMARY HYPERTENSION: ICD-10-CM

## 2022-08-19 DIAGNOSIS — B35.1 TOENAIL FUNGUS: ICD-10-CM

## 2022-08-19 DIAGNOSIS — F41.9 ANXIETY: ICD-10-CM

## 2022-08-19 DIAGNOSIS — E78.5 HYPERLIPIDEMIA, UNSPECIFIED HYPERLIPIDEMIA TYPE: ICD-10-CM

## 2022-08-19 DIAGNOSIS — R79.89 ELEVATED LIVER FUNCTION TESTS: ICD-10-CM

## 2022-08-19 PROCEDURE — 99214 OFFICE O/P EST MOD 30 MIN: CPT | Performed by: NURSE PRACTITIONER

## 2022-08-19 RX ORDER — ROSUVASTATIN CALCIUM 5 MG/1
5 TABLET, COATED ORAL EVERY EVENING
Qty: 90 TABLET | Refills: 3 | Status: SHIPPED | OUTPATIENT
Start: 2022-08-19 | End: 2023-07-27

## 2022-08-19 ASSESSMENT — ENCOUNTER SYMPTOMS
PALPITATIONS: 0
CONSTITUTIONAL NEGATIVE: 1
EYES NEGATIVE: 1
SPUTUM PRODUCTION: 0
COUGH: 0
NEUROLOGICAL NEGATIVE: 1
PSYCHIATRIC NEGATIVE: 1
SHORTNESS OF BREATH: 0
GASTROINTESTINAL NEGATIVE: 1
MUSCULOSKELETAL NEGATIVE: 1
FEVER: 0

## 2022-08-19 ASSESSMENT — FIBROSIS 4 INDEX: FIB4 SCORE: 1.38

## 2022-08-19 NOTE — ASSESSMENT & PLAN NOTE
States mood is doing good on current dosing; he has chronic problem with sustaining erection and low libido; we discussed switching to wellbutrin, referral submitted at last visit to establish with psychiatry for med management; he has appointment next month 9/2022  - continue Paxil 40mg QD till he sees psych

## 2022-08-19 NOTE — ASSESSMENT & PLAN NOTE
Latest Reference Range & Units 7/7/22 07:37   Cholesterol,Tot 100 - 199 mg/dL 125   Triglycerides 0 - 149 mg/dL 81   HDL >=40 mg/dL 37 !   LDL <100 mg/dL 72     - ALP remains elevated, although improving from last year  - he is watching intake of fried/fatty foods  - reduce rosuvastatin to 5mg; he just got refill on 10mg and will take every other day  - recheck CMP/fasting lipid in 6 months

## 2022-08-19 NOTE — PROGRESS NOTES
Subjective:     CC:   Chief Complaint   Patient presents with    Lab Results    Hypertension        HPI:   Patient is a 56 y.o. male here today to review lab results    Problem   Anxiety    Taking Paxil 40mg QD since 2019     Toenail Fungus    Chronic fungal infection toe nails; podiatrist at [facility] recently retired; pt requesting referral to establish with new podiatrist     Elevated Liver Function Tests     Latest Reference Range & Units 10/31/20 07:40 4/15/21 10:20 7/7/22 07:37   AST(SGOT) 12 - 45 U/L 33 33 28   ALT(SGPT) 2 - 50 U/L 32 29 23   Alkaline Phosphatase 30 - 99 U/L 136 (H) 126 (H) 120 (H)   Total Bilirubin 0.1 - 1.5 mg/dL 0.3 0.3 0.3        Hypertension    Taking Lisinopril-HCTZ 20-25mg QD     Hyperlipidemia    Taking Rosuvastatin 10mg QD         Patient Active Problem List   Diagnosis    Hypertension    Skin lesion    Chronic low back pain    Hyperlipidemia    Environmental allergies    Obesity (BMI 30-39.9)    Eczema    Family history of prostate cancer in father    Erectile dysfunction    Toenail fungus    Elevated liver function tests    Cough    Rectal lump    Cerumen debris on tympanic membrane of both ears    Anxiety    Major depressive disorder    Elevated fasting glucose    Problems in relationship with spouse or partner    Grief counseling    Acute pain of right shoulder    Lower urinary tract symptoms (LUTS)       Past Medical History:   Diagnosis Date    Anxiety     Depression     Hyperlipidemia     Hypertension 10/14/2014        Past Surgical History:   Procedure Laterality Date    DENTAL SURGERY          Current Outpatient Medications on File Prior to Visit   Medication Sig Dispense Refill    paroxetine (PAXIL) 40 MG tablet Take 1 Tablet by mouth every day. 90 Tablet 3    lisinopril-hydrochlorothiazide (PRINZIDE) 20-25 MG per tablet Take 1 Tablet by mouth every day. 90 Tablet 3    CLINDAMYCIN PHOSPHATE,TOPICAL, 1 % Lotion APPLY TO SCALP TWICE DAILY      Aug Betamethasone Dipropionate  "(DIPROLENE-AF) 0.05 % Cream       sildenafil citrate (VIAGRA) 50 MG tablet Take 1 Tablet by mouth as needed for Erectile Dysfunction. 5 Tablet 3     No current facility-administered medications on file prior to visit.      Health Maintenance: Completed  - saw GI last week; had colonoscopy, diverticulosis; eat more fiber; recall in 7 years    ROS:  Review of Systems   Constitutional: Negative.  Negative for fever and malaise/fatigue.   HENT: Negative.     Eyes: Negative.    Respiratory:  Negative for cough, sputum production and shortness of breath.    Cardiovascular:  Negative for chest pain, palpitations and leg swelling.   Gastrointestinal: Negative.    Genitourinary: Negative.    Musculoskeletal: Negative.    Neurological: Negative.    Endo/Heme/Allergies: Negative.    Psychiatric/Behavioral: Negative.       Objective:     Exam:  /60   Pulse 82   Temp 37.1 °C (98.7 °F) (Temporal)   Ht 1.918 m (6' 3.5\")   Wt (!) 126 kg (277 lb)   SpO2 97%   BMI 34.17 kg/m²  Body mass index is 34.17 kg/m².    Physical Exam    Labs: reviewed with patient   Latest Reference Range & Units 7/7/22 07:37   Sodium 135 - 145 mmol/L 135   Potassium 3.6 - 5.5 mmol/L 4.2   Chloride 96 - 112 mmol/L 100   Co2 20 - 33 mmol/L 23   Anion Gap 7.0 - 16.0  12.0   Glucose 65 - 99 mg/dL 95   Bun 8 - 22 mg/dL 23 (H)   Creatinine 0.50 - 1.40 mg/dL 0.88   GFR (CKD-EPI) >60 mL/min/1.73 m 2 100   Calcium 8.5 - 10.5 mg/dL 9.1   AST(SGOT) 12 - 45 U/L 28   ALT(SGPT) 2 - 50 U/L 23   Alkaline Phosphatase 30 - 99 U/L 120 (H)   Total Bilirubin 0.1 - 1.5 mg/dL 0.3   Albumin 3.2 - 4.9 g/dL 4.3   Total Protein 6.0 - 8.2 g/dL 7.3   Globulin 1.9 - 3.5 g/dL 3.0   A-G Ratio g/dL 1.4   Fasting Status  Fasting   Cholesterol,Tot 100 - 199 mg/dL 125   Triglycerides 0 - 149 mg/dL 81   HDL >=40 mg/dL 37 !   LDL <100 mg/dL 72     Assessment & Plan:     56 y.o. male with the following -     Problem List Items Addressed This Visit       Hypertension     BP in clinic " today 106/60; no CP, dizziness, palpitations  - continue Lisinopril-HCTZ 20-25mg QD  - he will get home BP cuff & keep weekly log; we plan to start reducing BP meds as he gets active & BP gets lower  - reviewed CMP results; slightly elevated BUN, he is hydrating; ALP improving, still elevated; we will reduce rosuvastatin & recheck in 6 months         Relevant Medications    rosuvastatin (CRESTOR) 5 MG Tab    Hyperlipidemia      Latest Reference Range & Units 7/7/22 07:37   Cholesterol,Tot 100 - 199 mg/dL 125   Triglycerides 0 - 149 mg/dL 81   HDL >=40 mg/dL 37 !   LDL <100 mg/dL 72   - ALP remains elevated, although improving from last year  - he is watching intake of fried/fatty foods  - reduce rosuvastatin to 5mg; he just got refill on 10mg and will take every other day  - recheck CMP/fasting lipid in 6 months           Relevant Medications    rosuvastatin (CRESTOR) 5 MG Tab    Other Relevant Orders    Lipid Profile    Toenail fungus    Relevant Orders    Referral to Podiatry    Elevated liver function tests     ALP elevated, improving over the past few years  Lipid panel is WNL  We will try reduced dosing of rosuvastatin  Recheck fasting lipid in 6 months         Relevant Orders    Comp Metabolic Panel    Anxiety     States mood is doing good on current dosing; he has chronic problem with sustaining erection and low libido; we discussed switching to wellbutrin, referral submitted at last visit to establish with psychiatry for med management; he has appointment next month 9/2022  - continue Paxil 40mg QD till he sees psych            Medications Prescribed Today:  2. Hyperlipidemia, unspecified hyperlipidemia type  - rosuvastatin (CRESTOR) 5 MG Tab; Take 1 Tablet by mouth every evening.  Dispense: 90 Tablet; Refill: 3    Educated in proper administration of medication(s) ordered today including safety, possible SE, risks, benefits, rationale and alternatives to therapy.     Return in about 6 months (around  2/19/2023) for HTN, labs.    Please note that this dictation was created using voice recognition software. I have made every reasonable attempt to correct obvious errors, but I expect that there are errors of grammar and possibly content that I did not discover before finalizing the note.

## 2022-08-19 NOTE — ASSESSMENT & PLAN NOTE
BP in clinic today 106/60; no CP, dizziness, palpitations  - continue Lisinopril-HCTZ 20-25mg QD  - he will get home BP cuff & keep weekly log; we plan to start reducing BP meds as he gets active & BP gets lower  - reviewed CMP results; slightly elevated BUN, he is hydrating; ALP improving, still elevated; we will reduce rosuvastatin & recheck in 6 months

## 2022-08-19 NOTE — ASSESSMENT & PLAN NOTE
ALP elevated, improving over the past few years  Lipid panel is WNL  We will try reduced dosing of rosuvastatin  Recheck fasting lipid in 6 months

## 2022-09-28 ENCOUNTER — PATIENT MESSAGE (OUTPATIENT)
Dept: MEDICAL GROUP | Facility: PHYSICIAN GROUP | Age: 57
End: 2022-09-28
Payer: OTHER GOVERNMENT

## 2022-09-28 DIAGNOSIS — N52.9 ERECTILE DYSFUNCTION, UNSPECIFIED ERECTILE DYSFUNCTION TYPE: ICD-10-CM

## 2022-09-28 DIAGNOSIS — B35.1 TOENAIL FUNGUS: ICD-10-CM

## 2022-09-29 NOTE — PROGRESS NOTES
Erectile dysfunction, unspecified erectile dysfunction type  Referral for follow up appointment at [facility]  - Referral to Urology

## 2022-11-15 ENCOUNTER — OFFICE VISIT (OUTPATIENT)
Dept: BEHAVIORAL HEALTH | Facility: CLINIC | Age: 57
End: 2022-11-15
Payer: OTHER GOVERNMENT

## 2022-11-15 DIAGNOSIS — T88.7XXA SIDE EFFECT OF MEDICATION: ICD-10-CM

## 2022-11-15 DIAGNOSIS — F41.9 ANXIETY: ICD-10-CM

## 2022-11-15 PROCEDURE — 99204 OFFICE O/P NEW MOD 45 MIN: CPT | Mod: GC | Performed by: PSYCHIATRY & NEUROLOGY

## 2022-11-15 ASSESSMENT — ANXIETY QUESTIONNAIRES
6. BECOMING EASILY ANNOYED OR IRRITABLE: SEVERAL DAYS
7. FEELING AFRAID AS IF SOMETHING AWFUL MIGHT HAPPEN: MORE THAN HALF THE DAYS
3. WORRYING TOO MUCH ABOUT DIFFERENT THINGS: MORE THAN HALF THE DAYS
GAD7 TOTAL SCORE: 8
IF YOU CHECKED OFF ANY PROBLEMS ON THIS QUESTIONNAIRE, HOW DIFFICULT HAVE THESE PROBLEMS MADE IT FOR YOU TO DO YOUR WORK, TAKE CARE OF THINGS AT HOME, OR GET ALONG WITH OTHER PEOPLE: SOMEWHAT DIFFICULT
5. BEING SO RESTLESS THAT IT IS HARD TO SIT STILL: NOT AT ALL
4. TROUBLE RELAXING: NOT AT ALL
2. NOT BEING ABLE TO STOP OR CONTROL WORRYING: SEVERAL DAYS
1. FEELING NERVOUS, ANXIOUS, OR ON EDGE: MORE THAN HALF THE DAYS

## 2022-11-15 ASSESSMENT — PATIENT HEALTH QUESTIONNAIRE - PHQ9
5. POOR APPETITE OR OVEREATING: 2 - MORE THAN HALF THE DAYS
CLINICAL INTERPRETATION OF PHQ2 SCORE: 2
SUM OF ALL RESPONSES TO PHQ QUESTIONS 1-9: 10

## 2022-11-15 NOTE — PROGRESS NOTES
"  INITIAL PSYCHIATRIC EVALUATION      This provider informed the patient their medical records are totally confidential except for the use by other providers involved in their care, or if the patient signs a release, or to report instances of child or elder abuse, or if it is determined they are an immediate risk to harm themselves or others.      CHIEF COMPLAINT  \"I am not able to perform\"      HISTORY OF PRESENT ILLNESS  Dwight Linares is a 57 y.o. old male comes in today to establish care and for evaluation of anxiety, referred by PCP. Patient is new to the clinic.    Patient reports that in 2019 he saw his primary care provider for anxiety and some obsessive thoughts with subsequent compulsions.  Reports inability to leave the house without frequently checking the lock to make sure it is locked, also reports while driving and making turnaround coronaries will feel that he hit somebody and will [place] back repeatedly to make sure that he did not hit anybody, although nobody was there to begin with.  Reports that with the symptoms reported to his primary care doctor he was started on paroxetine and titrated to 40 mg in 2019.  Reports that since that time he did notice a reduction in his anxiety and an improvement in his ruminating thoughts and compulsive types of behavior, but had sexual side effects of decreased libido and erectile dysfunction.  This has been persistent since that time, nothing has improved or changed.  He has been prescribed Viagra in the past, but patient does not want to take a medication to fix a side effect of a different medication that he is on.  Voiced his concern to his primary care provider, who placed referral to behavioral health services, and this patient presents to office today.    Patient reports a history of anxiety that has been somewhat difficult for him.  He does feel nervous and finds himself worrying about different things and sometimes feels afraid of as if something awful " might happen, but this has been manageable, it does not impact his ability to work or impact other social relationships in his life.  Notes that his relationship with his wife is not as good as it could be, and his sexual dysfunction does contribute to that relationship not being as good as he thinks it could be.  Reports that his wife is leaving out of town tomorrow and will be gone until after the new year, he is open to changing dose of medication, as he has never made any changes to the dose of medication nor has he trialed other antianxiety type medications.    Patient denies concomitant depression or other psychiatric issues.  Patient also denies any previous psychiatric history where he has seen a psychiatric provider and been prescribed medications.      PSYCHIATRIC REVIEW OF SYSTEMS: denies depressive symptoms, denies manic symptoms, and denies psychotic symptoms including  /       MEDICAL REVIEW OF SYSTEMS:   Constitutional negative   Eyes negative   Ears/Nose/Mouth/Throat negative   Cardiovascular negative   Respiratory negative   Gastrointestinal negative   Genitourinary Erectile dysfunction   Muscular negative   Integumentary negative   Neurological negative   Endocrine negative   Hematologic/Lymphatic negative     CURRENT MEDICATIONS:  Current Outpatient Medications   Medication Sig Dispense Refill    rosuvastatin (CRESTOR) 5 MG Tab Take 1 Tablet by mouth every evening. 90 Tablet 3    paroxetine (PAXIL) 40 MG tablet Take 1 Tablet by mouth every day. 90 Tablet 3    lisinopril-hydrochlorothiazide (PRINZIDE) 20-25 MG per tablet Take 1 Tablet by mouth every day. 90 Tablet 3    CLINDAMYCIN PHOSPHATE,TOPICAL, 1 % Lotion APPLY TO SCALP TWICE DAILY      Aug Betamethasone Dipropionate (DIPROLENE-AF) 0.05 % Cream       sildenafil citrate (VIAGRA) 50 MG tablet Take 1 Tablet by mouth as needed for Erectile Dysfunction. 5 Tablet 3     No current facility-administered medications for this visit.        ALLERGIES:  Patient has no known allergies.    PAST PSYCHIATRIC HISTORY  Prior psychiatric hospitalization: No  Prior Self harm/suicide attempt: No  Prior Diagnosis: Anxiety, depression    PAST PSYCHIATRIC MEDICATIONS  Paroxetine 40 mg, effective in treating anxiety, experience significant sexual dysfunction (decreased libido, erectile dysfunction)    FAMILY HISTORY  Psychiatric diagnosis: Denies   History of suicide attempts: Denies  Substance abuse history: Denies    SUBSTANCE USE HISTORY:  ALCOHOL: Drinks 4-5 times a year  TOBACCO: Smokes a cigar occasionally  CANNABIS: No  OPIOIDS: No  PRESCRIPTION MEDICATIONS: No  OTHERS: Denies  History of inpatient/outpatient rehab treatment: No      SOCIAL HISTORY  Childhood: born in [place]  Education: High school graduate  in Special Education: No  Intellectual Disability: No  Employment: Served in the [employer] for 22 years, is now a   Relationship: Has been  6 times, this is the second time he has been  to his current wife, no children with current wife  Kids: Has 2 grown sons who live in [place]  Current living situation: Lives in [facility] with his wife  Current/past legal issues: None   History: 22 years in the [employer]  Spiritual/Worship affiliation: Did not discuss    MEDICAL HISTORY  Past Medical History:   Diagnosis Date    Anxiety     Depression     Hyperlipidemia     Hypertension 10/14/2014     Past Surgical History:   Procedure Laterality Date    DENTAL SURGERY           PHYSICAL EXAMINAION:  Vital signs: There were no vitals taken for this visit.  Musculoskeletal: Normal gait.   Abnormal movements: no    MENTAL STATUS EXAMINATION      General:   - Grooming and hygiene: Adequate and Casual,   - Apparent distress: no,   - Behavior: Calm  - Eye Contact:  Good,   - no psychomotor agitation or retardation    - Participation: Active verbal participation  Orientation: Alert and Fully Oriented to person, place and time  Mood:  Euthymic  Affect: Flexible and Congruent with content,  Thought Process: Logical and Goal-directed  Thought Content: Denies suicidal or homicidal ideations, intent or plan Within normal limits  Perception: Denies auditory or visual hallucinations. No delusions noted Within normal limits  Attention span and concentration: Intact   Speech:Rate within normal limits and Volume within normal limits  Language: Appropriate   Insight: Good  Judgment: Good  Recent and remote memory: No gross evidence of memory deficits      SCREENINGS:      11/21/2019     3:00 PM 3/4/2021    11:41 AM 5/11/2022     2:20 PM   Depression Screen (PHQ-2/PHQ-9)   PHQ-2 Total Score  0    PHQ-2 Total Score 4  0   PHQ-9 Total Score  2    PHQ-9 Total Score 10         Interpretation of PHQ-9 Total Score   Score Severity   1-4 No Depression   5-9 Mild Depression   10-14 Moderate Depression   15-19 Moderately Severe Depression   20-27 Severe Depression         View : No data to display.                      SAFETY ASSESSMENT - SELF:    Does patient acknowledge current or past symptoms of dangerousness to self? no  History of suicide by family member: no  History of suicide by friend/significant other: no  Recent change in amount/specificity/intensity of suicidal thoughts or self-harm behavior? no  Current access to firearms, medications, or other identified means of suicide/self-harm? no  If yes, willing to restrict access to means of suicide/self-harm? na  Protective factors present: yes       SAFETY ASSESSMENT - OTHERS:    Does patient acknowledge current or past symptoms of aggressive behavior or risk to others? no  Recent change in amount/specificity/intensity of thoughts or threats to harm others? no  Current access to firearms/other identified means of harm? no  If yes, willing to restrict access to weapons/means of harm? na       CURRENT RISK:       Suicidal: Low       Homicidal: Low       Self-Harm: Low       Relapse: Not applicable       Crisis  Safety Plan Reviewed Not Indicated    MEDICAL RECORDS/LABS/DIAGNOSTIC TESTS REVIEWED:  Lab Results   Component Value Date/Time    WBC 4.7 (L) 04/15/2021 10:20 AM    RBC 4.85 04/15/2021 10:20 AM    HEMOGLOBIN 15.5 04/15/2021 10:20 AM    HEMATOCRIT 45.5 04/15/2021 10:20 AM    MCV 93.8 04/15/2021 10:20 AM    MCH 32.0 04/15/2021 10:20 AM    MCHC 34.1 04/15/2021 10:20 AM    MPV 10.1 04/15/2021 10:20 AM    NEUTSPOLYS 53.40 04/15/2021 10:20 AM    LYMPHOCYTES 33.80 04/15/2021 10:20 AM    MONOCYTES 10.10 04/15/2021 10:20 AM    EOSINOPHILS 1.90 04/15/2021 10:20 AM    BASOPHILS 0.60 04/15/2021 10:20 AM       Lab Results   Component Value Date/Time    SODIUM 135 07/07/2022 07:37 AM    POTASSIUM 4.2 07/07/2022 07:37 AM    CHLORIDE 100 07/07/2022 07:37 AM    CO2 23 07/07/2022 07:37 AM    GLUCOSE 95 07/07/2022 07:37 AM    BUN 23 (H) 07/07/2022 07:37 AM    CREATININE 0.88 07/07/2022 07:37 AM       Lab Results   Component Value Date/Time    CHOLSTRLTOT 125 07/07/2022 07:37 AM    LDL 72 07/07/2022 07:37 AM    HDL 37 (A) 07/07/2022 07:37 AM    TRIGLYCERIDE 81 07/07/2022 07:37 AM       Lab Results   Component Value Date/Time    HBA1C 5.6 11/22/2019 12:16 PM       Lab Results   Component Value Date/Time    CHOLSTRLTOT 125 07/07/2022 07:37 AM    LDL 72 07/07/2022 07:37 AM    HDL 37 (A) 07/07/2022 07:37 AM    TRIGLYCERIDE 81 07/07/2022 07:37 AM       Lab Results   Component Value Date/Time    ALKPHOSPHAT 120 (H) 07/07/2022 07:37 AM    ASTSGOT 28 07/07/2022 07:37 AM    ALTSGPT 23 07/07/2022 07:37 AM    TBILIRUBIN 0.3 07/07/2022 07:37 AM       No results found for: LITHIUM  Lab Results   Component Value Date/Time    TSHULTRASEN 1.180 11/22/2019 1216     No results found for: FREET4  No results found for: FREET3        [place]  records -   reviewed    DIFFERENTIAL DIAGNOSES  Generalized anxiety disorder  Major depressive disorder, in remission  Sexual dysfunction, with paroxetine use    ASSESSMENT  This is a 57 y.o. old male with a history of  anxiety and depression who presents to the clinic today to establish with a provider for evaluation and assessment of anxiety and sexual dysfunction in the context of treatment with paroxetine.  Patient has been on paroxetine since 2019, over the duration of that period of time has had decreased libido and erectile dysfunction.  He is taking 40 mg of paroxetine for the entirety of this duration of time and has not trialed another dose or other medications for primary treatment of anxiety.  Discussed with patient today that first line strategy when side effects of medication are apparent is to reduce dose and reassess for side effect remission and symptom stability.  Thus we will plan to reduce paroxetine to 20 mg today and continue until our follow-up appointment.  As patient's wife will be out of town until the beginning of new year, will plan to follow up after his wife returns and he can have relevant feedback regarding sexual dysfunction resolution or improvement with reduction of dose.  Patient will assess and keep tabs on anxiety with reduction of dose.  If side effects remain on reduced dose of paroxetine and anxiety symptoms increase we will plan to taper paroxetine further with Prozac coverage.  Can plan for taper of paroxetine to 10 mg with Prozac coverage for 7 to 10 days at 20 mg then stop, or can continue pharmacologic therapy with Prozac at that time.  However we will plan to reassess in 2.5 months.    PLAN:  (1) decrease paroxetine to 20 mg until next follow-up in January.  Patient has supply of 40 mg tablets, does not need refill at this time  (2) taper paroxetine further at follow-up appointment if side effects remain, can consider coverage with Prozac for 7 to 10 days  (3) if anxiety increases upon taper will plan for Prozac to be primary medication for treatment of anxiety    Medication options, alternatives (including no medications) and medication risks/benefits/side effects were discussed in  detail.  Explained importance of contraceptive measures while on psychotropic medications, educated to let provider know if ever pregnant or wanting to become pregnant. Verbalized understanding.  The patient was advised to call, message provider on MyChart, or come in to the clinic if symptoms worsen or if any future questions/issues regarding their medications arise; the patient verbalized understanding and agreement.    The patient was educated to call 911, call the suicide hotline, or go to local ER if having thoughts of suicide or homicide; verbalized understanding.  I have discussed with the patient/authorized legal representative the risks, benefits and alternatives to treatment and the patient has verbalized agreement with the plan. Case discussed with the attending physician, who was present for critical components of the appointment.         Return to clinic in 2 months or sooner if symptoms worsen.  Next Appointment:  instruction provided on how to make the next appointment.     The proposed treatment plan was discussed with the patient who was provided the opportunity to ask questions and make suggestions regarding alternative treatment. Patient verbalized understanding and expressed agreement with the plan.     Thank you for allowing me to participate in the care of this patient.    Garret Gallagher D.O.  11/15/22    CC:   Celia Guo D.N.P.    This note was created using voice recognition software (Dragon). The accuracy of the dictation is limited by the abilities of the software. I have reviewed the note prior to signing, however some errors in grammar and context are still possible. If you have any questions related to this note please do not hesitate to contact our office.

## 2023-01-24 ENCOUNTER — APPOINTMENT (OUTPATIENT)
Dept: BEHAVIORAL HEALTH | Facility: CLINIC | Age: 58
End: 2023-01-24
Payer: OTHER GOVERNMENT

## 2023-02-10 ENCOUNTER — OFFICE VISIT (OUTPATIENT)
Dept: URGENT CARE | Facility: PHYSICIAN GROUP | Age: 58
End: 2023-02-10
Payer: OTHER GOVERNMENT

## 2023-02-10 VITALS
WEIGHT: 277 LBS | HEIGHT: 76 IN | OXYGEN SATURATION: 97 % | HEART RATE: 106 BPM | BODY MASS INDEX: 33.73 KG/M2 | SYSTOLIC BLOOD PRESSURE: 124 MMHG | RESPIRATION RATE: 16 BRPM | DIASTOLIC BLOOD PRESSURE: 78 MMHG | TEMPERATURE: 98.6 F

## 2023-02-10 DIAGNOSIS — Z11.52 ENCOUNTER FOR SCREENING FOR COVID-19: ICD-10-CM

## 2023-02-10 DIAGNOSIS — L02.31 LEFT BUTTOCK ABSCESS: ICD-10-CM

## 2023-02-10 LAB
FLUAV RNA SPEC QL NAA+PROBE: NEGATIVE
FLUBV RNA SPEC QL NAA+PROBE: NEGATIVE
RSV RNA SPEC QL NAA+PROBE: NEGATIVE
SARS-COV-2 RNA RESP QL NAA+PROBE: NOT DETECTED

## 2023-02-10 PROCEDURE — 99213 OFFICE O/P EST LOW 20 MIN: CPT | Mod: CS | Performed by: FAMILY MEDICINE

## 2023-02-10 PROCEDURE — 0241U POCT CEPHEID COV-2, FLU A/B, RSV - PCR: CPT | Performed by: FAMILY MEDICINE

## 2023-02-10 RX ORDER — DOXYCYCLINE HYCLATE 100 MG
100 TABLET ORAL 2 TIMES DAILY
Qty: 20 TABLET | Refills: 0 | Status: SHIPPED | OUTPATIENT
Start: 2023-02-10 | End: 2023-02-20

## 2023-02-10 ASSESSMENT — FIBROSIS 4 INDEX: FIB4 SCORE: 1.4

## 2023-02-10 NOTE — PROGRESS NOTES
Chief Complaint:    Chief Complaint   Patient presents with    Congestion     Pt states he feels cold    Abscess     Located in buttocks        History of Present Illness:    On 2/8/23, started feeling discomfort in left buttock region, cold, fatigue, body aches, and mild nasal symptoms. No sore throat or cough. Co-worker was sniffling a lot this week.      Past Medical History:    Past Medical History:   Diagnosis Date    Anxiety     Depression     Hyperlipidemia     Hypertension 10/14/2014     Past Surgical History:    Past Surgical History:   Procedure Laterality Date    DENTAL SURGERY       Social History:    Social History     Socioeconomic History    Marital status:      Spouse name: Not on file    Number of children: Not on file    Years of education: Not on file    Highest education level: Bachelor's degree (e.g., BA, AB, BS)   Occupational History    Not on file   Tobacco Use    Smoking status: Some Days     Packs/day: 0.00     Types: Cigars, Cigarettes     Last attempt to quit: 4/2/2019     Years since quitting: 3.8    Smokeless tobacco: Former    Tobacco comments:     once a month cigar   Vaping Use    Vaping Use: Never used   Substance and Sexual Activity    Alcohol use: Yes     Alcohol/week: 1.2 oz     Types: 2 Cans of beer per week     Comment: occ    Drug use: No    Sexual activity: Yes     Partners: Female     Comment: in relationship, .    Other Topics Concern    Not on file   Social History Narrative    Not on file     Social Determinants of Health     Financial Resource Strain: Low Risk     Difficulty of Paying Living Expenses: Not very hard   Food Insecurity: Unknown    Worried About Running Out of Food in the Last Year: Never true    Ran Out of Food in the Last Year: Patient refused   Transportation Needs: No Transportation Needs    Lack of Transportation (Medical): No    Lack of Transportation (Non-Medical): No   Physical Activity: Insufficiently Active    Days of  "Exercise per Week: 5 days    Minutes of Exercise per Session: 10 min   Stress: Stress Concern Present    Feeling of Stress : To some extent   Social Connections: Socially Integrated    Frequency of Communication with Friends and Family: Twice a week    Frequency of Social Gatherings with Friends and Family: Twice a week    Attends [employer] Services: 1 to 4 times per year    Active Member of Clubs or Organizations: Yes    Attends Club or Organization Meetings: 1 to 4 times per year    Marital Status:    Intimate Partner Violence: Not on file   Housing Stability: Unknown    Unable to Pay for Housing in the Last Year: Not on file    Number of Places Lived in the Last Year: 1    Unstable Housing in the Last Year: No     Family History:    Family History   Problem Relation Age of Onset    Heart Disease Father         dysrhythmia with pacer    Cancer Father         skin cancer    Cancer Maternal Grandfather         leukemia    Alcohol/Drug Paternal Grandfather     Diabetes Neg Hx     Stroke Neg Hx      Medications:    Current Outpatient Medications on File Prior to Visit   Medication Sig Dispense Refill    rosuvastatin (CRESTOR) 5 MG Tab Take 1 Tablet by mouth every evening. 90 Tablet 3    paroxetine (PAXIL) 40 MG tablet Take 1 Tablet by mouth every day. 90 Tablet 3    lisinopril-hydrochlorothiazide (PRINZIDE) 20-25 MG per tablet Take 1 Tablet by mouth every day. 90 Tablet 3    CLINDAMYCIN PHOSPHATE,TOPICAL, 1 % Lotion APPLY TO SCALP TWICE DAILY      Aug Betamethasone Dipropionate (DIPROLENE-AF) 0.05 % Cream        No current facility-administered medications on file prior to visit.     Allergies:    No Known Allergies      Vitals:    Vitals:    02/10/23 1219   BP: 124/78   Pulse: (!) 106   Resp: 16   Temp: 37 °C (98.6 °F)   TempSrc: Temporal   SpO2: 97%   Weight: (!) 126 kg (277 lb)   Height: 1.918 m (6' 3.5\")       Physical Exam:    Constitutional: Vital signs reviewed. Appears well-developed and well-nourished. " No acute distress.   Eyes: Sclera white, conjunctivae clear.   ENT: External ears normal. Hearing normal.   Neck: Neck supple.   Pulmonary/Chest: Respirations non-labored.   Musculoskeletal: Normal gait. No muscular atrophy or weakness.  Neurological: Alert and oriented to person, place, and time. Muscle tone normal. Coordination normal.   Skin: Left buttock: inferomedial region has focal erythematous, swollen, soft area that is tender to palpation.  Psychiatric: Normal mood and affect. Behavior is normal. Judgment and thought content normal.       Diagnostics:    Cepheid PCR test for Covid, Flu, and RSV is negative.       Medical Decision Makin. Encounter for screening for COVID-19  - POCT CoV-2, Flu A/B, RSV by PCR    2. Left buttock abscess  - doxycycline (VIBRAMYCIN) 100 MG Tab; Take 1 Tablet by mouth 2 times a day for 10 days.  Dispense: 20 Tablet; Refill: 0       Discussed with him DDX, management options, and risks, benefits, and alternatives to treatment plan agreed upon.    On 23, started feeling discomfort in left buttock region, cold, fatigue, body aches, and mild nasal symptoms. No sore throat or cough. Co-worker was sniffling a lot this week.    Cepheid PCR test for Covid, Flu, and RSV is negative.     Advised lesion left buttock is equivocal if I&D would be helpful today, but there may be a little pus in there.    He is agreeable to try small I&D attempt.    Skin cleansed with alcohol.    18-G needle use to cresencio the most fluctuant area of this lesion - immediately blood came out (like built up blood), but no pus came out.    Lesion covered with bandage which he will remove later today and may replace daily bandage as needed.    Agreeable to medication prescribed.    Discussed expected course of duration, time for improvement, and to seek follow-up in Emergency Room, urgent care, or with PCP if getting worse at any time or not improving within expected time frame.

## 2023-02-24 ENCOUNTER — APPOINTMENT (OUTPATIENT)
Dept: BEHAVIORAL HEALTH | Facility: CLINIC | Age: 58
End: 2023-02-24
Payer: OTHER GOVERNMENT

## 2023-03-09 ENCOUNTER — OFFICE VISIT (OUTPATIENT)
Dept: MEDICAL GROUP | Facility: PHYSICIAN GROUP | Age: 58
End: 2023-03-09
Payer: OTHER GOVERNMENT

## 2023-03-09 VITALS
TEMPERATURE: 97.9 F | SYSTOLIC BLOOD PRESSURE: 124 MMHG | OXYGEN SATURATION: 98 % | RESPIRATION RATE: 16 BRPM | DIASTOLIC BLOOD PRESSURE: 84 MMHG | BODY MASS INDEX: 33.69 KG/M2 | HEART RATE: 80 BPM | WEIGHT: 271 LBS | HEIGHT: 75 IN

## 2023-03-09 DIAGNOSIS — L02.31 LEFT BUTTOCK ABSCESS: ICD-10-CM

## 2023-03-09 DIAGNOSIS — N52.9 ERECTILE DYSFUNCTION, UNSPECIFIED ERECTILE DYSFUNCTION TYPE: ICD-10-CM

## 2023-03-09 DIAGNOSIS — I10 PRIMARY HYPERTENSION: ICD-10-CM

## 2023-03-09 PROCEDURE — 99214 OFFICE O/P EST MOD 30 MIN: CPT | Performed by: NURSE PRACTITIONER

## 2023-03-09 RX ORDER — LISINOPRIL AND HYDROCHLOROTHIAZIDE 25; 20 MG/1; MG/1
1 TABLET ORAL
Qty: 90 TABLET | Refills: 3 | Status: SHIPPED | OUTPATIENT
Start: 2023-03-09 | End: 2024-02-26 | Stop reason: SDUPTHER

## 2023-03-09 RX ORDER — CLINDAMYCIN PHOSPHATE 11.9 MG/ML
SOLUTION TOPICAL
COMMUNITY
Start: 2023-01-05

## 2023-03-09 RX ORDER — SILDENAFIL 50 MG/1
50 TABLET, FILM COATED ORAL
Qty: 10 TABLET | Refills: 1 | Status: SHIPPED | OUTPATIENT
Start: 2023-03-09 | End: 2024-01-18

## 2023-03-09 ASSESSMENT — FIBROSIS 4 INDEX: FIB4 SCORE: 1.4

## 2023-03-09 ASSESSMENT — PATIENT HEALTH QUESTIONNAIRE - PHQ9: CLINICAL INTERPRETATION OF PHQ2 SCORE: 0

## 2023-03-09 NOTE — PROGRESS NOTES
Subjective       CC:   Chief Complaint   Patient presents with    Hypertension Follow-up        HPI:   Patient is a 57 y.o. established male patient with medical history listed below here today for evaluation and management of hypertension, erectile dysfuction. He will get labs done tomorrow.   Active concern today is skin abscess on left buttock was seen in  for this and completed 10 day course of doxycline 2/10-2/20/2023. Still with painful nodule, does not appear inflamed superficially on exam but it is hard & painful to palpation with possible tracking. Recently had colonoscopy in 8/2022, internal hemorrhoids noted; recall in 7 years. Will send to gen surg for I&D.    Problem   Erectile Dysfunction   Hypertension    Taking Lisinopril-HCTZ 20-25mg QD         Patient Active Problem List   Diagnosis    Hypertension    Skin lesion    Chronic low back pain    Hyperlipidemia    Environmental allergies    Obesity (BMI 30-39.9)    Eczema    Family history of prostate cancer in father    Erectile dysfunction    Toenail fungus    Elevated liver function tests    Cough    Rectal lump    Cerumen debris on tympanic membrane of both ears    Anxiety    Major depressive disorder    Elevated fasting glucose    Problems in relationship with spouse or partner    Grief counseling    Acute pain of right shoulder    Lower urinary tract symptoms (LUTS)       Past Medical History:   Diagnosis Date    Anxiety     Depression     Hyperlipidemia     Hypertension 10/14/2014        Past Surgical History:   Procedure Laterality Date    DENTAL SURGERY          Current Outpatient Medications on File Prior to Visit   Medication Sig Dispense Refill    clindamycin (CLEOCIN) 1 % Solution APPLY TO SCALP TWICE DAILY      rosuvastatin (CRESTOR) 5 MG Tab Take 1 Tablet by mouth every evening. 90 Tablet 3    Aug Betamethasone Dipropionate (DIPROLENE-AF) 0.05 % Cream        No current facility-administered medications on file prior to visit.     "    Health Maintenance: Completed    ROS:  Review of Systems   Constitutional: Negative.  Negative for fever and malaise/fatigue.   HENT: Negative.     Eyes: Negative.    Respiratory:  Negative for cough, sputum production and shortness of breath.    Cardiovascular:  Negative for chest pain, palpitations and leg swelling.   Gastrointestinal: Negative.    Genitourinary: Negative.         Erectile dysfuction   Musculoskeletal: Negative.    Skin:         Skin abscess left buttock   Neurological: Negative.    Endo/Heme/Allergies: Negative.    Psychiatric/Behavioral: Negative.       Objective       Exam:  /84   Pulse 80   Temp 36.6 °C (97.9 °F) (Temporal)   Resp 16   Ht 1.905 m (6' 3\")   Wt 123 kg (271 lb)   SpO2 98%   BMI 33.87 kg/m²  Body mass index is 33.87 kg/m².    Physical Exam  Constitutional:       Appearance: Normal appearance.   Cardiovascular:      Rate and Rhythm: Normal rate and regular rhythm.      Pulses: Normal pulses.      Heart sounds: Normal heart sounds.   Pulmonary:      Effort: Pulmonary effort is normal.      Breath sounds: Normal breath sounds.   Genitourinary:         Comments: Subcutaneous nodule- hard & painful to touch.   Musculoskeletal:      Right lower leg: No edema.      Left lower leg: No edema.   Skin:     General: Skin is warm and dry.   Neurological:      General: No focal deficit present.      Mental Status: He is alert and oriented to person, place, and time.   Psychiatric:         Mood and Affect: Mood normal.         Behavior: Behavior normal.         Thought Content: Thought content normal.         Judgment: Judgment normal.       Assessment & Plan       57 y.o. male with the following -     Problem List Items Addressed This Visit       Hypertension (Chronic)     Chronic; goal BP < 130/80  BP in clinic today 124/84; no CP, dizziness, palpitations  - continue Lisinopril-HCTZ 20-25mg QD  - he will get home BP cuff & keep weekly log; we plan to start reducing BP meds as " he gets active & BP gets lower  - reviewed CMP results at last visit; slightly elevated BUN, he is hydrating; ALP improving, still elevated; we reduced rosuvastatin in 8/2022 & he is due for 6 month recheck; he will get labs done today.          Relevant Medications    sildenafil citrate (VIAGRA) 50 MG tablet    lisinopril-hydrochlorothiazide (PRINZIDE) 20-25 MG per tablet    Erectile dysfunction     Able to get erection, problem with sustaining and low libido; over 10 years now; States he saw urology at [facility]; was prescribed Viagra and never took. He recently tried it about a month ago and got some improvement with erection. He would like refill today.   He is also on paxil for Anxiety, sees Dr Gallagher/psychiatry for management, aware of ED; currently titrating down Paxil dosage 20mg<-- 40mg;  - continue prn Viagra 50mg; reviewed use, risk for hypotension, avoid taking at same time as antihypertensives         Relevant Medications    sildenafil citrate (VIAGRA) 50 MG tablet     Other Visit Diagnoses       Left buttock abscess        Relevant Orders    Referral to General Surgery          Educated in proper administration of medication(s) ordered today including safety, possible SE, risks, benefits, rationale and alternatives to therapy.     Return in about 3 months (around 6/9/2023) for HTN.    Please note that this dictation was created using voice recognition software. I have made every reasonable attempt to correct obvious errors, but I expect that there are errors of grammar and possibly content that I did not discover before finalizing the note.

## 2023-03-09 NOTE — ASSESSMENT & PLAN NOTE
Able to get erection, problem with sustaining and low libido; over 10 years now; States he saw urology at [place]; was prescribed Viagra and never took. He recently tried it about a month ago and got some improvement with erection. He would like refill today.   He is also on paxil for Anxiety, sees Dr Gallagher/psychiatry for management, aware of ED; currently titrating down Paxil dosage 20mg<-- 40mg;  - continue prn Viagra 50mg; reviewed use, risk for hypotension, avoid taking at same time as antihypertensives

## 2023-03-10 ENCOUNTER — OFFICE VISIT (OUTPATIENT)
Dept: BEHAVIORAL HEALTH | Facility: CLINIC | Age: 58
End: 2023-03-10
Payer: OTHER GOVERNMENT

## 2023-03-10 DIAGNOSIS — F41.9 ANXIETY: ICD-10-CM

## 2023-03-10 PROCEDURE — 99214 OFFICE O/P EST MOD 30 MIN: CPT | Performed by: PSYCHIATRY & NEUROLOGY

## 2023-03-10 RX ORDER — FLUOXETINE HYDROCHLORIDE 20 MG/1
20 CAPSULE ORAL EVERY MORNING
Qty: 8 CAPSULE | Refills: 0 | Status: SHIPPED | OUTPATIENT
Start: 2023-03-10 | End: 2023-03-18

## 2023-03-10 RX ORDER — PAROXETINE 10 MG/1
10 TABLET, FILM COATED ORAL DAILY
Qty: 8 TABLET | Refills: 0 | Status: SHIPPED | OUTPATIENT
Start: 2023-03-10 | End: 2023-03-18

## 2023-03-10 NOTE — PROGRESS NOTES
"  PSYCHIATRY FOLLOW-UP NOTE      Name: Dwight Linares  MRN: [id number]  : 1965  Age: 57 y.o.  Date of assessment: 3/10/2023  PCP: Celia Guo D.N.P.  Persons in attendance: Patient      REASON FOR VISIT/CHIEF COMPLAINT (as stated by Patient):  Dwight Linares is a 57 y.o., White male, attending follow-up appointment for anxiety management.      SUBJECTIVE/HPI  Dwight Linares is a 57 y.o. old male with anxiety who comes in today for follow up. Patient was last seen on 11/15/22, at which time the plan was to: reduce paroxetine from 40mg to 20mg.    Patient reduced paroxetine from 40 to 20 mg without note of any withdrawal side effects.  Patient had minimal improvement in sexual function, \"not really that noticeable\".  Patient did not have increase in anxiety that was detrimental to his work or relationships.  Reports that he would like to get rid of all medication if he could, and would like to stop paroxetine completely to see how things go.  Reports that he met with his primary care provider yesterday, who prescribed sildenafil to assist with erectile dysfunction.  Patient denies decrease in mood, denies depressed mood.        CURRENT MEDICATIONS:  Current Outpatient Medications   Medication Sig Dispense Refill    clindamycin (CLEOCIN) 1 % Solution APPLY TO SCALP TWICE DAILY      sildenafil citrate (VIAGRA) 50 MG tablet Take 1 Tablet by mouth 1 time a day as needed for Erectile Dysfunction. 10 Tablet 1    lisinopril-hydrochlorothiazide (PRINZIDE) 20-25 MG per tablet Take 1 Tablet by mouth every day. 90 Tablet 3    rosuvastatin (CRESTOR) 5 MG Tab Take 1 Tablet by mouth every evening. 90 Tablet 3    paroxetine (PAXIL) 40 MG tablet Take 1 Tablet by mouth every day. 90 Tablet 3    Aug Betamethasone Dipropionate (DIPROLENE-AF) 0.05 % Cream        No current facility-administered medications for this visit.       MEDICAL HISTORY  Past Medical History:   Diagnosis Date    Anxiety     Depression     Hyperlipidemia     " Hypertension 10/14/2014     Past Surgical History:   Procedure Laterality Date    DENTAL SURGERY         ALLERGIES:  Patient has no known allergies.     PAST PSYCHIATRIC HISTORY  Prior psychiatric hospitalization: No  Prior Self harm/suicide attempt: No  Prior Diagnosis: Anxiety, depression     PAST PSYCHIATRIC MEDICATIONS  Paroxetine 40 mg, effective in treating anxiety, experience significant sexual dysfunction (decreased libido, erectile dysfunction)     FAMILY HISTORY  Psychiatric diagnosis: Denies   History of suicide attempts: Denies  Substance abuse history: Denies     SUBSTANCE USE HISTORY:  ALCOHOL: Drinks 4-5 times a year  TOBACCO: Smokes a cigar occasionally  CANNABIS: No  OPIOIDS: No  PRESCRIPTION MEDICATIONS: No  OTHERS: Denies  History of inpatient/outpatient rehab treatment: No        SOCIAL HISTORY  Childhood: born in [place]  Education: High school graduate  in Special Education: No  Intellectual Disability: No  Employment: Served in the [employer] for 22 years, is now a   Relationship: Has been  6 times, this is the second time he has been  to his current wife, no children with current wife  Kids: Has 2 grown sons who live in [place]  Current living situation: Lives in [facility]] with his wife  Current/past legal issues: None   History: 22 years in the [employer]  Spiritual/Gnosticist affiliation: Did not discuss      REVIEW OF SYSTEMS:        Constitutional negative   Eyes negative   Ears/Nose/Mouth/Throat negative   Cardiovascular negative   Respiratory negative   Gastrointestinal negative   Genitourinary Erectile dysfunction   Muscular negative   Integumentary negative   Neurological negative   Endocrine negative   Hematologic/Lymphatic negative     PHYSICAL EXAMINATION:  Vital signs: There were no vitals taken for this visit.  Musculoskeletal: Normal gait.   Abnormal movements: no      MENTAL STATUS EXAMINATION      General:   - Grooming and hygiene: Adequate and  Casual,   - Apparent distress: no,   - Behavior: Calm  - Eye Contact:  Good,   - no psychomotor agitation or retardation    - Participation: Active verbal participation  Orientation: Alert and Fully Oriented to person, place and time  Mood: Euthymic  Affect: Flexible and Congruent with content,  Thought Process: Logical and Goal-directed  Thought Content: Denies suicidal or homicidal ideations, intent or plan Within normal limits  Perception: Denies auditory or visual hallucinations. No delusions noted Within normal limits  Attention span and concentration: Intact   Speech:Rate within normal limits and Volume within normal limits  Language: Appropriate   Insight: Good  Judgment: Good  Recent and remote memory: No gross evidence of memory deficits        DEPRESSION SCREENIN/11/2022     2:20 PM 11/15/2022     2:30 PM 3/9/2023     9:40 AM   Depression Screen (PHQ-2/PHQ-9)   PHQ-2 Total Score 0 2 0   PHQ-9 Total Score  10        Interpretation of PHQ-9 Total Score   Score Severity   1-4 No Depression   5-9 Mild Depression   10-14 Moderate Depression   15-19 Moderately Severe Depression   20-27 Severe Depression    CURRENT RISK:       Suicidal: Low       Homicidal: Low       Self-Harm: Low       Relapse: Not applicable       Crisis Safety Plan Reviewed Not Indicated       If evidence of imminent risk is present, intervention/plan:      MEDICAL RECORDS/LABS/DIAGNOSTIC TESTS REVIEWED:  No new lab since last visit     [place]  records -   Reviewed       ASSESSMENT:  57 year old male history of anxiety and depression who we decrease paroxetine from 40 to 20 mg at last visit due to prolonged sexual dysfunction since taking paroxetine.  Little benefit and issues with erectile dysfunction since reduction of dose, however is not resolved and is still problematic.  Thus we will plan to discontinue paroxetine while providing coverage with Prozac to minimize withdrawal side effects as patient has been on paroxetine for  several years.  There is no deterioration in mood or increase in anxiety with reduction of dose of paroxetine, thus we will trial patient on no medication and see what patient's baseline functioning is and if sexual dysfunction improves.    DDX:  1.  Generalized anxiety disorder  2. Major depressive disorder, in remission  3.  Sexual dysfunction, with paroxetine use    PLAN:  (1) reduce paroxetine to 10 mg for 8 days then stop  (2) we will provide coverage with Prozac for 8 days, patient to then stop to reduce chance of significant withdrawal side effects from discontinuation of paroxetine      Medication options, alternatives (including no medications) and medication risks/benefits/side effects were discussed in detail.  The patient was advised to call, message provider on [employer], or come in to the clinic if symptoms worsen or if any future questions/issues regarding their medications arise; the patient verbalized understanding and agreement.  The patient was educated to call 911, call the suicide hotline, or go to local ER if having thoughts of suicide or homicide; verbalized understanding.      Return to clinic in 4 weeks or sooner if symptoms worsen.  Next Appointment: instruction provided on how to make the next appointment.     The proposed treatment plan was discussed with the patient who was provided the opportunity to ask questions and make suggestions regarding alternative treatment. Patient verbalized understanding and expressed agreement with the plan.       Garret Gallagher D.O.  03/10/23    This note was created using voice recognition software (Dragon). The accuracy of the dictation is limited by the abilities of the software. I have reviewed the note prior to signing, however some errors in grammar and context are still possible. If you have any questions related to this note please do not hesitate to contact our office.

## 2023-03-12 ASSESSMENT — ENCOUNTER SYMPTOMS
SHORTNESS OF BREATH: 0
PALPITATIONS: 0
SPUTUM PRODUCTION: 0
CONSTITUTIONAL NEGATIVE: 1
PSYCHIATRIC NEGATIVE: 1
EYES NEGATIVE: 1
NEUROLOGICAL NEGATIVE: 1
COUGH: 0
MUSCULOSKELETAL NEGATIVE: 1
GASTROINTESTINAL NEGATIVE: 1
FEVER: 0

## 2023-03-12 NOTE — ASSESSMENT & PLAN NOTE
Chronic; goal BP < 130/80  BP in clinic today 124/84; no CP, dizziness, palpitations  - continue Lisinopril-HCTZ 20-25mg QD  - he will get home BP cuff & keep weekly log; we plan to start reducing BP meds as he gets active & BP gets lower  - reviewed CMP results at last visit; slightly elevated BUN, he is hydrating; ALP improving, still elevated; we reduced rosuvastatin in 8/2022 & he is due for 6 month recheck; he will get labs done today.

## 2023-03-31 ENCOUNTER — NON-PROVIDER VISIT (OUTPATIENT)
Dept: MEDICAL GROUP | Facility: PHYSICIAN GROUP | Age: 58
End: 2023-03-31
Payer: OTHER GOVERNMENT

## 2023-03-31 DIAGNOSIS — Z23 NEED FOR VACCINATION: ICD-10-CM

## 2023-03-31 PROCEDURE — 90677 PCV20 VACCINE IM: CPT | Performed by: NURSE PRACTITIONER

## 2023-03-31 PROCEDURE — 99999 PR NO CHARGE: CPT | Performed by: NURSE PRACTITIONER

## 2023-03-31 PROCEDURE — 90471 IMMUNIZATION ADMIN: CPT | Performed by: NURSE PRACTITIONER

## 2023-03-31 NOTE — PROGRESS NOTES
"Dwight Linares is a 57 y.o. male here for a non-provider visit for:   PNEUMOVAX (PPSV23)    Reason for immunization: Overdue/Provider Recommended  Immunization records indicate need for vaccine: Yes, confirmed with Epic  Minimum interval has been met for this vaccine: Yes  ABN completed: Yes    VIS Dated  03/31/23 was given to patient: Yes  All IAC Questionnaire questions were answered \"No.\"    Patient tolerated injection and no adverse effects were observed or reported: Yes    Pt scheduled for next dose in series: Not Indicated   "

## 2023-04-07 ENCOUNTER — APPOINTMENT (OUTPATIENT)
Dept: BEHAVIORAL HEALTH | Facility: CLINIC | Age: 58
End: 2023-04-07
Payer: OTHER GOVERNMENT

## 2023-04-14 ENCOUNTER — APPOINTMENT (OUTPATIENT)
Dept: ADMISSIONS | Facility: MEDICAL CENTER | Age: 58
End: 2023-04-14
Attending: SURGERY
Payer: OTHER GOVERNMENT

## 2023-04-20 ENCOUNTER — PRE-ADMISSION TESTING (OUTPATIENT)
Dept: ADMISSIONS | Facility: MEDICAL CENTER | Age: 58
End: 2023-04-20
Attending: SURGERY
Payer: OTHER GOVERNMENT

## 2023-04-20 VITALS — HEIGHT: 75 IN | BODY MASS INDEX: 33.87 KG/M2

## 2023-04-20 DIAGNOSIS — Z01.812 PRE-OPERATIVE LABORATORY EXAMINATION: ICD-10-CM

## 2023-04-20 DIAGNOSIS — Z01.810 PRE-OPERATIVE CARDIOVASCULAR EXAMINATION: ICD-10-CM

## 2023-04-20 LAB
ALBUMIN SERPL BCP-MCNC: 4.4 G/DL (ref 3.2–4.9)
ALBUMIN/GLOB SERPL: 1.4 G/DL
ALP SERPL-CCNC: 131 U/L (ref 30–99)
ALT SERPL-CCNC: 22 U/L (ref 2–50)
ANION GAP SERPL CALC-SCNC: 12 MMOL/L (ref 7–16)
AST SERPL-CCNC: 25 U/L (ref 12–45)
BASOPHILS # BLD AUTO: 0.4 % (ref 0–1.8)
BASOPHILS # BLD: 0.03 K/UL (ref 0–0.12)
BILIRUB SERPL-MCNC: 0.2 MG/DL (ref 0.1–1.5)
BUN SERPL-MCNC: 22 MG/DL (ref 8–22)
CALCIUM ALBUM COR SERPL-MCNC: 8.6 MG/DL (ref 8.5–10.5)
CALCIUM SERPL-MCNC: 8.9 MG/DL (ref 8.4–10.2)
CHLORIDE SERPL-SCNC: 101 MMOL/L (ref 96–112)
CO2 SERPL-SCNC: 24 MMOL/L (ref 20–33)
CREAT SERPL-MCNC: 0.91 MG/DL (ref 0.5–1.4)
EKG IMPRESSION: NORMAL
EOSINOPHIL # BLD AUTO: 0.06 K/UL (ref 0–0.51)
EOSINOPHIL NFR BLD: 0.9 % (ref 0–6.9)
ERYTHROCYTE [DISTWIDTH] IN BLOOD BY AUTOMATED COUNT: 44.7 FL (ref 35.9–50)
GFR SERPLBLD CREATININE-BSD FMLA CKD-EPI: 97 ML/MIN/1.73 M 2
GLOBULIN SER CALC-MCNC: 3.2 G/DL (ref 1.9–3.5)
GLUCOSE SERPL-MCNC: 83 MG/DL (ref 65–99)
HCT VFR BLD AUTO: 49.2 % (ref 42–52)
HGB BLD-MCNC: 16.8 G/DL (ref 14–18)
IMM GRANULOCYTES # BLD AUTO: 0.02 K/UL (ref 0–0.11)
IMM GRANULOCYTES NFR BLD AUTO: 0.3 % (ref 0–0.9)
LYMPHOCYTES # BLD AUTO: 1.95 K/UL (ref 1–4.8)
LYMPHOCYTES NFR BLD: 28.8 % (ref 22–41)
MCH RBC QN AUTO: 31.6 PG (ref 27–33)
MCHC RBC AUTO-ENTMCNC: 34.1 G/DL (ref 33.7–35.3)
MCV RBC AUTO: 92.7 FL (ref 81.4–97.8)
MONOCYTES # BLD AUTO: 0.62 K/UL (ref 0–0.85)
MONOCYTES NFR BLD AUTO: 9.1 % (ref 0–13.4)
NEUTROPHILS # BLD AUTO: 4.1 K/UL (ref 1.82–7.42)
NEUTROPHILS NFR BLD: 60.5 % (ref 44–72)
NRBC # BLD AUTO: 0 K/UL
NRBC BLD-RTO: 0 /100 WBC
PLATELET # BLD AUTO: 273 K/UL (ref 164–446)
PMV BLD AUTO: 10.3 FL (ref 9–12.9)
POTASSIUM SERPL-SCNC: 3.9 MMOL/L (ref 3.6–5.5)
PROT SERPL-MCNC: 7.6 G/DL (ref 6–8.2)
RBC # BLD AUTO: 5.31 M/UL (ref 4.7–6.1)
SODIUM SERPL-SCNC: 137 MMOL/L (ref 135–145)
WBC # BLD AUTO: 6.8 K/UL (ref 4.8–10.8)

## 2023-04-20 PROCEDURE — 36415 COLL VENOUS BLD VENIPUNCTURE: CPT

## 2023-04-20 PROCEDURE — 93010 ELECTROCARDIOGRAM REPORT: CPT | Performed by: INTERNAL MEDICINE

## 2023-04-20 PROCEDURE — 85025 COMPLETE CBC W/AUTO DIFF WBC: CPT

## 2023-04-20 PROCEDURE — 80053 COMPREHEN METABOLIC PANEL: CPT

## 2023-04-20 PROCEDURE — 93005 ELECTROCARDIOGRAM TRACING: CPT

## 2023-04-20 RX ORDER — CETIRIZINE HYDROCHLORIDE 10 MG/1
10 TABLET ORAL DAILY
COMMUNITY

## 2023-04-20 NOTE — PREPROCEDURE INSTRUCTIONS
"Preadmit appt completed with pt  Hx and meds reviewed with pt, preadmit instructions \"Preparing for your procedure\" \"Speak Up\" \"Patient Safety\"  \"Pain Mangement\" and \"Fasting guidelines\" brochures reviewed and emailed. Anesthesia fasting guidelines reviewed and per anesthesia protocol pt instructed to take these medications with a sip of water the day of surgery- zyrte.  Pt verbalizes understanding of all medication and preadmit instruction.    Pt instructed to report any flu/cold sx to surgeon and preadmit and continue to wear a mask and follow infection prevention practices.    "

## 2023-04-26 ENCOUNTER — ANESTHESIA EVENT (OUTPATIENT)
Dept: SURGERY | Facility: MEDICAL CENTER | Age: 58
End: 2023-04-26
Payer: OTHER GOVERNMENT

## 2023-04-27 ENCOUNTER — ANESTHESIA (OUTPATIENT)
Dept: SURGERY | Facility: MEDICAL CENTER | Age: 58
End: 2023-04-27
Payer: OTHER GOVERNMENT

## 2023-04-27 ENCOUNTER — HOSPITAL ENCOUNTER (OUTPATIENT)
Facility: MEDICAL CENTER | Age: 58
End: 2023-04-27
Attending: SURGERY | Admitting: SURGERY
Payer: OTHER GOVERNMENT

## 2023-04-27 VITALS
HEIGHT: 72 IN | HEART RATE: 89 BPM | BODY MASS INDEX: 37.62 KG/M2 | SYSTOLIC BLOOD PRESSURE: 130 MMHG | OXYGEN SATURATION: 97 % | TEMPERATURE: 96.8 F | DIASTOLIC BLOOD PRESSURE: 84 MMHG | WEIGHT: 277.78 LBS | RESPIRATION RATE: 18 BRPM

## 2023-04-27 DIAGNOSIS — K60.3 ANAL FISTULA: ICD-10-CM

## 2023-04-27 PROCEDURE — 700101 HCHG RX REV CODE 250: Performed by: SURGERY

## 2023-04-27 PROCEDURE — 160046 HCHG PACU - 1ST 60 MINS PHASE II: Performed by: SURGERY

## 2023-04-27 PROCEDURE — 160025 RECOVERY II MINUTES (STATS): Performed by: SURGERY

## 2023-04-27 PROCEDURE — 700105 HCHG RX REV CODE 258: Performed by: SURGERY

## 2023-04-27 PROCEDURE — 160027 HCHG SURGERY MINUTES - 1ST 30 MINS LEVEL 2: Performed by: SURGERY

## 2023-04-27 PROCEDURE — 160035 HCHG PACU - 1ST 60 MINS PHASE I: Performed by: SURGERY

## 2023-04-27 PROCEDURE — 160038 HCHG SURGERY MINUTES - EA ADDL 1 MIN LEVEL 2: Performed by: SURGERY

## 2023-04-27 PROCEDURE — A9270 NON-COVERED ITEM OR SERVICE: HCPCS | Performed by: SURGERY

## 2023-04-27 PROCEDURE — 700111 HCHG RX REV CODE 636 W/ 250 OVERRIDE (IP): Performed by: ANESTHESIOLOGY

## 2023-04-27 PROCEDURE — 700102 HCHG RX REV CODE 250 W/ 637 OVERRIDE(OP): Performed by: SURGERY

## 2023-04-27 PROCEDURE — 160002 HCHG RECOVERY MINUTES (STAT): Performed by: SURGERY

## 2023-04-27 PROCEDURE — 700102 HCHG RX REV CODE 250 W/ 637 OVERRIDE(OP): Performed by: ANESTHESIOLOGY

## 2023-04-27 PROCEDURE — 700101 HCHG RX REV CODE 250: Performed by: ANESTHESIOLOGY

## 2023-04-27 PROCEDURE — 160009 HCHG ANES TIME/MIN: Performed by: SURGERY

## 2023-04-27 PROCEDURE — 160048 HCHG OR STATISTICAL LEVEL 1-5: Performed by: SURGERY

## 2023-04-27 PROCEDURE — C1894 INTRO/SHEATH, NON-LASER: HCPCS | Performed by: SURGERY

## 2023-04-27 PROCEDURE — A9270 NON-COVERED ITEM OR SERVICE: HCPCS | Performed by: ANESTHESIOLOGY

## 2023-04-27 PROCEDURE — 00902 ANES ANORECTAL PX: CPT | Performed by: ANESTHESIOLOGY

## 2023-04-27 RX ORDER — EPHEDRINE SULFATE 50 MG/ML
5 INJECTION, SOLUTION INTRAVENOUS
Status: DISCONTINUED | OUTPATIENT
Start: 2023-04-27 | End: 2023-04-27 | Stop reason: HOSPADM

## 2023-04-27 RX ORDER — OXYCODONE HCL 5 MG/5 ML
5 SOLUTION, ORAL ORAL
Status: DISCONTINUED | OUTPATIENT
Start: 2023-04-27 | End: 2023-04-27 | Stop reason: HOSPADM

## 2023-04-27 RX ORDER — HYDROMORPHONE HYDROCHLORIDE 1 MG/ML
0.1 INJECTION, SOLUTION INTRAMUSCULAR; INTRAVENOUS; SUBCUTANEOUS
Status: DISCONTINUED | OUTPATIENT
Start: 2023-04-27 | End: 2023-04-27 | Stop reason: HOSPADM

## 2023-04-27 RX ORDER — MEPERIDINE HYDROCHLORIDE 50 MG/ML
12.5 INJECTION INTRAMUSCULAR; INTRAVENOUS; SUBCUTANEOUS
Status: DISCONTINUED | OUTPATIENT
Start: 2023-04-27 | End: 2023-04-27 | Stop reason: HOSPADM

## 2023-04-27 RX ORDER — SUCCINYLCHOLINE CHLORIDE 20 MG/ML
INJECTION INTRAMUSCULAR; INTRAVENOUS PRN
Status: DISCONTINUED | OUTPATIENT
Start: 2023-04-27 | End: 2023-04-27 | Stop reason: SURG

## 2023-04-27 RX ORDER — ONDANSETRON 2 MG/ML
4 INJECTION INTRAMUSCULAR; INTRAVENOUS
Status: DISCONTINUED | OUTPATIENT
Start: 2023-04-27 | End: 2023-04-27 | Stop reason: HOSPADM

## 2023-04-27 RX ORDER — ROCURONIUM BROMIDE 10 MG/ML
INJECTION, SOLUTION INTRAVENOUS PRN
Status: DISCONTINUED | OUTPATIENT
Start: 2023-04-27 | End: 2023-04-27 | Stop reason: SURG

## 2023-04-27 RX ORDER — HALOPERIDOL 5 MG/ML
1 INJECTION INTRAMUSCULAR
Status: DISCONTINUED | OUTPATIENT
Start: 2023-04-27 | End: 2023-04-27 | Stop reason: HOSPADM

## 2023-04-27 RX ORDER — DIPHENHYDRAMINE HYDROCHLORIDE 50 MG/ML
12.5 INJECTION INTRAMUSCULAR; INTRAVENOUS
Status: DISCONTINUED | OUTPATIENT
Start: 2023-04-27 | End: 2023-04-27 | Stop reason: HOSPADM

## 2023-04-27 RX ORDER — DEXAMETHASONE SODIUM PHOSPHATE 4 MG/ML
INJECTION, SOLUTION INTRA-ARTICULAR; INTRALESIONAL; INTRAMUSCULAR; INTRAVENOUS; SOFT TISSUE PRN
Status: DISCONTINUED | OUTPATIENT
Start: 2023-04-27 | End: 2023-04-27 | Stop reason: SURG

## 2023-04-27 RX ORDER — ONDANSETRON 2 MG/ML
INJECTION INTRAMUSCULAR; INTRAVENOUS PRN
Status: DISCONTINUED | OUTPATIENT
Start: 2023-04-27 | End: 2023-04-27 | Stop reason: SURG

## 2023-04-27 RX ORDER — MIDAZOLAM HYDROCHLORIDE 1 MG/ML
INJECTION INTRAMUSCULAR; INTRAVENOUS PRN
Status: DISCONTINUED | OUTPATIENT
Start: 2023-04-27 | End: 2023-04-27 | Stop reason: HOSPADM

## 2023-04-27 RX ORDER — OXYCODONE HCL 5 MG/5 ML
10 SOLUTION, ORAL ORAL
Status: DISCONTINUED | OUTPATIENT
Start: 2023-04-27 | End: 2023-04-27 | Stop reason: HOSPADM

## 2023-04-27 RX ORDER — CEFOTETAN DISODIUM 2 G/20ML
INJECTION, POWDER, FOR SOLUTION INTRAMUSCULAR; INTRAVENOUS PRN
Status: DISCONTINUED | OUTPATIENT
Start: 2023-04-27 | End: 2023-04-27 | Stop reason: SURG

## 2023-04-27 RX ORDER — HYDROMORPHONE HYDROCHLORIDE 1 MG/ML
0.2 INJECTION, SOLUTION INTRAMUSCULAR; INTRAVENOUS; SUBCUTANEOUS
Status: DISCONTINUED | OUTPATIENT
Start: 2023-04-27 | End: 2023-04-27 | Stop reason: HOSPADM

## 2023-04-27 RX ORDER — HYDRALAZINE HYDROCHLORIDE 20 MG/ML
5 INJECTION INTRAMUSCULAR; INTRAVENOUS
Status: DISCONTINUED | OUTPATIENT
Start: 2023-04-27 | End: 2023-04-27 | Stop reason: HOSPADM

## 2023-04-27 RX ORDER — SODIUM CHLORIDE, SODIUM LACTATE, POTASSIUM CHLORIDE, CALCIUM CHLORIDE 600; 310; 30; 20 MG/100ML; MG/100ML; MG/100ML; MG/100ML
INJECTION, SOLUTION INTRAVENOUS CONTINUOUS
Status: DISCONTINUED | OUTPATIENT
Start: 2023-04-27 | End: 2023-04-27 | Stop reason: HOSPADM

## 2023-04-27 RX ORDER — HYDROMORPHONE HYDROCHLORIDE 1 MG/ML
0.4 INJECTION, SOLUTION INTRAMUSCULAR; INTRAVENOUS; SUBCUTANEOUS
Status: DISCONTINUED | OUTPATIENT
Start: 2023-04-27 | End: 2023-04-27 | Stop reason: HOSPADM

## 2023-04-27 RX ORDER — KETOROLAC TROMETHAMINE 30 MG/ML
INJECTION, SOLUTION INTRAMUSCULAR; INTRAVENOUS PRN
Status: DISCONTINUED | OUTPATIENT
Start: 2023-04-27 | End: 2023-04-27 | Stop reason: SURG

## 2023-04-27 RX ORDER — HYDROCODONE BITARTRATE AND ACETAMINOPHEN 5; 325 MG/1; MG/1
1 TABLET ORAL EVERY 4 HOURS PRN
Qty: 30 TABLET | Refills: 0 | Status: SHIPPED | OUTPATIENT
Start: 2023-04-27 | End: 2023-05-04

## 2023-04-27 RX ORDER — MIDAZOLAM HYDROCHLORIDE 1 MG/ML
1 INJECTION INTRAMUSCULAR; INTRAVENOUS
Status: DISCONTINUED | OUTPATIENT
Start: 2023-04-27 | End: 2023-04-27 | Stop reason: HOSPADM

## 2023-04-27 RX ORDER — ACETAMINOPHEN 500 MG
1000 TABLET ORAL ONCE
Status: COMPLETED | OUTPATIENT
Start: 2023-04-27 | End: 2023-04-27

## 2023-04-27 RX ORDER — BUPIVACAINE HYDROCHLORIDE AND EPINEPHRINE 5; 5 MG/ML; UG/ML
INJECTION, SOLUTION PERINEURAL
Status: DISCONTINUED | OUTPATIENT
Start: 2023-04-27 | End: 2023-04-27 | Stop reason: HOSPADM

## 2023-04-27 RX ADMIN — MIDAZOLAM HYDROCHLORIDE 1 MG: 1 INJECTION, SOLUTION INTRAMUSCULAR; INTRAVENOUS at 07:03

## 2023-04-27 RX ADMIN — ROCURONIUM BROMIDE 10 MG: 10 INJECTION, SOLUTION INTRAVENOUS at 07:06

## 2023-04-27 RX ADMIN — ROCURONIUM BROMIDE 40 MG: 10 INJECTION, SOLUTION INTRAVENOUS at 07:13

## 2023-04-27 RX ADMIN — PROPOFOL 275 MG: 10 INJECTION, EMULSION INTRAVENOUS at 07:06

## 2023-04-27 RX ADMIN — ONDANSETRON 4 MG: 2 INJECTION INTRAMUSCULAR; INTRAVENOUS at 07:17

## 2023-04-27 RX ADMIN — ONDANSETRON 4 MG: 2 INJECTION INTRAMUSCULAR; INTRAVENOUS at 07:33

## 2023-04-27 RX ADMIN — CEFOTETAN DISODIUM 2 G: 2 INJECTION, POWDER, FOR SOLUTION INTRAMUSCULAR; INTRAVENOUS at 07:09

## 2023-04-27 RX ADMIN — FENTANYL CITRATE 100 MCG: 50 INJECTION, SOLUTION INTRAMUSCULAR; INTRAVENOUS at 07:03

## 2023-04-27 RX ADMIN — PROPOFOL 25 MG: 10 INJECTION, EMULSION INTRAVENOUS at 07:13

## 2023-04-27 RX ADMIN — SODIUM CHLORIDE, POTASSIUM CHLORIDE, SODIUM LACTATE AND CALCIUM CHLORIDE: 600; 310; 30; 20 INJECTION, SOLUTION INTRAVENOUS at 06:23

## 2023-04-27 RX ADMIN — ACETAMINOPHEN 1000 MG: 500 TABLET ORAL at 06:24

## 2023-04-27 RX ADMIN — SUGAMMADEX 200 MG: 100 INJECTION, SOLUTION INTRAVENOUS at 07:35

## 2023-04-27 RX ADMIN — SUCCINYLCHOLINE CHLORIDE 50 MG: 20 INJECTION, SOLUTION INTRAMUSCULAR; INTRAVENOUS; PARENTERAL at 07:06

## 2023-04-27 RX ADMIN — DEXAMETHASONE SODIUM PHOSPHATE 8 MG: 4 INJECTION, SOLUTION INTRA-ARTICULAR; INTRALESIONAL; INTRAMUSCULAR; INTRAVENOUS; SOFT TISSUE at 07:10

## 2023-04-27 RX ADMIN — KETOROLAC TROMETHAMINE 30 MG: 30 INJECTION, SOLUTION INTRAMUSCULAR at 07:33

## 2023-04-27 RX ADMIN — PROPOFOL 25 MG: 10 INJECTION, EMULSION INTRAVENOUS at 07:10

## 2023-04-27 ASSESSMENT — FIBROSIS 4 INDEX: FIB4 SCORE: 1.13

## 2023-04-27 ASSESSMENT — PAIN DESCRIPTION - PAIN TYPE: TYPE: ACUTE PAIN

## 2023-04-27 ASSESSMENT — PAIN SCALES - GENERAL: PAIN_LEVEL: 0

## 2023-04-27 NOTE — ANESTHESIA PROCEDURE NOTES
Airway    Date/Time: 4/27/2023 7:07 AM  Performed by: Jeromy Thomas M.D.  Authorized by: Jeromy Thomas M.D.     Location:  OR  Urgency:  Elective  Indications for Airway Management:  Anesthesia      Spontaneous Ventilation: absent    Sedation Level:  Deep  Preoxygenated: Yes    Patient Position:  Sniffing  Mask Difficulty Assessment:  1 - vent by mask  Final Airway Type:  Endotracheal airway  Final Endotracheal Airway:  ETT  Cuffed: Yes    Technique Used for Successful ETT Placement:  Direct laryngoscopy    Insertion Site:  Oral  Blade Type:  Glide  Laryngoscope Blade/Videolaryngoscope Blade Size:  4  ETT Size (mm):  8.0  Measured from:  Teeth  ETT to Teeth (cm):  24  Placement Verified by: auscultation and capnometry    Cormack-Lehane Classification:  Grade IIa - partial view of glottis  Number of Attempts at Approach:  1  Number of Other Approaches Attempted:  1  Unsuccessful Approach(es) for ETT:  Direct laryngoscopy   MAC 4 - grade 4 view

## 2023-04-27 NOTE — OR NURSING
0738 Pt arrived from OR, report received from anesthesiologist and RN. Pt waking at this time. respirations spontaneous and unlabored. Surgical dressing in place to groin secured by mesh underwear, CDI.     0750 pt more awake, responds to verbal stimuli.     0802 Report to Osmany HANNAH.

## 2023-04-27 NOTE — OR SURGEON
Immediate Post OP Note    PreOp Diagnosis: anal fistula      PostOp Diagnosis: same      Procedure(s):  RECTAL EXAM UNDER ANETHESIA, ANAL FISTULECTOMY  FISTULECTOMY, ANAL    Surgeon(s):  Manny Mccann M.D.    Anesthesiologist/Type of Anesthesia:  Anesthesiologist: Jeromy Thomas M.D./* No anesthesia type entered *    Surgical Staff:  Circulator: Christin Mccormick R.N.  Scrub Person: Brandon Estrada    Specimens removed if any:  * No specimens in log *    Estimated Blood Loss:       Findings:       Complications:           4/27/2023 7:32 AM Manny Mccann M.D.

## 2023-04-27 NOTE — DISCHARGE INSTRUCTIONS
ACTIVITY: Rest and take it easy for the first 24 hours.  A responsible adult is recommended to remain with you during that time.  It is normal to feel sleepy.  We encourage you to not do anything that requires balance, judgment or coordination.    MILD FLU-LIKE SYMPTOMS ARE NORMAL. YOU MAY EXPERIENCE GENERALIZED MUSCLE ACHES, THROAT IRRITATION, HEADACHE AND/OR SOME NAUSEA.    FOR 24 HOURS DO NOT:  Drive, operate machinery or run household appliances.  Drink beer or alcoholic beverages.   Make important decisions or sign legal documents.    SPECIAL INSTRUCTIONS:     DIET: To avoid nausea, slowly advance diet as tolerated, avoiding spicy or greasy foods for the first day.  Add more substantial food to your diet according to your physician's instructions.  INCREASE FLUIDS AND FIBER TO AVOID CONSTIPATION.    SURGICAL DRESSING/BATHING:     FOLLOW-UP APPOINTMENT:  A follow-up appointment should be arranged with your doctor; call to schedule.    You should CALL YOUR PHYSICIAN if you develop:  Fever greater than 101 degrees F.  Pain not relieved by medication, or persistent nausea or vomiting.  Excessive bleeding (blood soaking through dressing) or unexpected drainage from the wound.  Extreme redness or swelling around the incision site, drainage of pus or foul smelling drainage.  Inability to urinate or empty your bladder within 8 hours.    You should call 911 if you develop problems with breathing or chest pain.    If you are unable to contact your doctor or surgical center, you should go to the nearest emergency room or urgent care center.      Physician's telephone #: Dr. Mina  [phone]    If any questions arise, call your doctor.  If your doctor is not available, please feel free to call the Surgical Center at [phone].     A registered nurse may call you a few days after your surgery to see how you are doing after your procedure.    MEDICATIONS: Resume taking daily medication.  Take prescribed pain  medication with food.  If no medication is prescribed, you may take non-aspirin pain medication if needed.  PAIN MEDICATION CAN BE VERY CONSTIPATING.  Take a stool softener or laxative such as senokot, pericolace, or milk of magnesia if needed.    Last pain medication given: None      .    If your physician has prescribed pain medication that includes Acetaminophen (Tylenol), do not take additional Acetaminophen (Tylenol) while taking the prescribed medication.

## 2023-04-27 NOTE — OR NURSING
0802: Report rec'd. Pt awake and alert. Currently denies pain or nausea.    0828: Remains awake, alert, and pain/nausea free. Meets criteria for stage ll.

## 2023-04-27 NOTE — ANESTHESIA POSTPROCEDURE EVALUATION
Patient: Dwight Linares    Procedure Summary     Date: 04/27/23 Room / Location:  OR  / SURGERY [facility]    Anesthesia Start: 0702 Anesthesia Stop: 0742    Procedures:       RECTAL EXAM UNDER ANETHESIA, ANAL FISTULECTOMY      FISTULECTOMY, ANAL Diagnosis: (ANAL FISTULA)    Surgeons: Manny Mccann M.D. Responsible Provider: Jeromy Thomas M.D.    Anesthesia Type: general ASA Status: 2          Final Anesthesia Type: general  Last vitals  BP   Blood Pressure: 135/67    Temp   36.6 °C (97.9 °F)    Pulse   92   Resp   14    SpO2   98 %      Anesthesia Post Evaluation    Patient location during evaluation: PACU  Patient participation: complete - patient participated  Level of consciousness: awake and alert  Pain score: 0    Airway patency: patent  Anesthetic complications: no  Cardiovascular status: hemodynamically stable  Respiratory status: acceptable  Hydration status: euvolemic    PONV: none          No notable events documented.     Nurse Pain Score: 3 (NPRS)

## 2023-04-27 NOTE — OP REPORT
DATE OF SERVICE:  04/27/2023     OPERATING SURGEON:  Manny Mccann MD.     PROCEDURE:  Examination under anesthesia and anal fistulotomy.     ANESTHESIA:  General.     ESTIMATED BLOOD LOSS:  Less than 5 mL     PREOPERATIVE DIAGNOSIS:  Recurrent perirectal abscess.     POSTOPERATIVE DIAGNOSIS:  Anal fistula.     HISTORY:  A 57-year-old male who comes in with a history of multiple   perirectal abscesses and was noted to have a small sinus tract at about the 7   o'clock position with the patient prone.  At this time, he is taken to the   operating room for correction of a presumed anal fistula.     DESCRIPTION OF PROCEDURE:  The patient was taken to the operating room and   satisfactory general anesthesia was induced by endotracheal intubation.  The   patient was placed in jackknife position and the buttocks were taped apart.    The patient was then prepped and draped.  A probe was then passed through the   sinus tract after the anoscope was inserted and this demonstrated a tract   straight from the probe site to the dentate line.  This was then opened up   with the cautery and a small amount of purulence was encountered at the level   of the dentate line.  No muscles were cut.     The wound was then fulgurated, and local was instilled.  Silvadene was placed   on the open wound and a dressing applied.  The patient tolerated the procedure   well.  No specimens were sent to pathology.           ______________________________  MD SOUMYA PAIZ/SHAJI    DD:  04/27/2023 07:34  DT:  04/27/2023 08:06    Job#:  [id number]

## 2023-04-27 NOTE — OR NURSING
Pt discharged home to the care of his wife. IV discontinued and gauze placed, pt in possession of belongings. Pt provided discharge education and information pertaining to medications and follow up appointments. Pt received copy of discharge instructions and verbalized understanding. /84   Pulse 89   Temp 36 °C (96.8 °F) (Temporal)   Resp 18   Ht 1.829 m (6')   Wt (!) 126 kg (277 lb 12.5 oz)   SpO2 97%   BMI 37.67 kg/m²  Taken out via wheelchair to private vehicle by CNA.

## 2023-04-27 NOTE — ANESTHESIA PREPROCEDURE EVALUATION
Case: [id number] Date/Time: 04/27/23 0645    Procedures:       RECTAL EXAM UNDER ANETHESIA, ANAL FISTULECTOMY      FISTULECTOMY, ANAL    Pre-op diagnosis: ANAL FISTULA    Location: [facility] OR 01 / SURGERY [facility]    Surgeons: Manny Mccann M.D.      56 yo male with medical problems    P Med Hx:   Hypertension  Anxiety  Depression  Hyperlipidemia  High cholesterol  Pain    P Surg Hx:  Mouth surg as child  Dental   Colonoscopy  NO reported problems with previous anesthesia    + Cigar some  Occ EtOH use    NPO    Relevant Problems   CARDIAC   (positive) Hypertension       Physical Exam    Airway   Mallampati: IV  TM distance: >3 FB  Neck ROM: full       Cardiovascular - normal exam  Rhythm: regular  Rate: normal  (-) murmur     Dental - normal exam           Pulmonary - normal exam  Breath sounds clear to auscultation     Abdominal   (+) obese     Neurological - normal exam               Anesthesia Plan    ASA 2       Plan - general       Airway plan will be ETT          Induction: intravenous    Postoperative Plan: Postoperative administration of opioids is intended.    Pertinent diagnostic labs and testing reviewed    Informed Consent:    Anesthetic plan and risks discussed with patient.    Use of blood products discussed with: patient whom consented to blood products.

## 2023-04-27 NOTE — ANESTHESIA TIME REPORT
Anesthesia Start and Stop Event Times     Date Time Event    4/27/2023 0639 Ready for Procedure     0702 Anesthesia Start     0742 Anesthesia Stop        Responsible Staff  04/27/23    Name Role Begin End    Jeromy Thomas M.D. Anesth 0702 0742        Overtime Reason:  no overtime (within assigned shift)    Comments:

## 2023-05-12 ENCOUNTER — APPOINTMENT (OUTPATIENT)
Dept: BEHAVIORAL HEALTH | Facility: CLINIC | Age: 58
End: 2023-05-12
Payer: OTHER GOVERNMENT

## 2023-05-16 ENCOUNTER — OFFICE VISIT (OUTPATIENT)
Dept: MEDICAL GROUP | Facility: PHYSICIAN GROUP | Age: 58
End: 2023-05-16
Payer: OTHER GOVERNMENT

## 2023-05-16 ENCOUNTER — HOSPITAL ENCOUNTER (OUTPATIENT)
Dept: LAB | Facility: MEDICAL CENTER | Age: 58
End: 2023-05-16
Attending: NURSE PRACTITIONER
Payer: OTHER GOVERNMENT

## 2023-05-16 VITALS
WEIGHT: 272 LBS | TEMPERATURE: 97.8 F | OXYGEN SATURATION: 97 % | RESPIRATION RATE: 18 BRPM | BODY MASS INDEX: 33.12 KG/M2 | HEIGHT: 76 IN | DIASTOLIC BLOOD PRESSURE: 76 MMHG | HEART RATE: 86 BPM | SYSTOLIC BLOOD PRESSURE: 132 MMHG

## 2023-05-16 DIAGNOSIS — E78.2 MIXED HYPERLIPIDEMIA: ICD-10-CM

## 2023-05-16 DIAGNOSIS — E66.9 OBESITY (BMI 30-39.9): ICD-10-CM

## 2023-05-16 DIAGNOSIS — E78.5 HYPERLIPIDEMIA, UNSPECIFIED HYPERLIPIDEMIA TYPE: ICD-10-CM

## 2023-05-16 DIAGNOSIS — R79.89 ELEVATED LIVER FUNCTION TESTS: ICD-10-CM

## 2023-05-16 DIAGNOSIS — I10 PRIMARY HYPERTENSION: Chronic | ICD-10-CM

## 2023-05-16 DIAGNOSIS — L98.9 SKIN LESION: ICD-10-CM

## 2023-05-16 LAB
ALBUMIN SERPL BCP-MCNC: 4.2 G/DL (ref 3.2–4.9)
ALBUMIN/GLOB SERPL: 1.4 G/DL
ALP SERPL-CCNC: 127 U/L (ref 30–99)
ALT SERPL-CCNC: 15 U/L (ref 2–50)
ANION GAP SERPL CALC-SCNC: 10 MMOL/L (ref 7–16)
AST SERPL-CCNC: 22 U/L (ref 12–45)
BILIRUB SERPL-MCNC: 0.3 MG/DL (ref 0.1–1.5)
BUN SERPL-MCNC: 19 MG/DL (ref 8–22)
CALCIUM ALBUM COR SERPL-MCNC: 8.7 MG/DL (ref 8.5–10.5)
CALCIUM SERPL-MCNC: 8.9 MG/DL (ref 8.5–10.5)
CHLORIDE SERPL-SCNC: 102 MMOL/L (ref 96–112)
CHOLEST SERPL-MCNC: 122 MG/DL (ref 100–199)
CO2 SERPL-SCNC: 26 MMOL/L (ref 20–33)
CREAT SERPL-MCNC: 0.92 MG/DL (ref 0.5–1.4)
FASTING STATUS PATIENT QL REPORTED: NORMAL
GFR SERPLBLD CREATININE-BSD FMLA CKD-EPI: 96 ML/MIN/1.73 M 2
GLOBULIN SER CALC-MCNC: 3 G/DL (ref 1.9–3.5)
GLUCOSE SERPL-MCNC: 89 MG/DL (ref 65–99)
HDLC SERPL-MCNC: 31 MG/DL
LDLC SERPL CALC-MCNC: 75 MG/DL
POTASSIUM SERPL-SCNC: 3.9 MMOL/L (ref 3.6–5.5)
PROT SERPL-MCNC: 7.2 G/DL (ref 6–8.2)
SODIUM SERPL-SCNC: 138 MMOL/L (ref 135–145)
TRIGL SERPL-MCNC: 79 MG/DL (ref 0–149)

## 2023-05-16 PROCEDURE — 80061 LIPID PANEL: CPT

## 2023-05-16 PROCEDURE — 80053 COMPREHEN METABOLIC PANEL: CPT

## 2023-05-16 PROCEDURE — 99214 OFFICE O/P EST MOD 30 MIN: CPT | Performed by: NURSE PRACTITIONER

## 2023-05-16 PROCEDURE — 3078F DIAST BP <80 MM HG: CPT | Performed by: NURSE PRACTITIONER

## 2023-05-16 PROCEDURE — 3075F SYST BP GE 130 - 139MM HG: CPT | Performed by: NURSE PRACTITIONER

## 2023-05-16 PROCEDURE — 36415 COLL VENOUS BLD VENIPUNCTURE: CPT

## 2023-05-16 ASSESSMENT — ENCOUNTER SYMPTOMS
PALPITATIONS: 0
CONSTITUTIONAL NEGATIVE: 1
PSYCHIATRIC NEGATIVE: 1
EYES NEGATIVE: 1
FEVER: 0
NEUROLOGICAL NEGATIVE: 1
COUGH: 0
GASTROINTESTINAL NEGATIVE: 1
SHORTNESS OF BREATH: 0
MUSCULOSKELETAL NEGATIVE: 1
SPUTUM PRODUCTION: 0

## 2023-05-16 ASSESSMENT — FIBROSIS 4 INDEX: FIB4 SCORE: 1.13

## 2023-05-16 NOTE — PROGRESS NOTES
Subjective       CC:   Chief Complaint   Patient presents with    Lab Results     Just had labs done this morning         HPI:   Patient is a 57 y.o. established male patient with medical history listed below here today for evaluation and management of hypertension, hyperlipidemia, e\levated LFT. We reduced Rosuvastatin in 8/2022 due to chronically elevated Alk phosphatase & improving cholesterol panel. He had his repeat labs done this morning, will [facility]  We referred him to gen surg at last visit for left buttock abscess. He underwent fistulectomy on 4/27/2023 with Dr Mccann.   He is requesting renewal of derm referral to see Brandon Bush in [place] for annual skin check. [facility] requires new referral every 6-12 months    Problem   Elevated Liver Function Tests     Latest Reference Range & Units 10/31/20 07:40 04/15/21 10:20 07/07/22 07:37 04/20/23 15:34   AST(SGOT) 12 - 45 U/L 33 33 28 25   ALT(SGPT) 2 - 50 U/L 32 29 23 22   Alkaline Phosphatase 30 - 99 U/L 136 (H) 126 (H) 120 (H) 131 (H)   Total Bilirubin 0.1 - 1.5 mg/dL 0.3 0.3 0.3 0.2   Direct Bilirubin 0.1 - 0.5 mg/dL <0.2      Indirect Bilirubin 0.0 - 1.0 mg/dL see below      Albumin 3.2 - 4.9 g/dL 4.4 4.1 4.3 4.4   Total Protein 6.0 - 8.2 g/dL 7.3 7.1 7.3 7.6        Hypertension    Taking Lisinopril-HCTZ 20-25mg QD       Hyperlipidemia    Taking Rosuvastatin 10mg QD           Patient Active Problem List   Diagnosis    Hypertension    Skin lesion    Chronic low back pain    Hyperlipidemia    Environmental allergies    Obesity (BMI 30-39.9)    Eczema    Family history of prostate cancer in father    Erectile dysfunction    Toenail fungus    Elevated liver function tests    Cough    Rectal lump    Cerumen debris on tympanic membrane of both ears    Anxiety    Major depressive disorder    Elevated fasting glucose    Problems in relationship with spouse or partner    Grief counseling    Acute pain of right shoulder    Lower  "urinary tract symptoms (LUTS)       Past Medical History:   Diagnosis Date    Anxiety     Depression     High cholesterol     Hyperlipidemia     Hypertension 10/14/2014    Pain     low back pain        Past Surgical History:   Procedure Laterality Date    [facility] SURG DIAGNOSTIC EXAM, ANORECTAL N/A 4/27/2023    Procedure: RECTAL EXAM UNDER ANETHESIA, ANAL FISTULECTOMY;  Surgeon: Manny Mccann M.D.;  Location: SURGERY [facility];  Service: General    COLONOSCOPY      x2    DENTAL SURGERY      OTHER  Ink    Mouth Surgery when i was a child        Current Outpatient Medications on File Prior to Visit   Medication Sig Dispense Refill    cetirizine (ZYRTEC) 10 MG Tab Take 10 mg by mouth every day.      clindamycin (CLEOCIN) 1 % Solution APPLY TO SCALP TWICE DAILY      sildenafil citrate (VIAGRA) 50 MG tablet Take 1 Tablet by mouth 1 time a day as needed for Erectile Dysfunction. 10 Tablet 1    lisinopril-hydrochlorothiazide (PRINZIDE) 20-25 MG per tablet Take 1 Tablet by mouth every day. 90 Tablet 3    rosuvastatin (CRESTOR) 5 MG Tab Take 1 Tablet by mouth every evening. 90 Tablet 3    Aug Betamethasone Dipropionate (DIPROLENE-AF) 0.05 % Cream        No current facility-administered medications on file prior to visit.        Health Maintenance: Completed    ROS:  Review of Systems   Constitutional: Negative.  Negative for fever and malaise/fatigue.   HENT: Negative.     Eyes: Negative.    Respiratory:  Negative for cough, sputum production and shortness of breath.    Cardiovascular:  Negative for chest pain, palpitations and leg swelling.   Gastrointestinal: Negative.    Genitourinary: Negative.    Musculoskeletal: Negative.    Neurological: Negative.    Endo/Heme/Allergies: Negative.    Psychiatric/Behavioral: Negative.         Objective       Exam:  /76   Pulse 86   Temp 36.6 °C (97.8 °F)   Resp 18   Ht 1.918 m (6' 3.5\")   Wt 123 kg (272 lb)   SpO2 97%   BMI 33.55 kg/m²  Body mass index is 33.55 " kg/m².    Physical Exam  Constitutional:       Appearance: Normal appearance.   Cardiovascular:      Rate and Rhythm: Normal rate and regular rhythm.      Pulses: Normal pulses.      Heart sounds: Normal heart sounds.   Pulmonary:      Effort: Pulmonary effort is normal.      Breath sounds: Normal breath sounds.   Musculoskeletal:      Right lower leg: No edema.      Left lower leg: No edema.   Skin:     General: Skin is warm and dry.   Neurological:      Mental Status: He is alert.       Assessment & Plan       57 y.o. male with the following -     Problem List Items Addressed This Visit       Hypertension (Chronic)     Chronic; goal BP < 130/80  BP in clinic today 132/76; has not taken medication this morning. no CP, dizziness, palpitations  - continue Lisinopril-HCTZ 20-25mg QD  - he will get home BP cuff & keep weekly log; we plan to start reducing BP meds as he gets active & BP gets lower  - reviewed CMP results at last visit; slightly elevated BUN, he is hydrating; ALP still elevated; we reduced rosuvastatin in 8/2022 & he is due for 6 month recheck; he got labs done today.            Hyperlipidemia (Chronic)      Latest Reference Range & Units 7/7/22 07:37   Cholesterol,Tot 100 - 199 mg/dL 125   Triglycerides 0 - 149 mg/dL 81   HDL >=40 mg/dL 37 !   LDL <100 mg/dL 72   - ALP remains elevated, although improving from last year  - he is watching intake of fried/fatty foods  - reduced rosuvastatin to 5mg (from 10mg) in 8/2022;   - recheck CMP/fasting lipid done today; can d/c rosuvastatin if LDL/TC within range           Skin lesion    Relevant Orders    Referral to Dermatology    Obesity (BMI 30-39.9)    Relevant Orders    Patient identified as having weight management issue.  Appropriate orders and counseling given.    Elevated liver function tests     Chronically elevated ALP elevated; AST/ALT in normal range. No abdominal pain or jaundice, no bone pain reported  Lipid panel is WNL  We reduced dosing of  rosuvastatin 10mg-->5mg in 8/2023  - 6 month recheck fasting lipid collected today; will f-up via [employer]. If still within range can d/c rosuvastatin            Educated in proper administration of medication(s) ordered today including safety, possible SE, risks, benefits, rationale and alternatives to therapy.     Return in about 3 months (around 8/16/2023) for htn, hyperlipidemia, elevated ALP.    Please note that this dictation was created using voice recognition software. I have made every reasonable attempt to correct obvious errors, but I expect that there are errors of grammar and possibly content that I did not discover before finalizing the note.

## 2023-05-16 NOTE — ASSESSMENT & PLAN NOTE
Latest Reference Range & Units 7/7/22 07:37   Cholesterol,Tot 100 - 199 mg/dL 125   Triglycerides 0 - 149 mg/dL 81   HDL >=40 mg/dL 37 !   LDL <100 mg/dL 72     - ALP remains elevated, although improving from last year  - he is watching intake of fried/fatty foods  - reduced rosuvastatin to 5mg (from 10mg) in 8/2022;   - recheck CMP/fasting lipid done today; can d/c rosuvastatin if LDL/TC within range

## 2023-05-16 NOTE — ASSESSMENT & PLAN NOTE
Chronic; goal BP < 130/80  BP in clinic today 132/76; has not taken medication this morning. no CP, dizziness, palpitations  - continue Lisinopril-HCTZ 20-25mg QD  - he will get home BP cuff & keep weekly log; we plan to start reducing BP meds as he gets active & BP gets lower  - reviewed CMP results at last visit; slightly elevated BUN, he is hydrating; ALP still elevated; we reduced rosuvastatin in 8/2022 & he is due for 6 month recheck; he got labs done today.

## 2023-05-16 NOTE — ASSESSMENT & PLAN NOTE
Chronically elevated ALP elevated; AST/ALT in normal range. No abdominal pain or jaundice, no bone pain reported  Lipid panel is WNL  We reduced dosing of rosuvastatin 10mg-->5mg in 8/2023  - 6 month recheck fasting lipid collected today; will f-up via [employer] [facility]. If still within range can d/c rosuvastatin

## 2023-05-17 ENCOUNTER — OFFICE VISIT (OUTPATIENT)
Dept: BEHAVIORAL HEALTH | Facility: CLINIC | Age: 58
End: 2023-05-17
Payer: OTHER GOVERNMENT

## 2023-05-17 DIAGNOSIS — F32.5 MAJOR DEPRESSIVE DISORDER WITH SINGLE EPISODE, IN FULL REMISSION (HCC): ICD-10-CM

## 2023-05-17 PROCEDURE — 99213 OFFICE O/P EST LOW 20 MIN: CPT | Performed by: PSYCHIATRY & NEUROLOGY

## 2023-05-17 NOTE — PROGRESS NOTES
"  PSYCHIATRY FOLLOW-UP NOTE      Name: Dwight Linares  MRN: [id number]  : 1965  Age: 57 y.o.  Date of assessment: 23  PCP: Celia Guo D.N.P.  Persons in attendance: Patient      REASON FOR VISIT/CHIEF COMPLAINT (as stated by Patient):  Dwight Linares is a 57 y.o., White male, attending follow-up appointment for anxiety management.      SUBJECTIVE/HPI  Dwight Linares is a 57 y.o. old male with anxiety who comes in today for follow up. Patient was last seen on 3/10/23, at which time the plan was to: reduce paroxetine from 20mg to 10mg then stop, cover with 8 day course of prozac.    Patient followed instruction of taper from paxil with cover with prozac. Experienced no adverse effects while stopping paxil. He denies issues with brain fog, zaps, or other. Reports that he has noticed some return of function with respect to his erectile dysfunction that he was concerned was being caused by medication, but it is not where he would like it to be. He is happy that he is off medication. He denies change or deterioration in mood, denies increase or worsening of anxiety.  Is having relationship issues with his wife, understands that this is likely a large contributing factor to ongoing erectile issues.  He is thinking that he is going to get a divorce from her.  He would like to live the rest of his life without any women in his life.  He has been  6 times \"that is enough for me\".  Patient is following up consistently with primary care provider, has goal to lose weight to reduce amount of other medications that he is taking.    CURRENT MEDICATIONS:  Current Outpatient Medications   Medication Sig Dispense Refill    cetirizine (ZYRTEC) 10 MG Tab Take 10 mg by mouth every day.      clindamycin (CLEOCIN) 1 % Solution APPLY TO SCALP TWICE DAILY      sildenafil citrate (VIAGRA) 50 MG tablet Take 1 Tablet by mouth 1 time a day as needed for Erectile Dysfunction. 10 Tablet 1    lisinopril-hydrochlorothiazide " (PRINZIDE) 20-25 MG per tablet Take 1 Tablet by mouth every day. 90 Tablet 3    rosuvastatin (CRESTOR) 5 MG Tab Take 1 Tablet by mouth every evening. 90 Tablet 3    Aug Betamethasone Dipropionate (DIPROLENE-AF) 0.05 % Cream        No current facility-administered medications for this visit.       MEDICAL HISTORY  Past Medical History:   Diagnosis Date    Anxiety     Depression     High cholesterol     Hyperlipidemia     Hypertension 10/14/2014    Pain     low back pain     Past Surgical History:   Procedure Laterality Date    [facility] SURG DIAGNOSTIC EXAM, ANORECTAL N/A 4/27/2023    Procedure: RECTAL EXAM UNDER ANETHESIA, ANAL FISTULECTOMY;  Surgeon: Manny Mccann M.D.;  Location: SURGERY [facility];  Service: General    COLONOSCOPY      x2    DENTAL SURGERY      OTHER  Ink    Mouth Surgery when i was a child       ALLERGIES:  Patient has no known allergies.     PAST PSYCHIATRIC HISTORY  Prior psychiatric hospitalization: No  Prior Self harm/suicide attempt: No  Prior Diagnosis: Anxiety, depression     PAST PSYCHIATRIC MEDICATIONS  Paroxetine 40 mg, effective in treating anxiety, experience significant sexual dysfunction (decreased libido, erectile dysfunction)     FAMILY HISTORY  Psychiatric diagnosis: Denies   History of suicide attempts: Denies  Substance abuse history: Denies     SUBSTANCE USE HISTORY:  ALCOHOL: Drinks 4-5 times a year  TOBACCO: Smokes a cigar occasionally  CANNABIS: No  OPIOIDS: No  PRESCRIPTION MEDICATIONS: No  OTHERS: Denies  History of inpatient/outpatient rehab treatment: No        SOCIAL HISTORY  Childhood: born in [place]  Education: High school graduate  in Special Education: No  Intellectual Disability: No  Employment: Served in the [employer] for 22 years, is now a   Relationship: Has been  6 times, this is the second time he has been  to his current wife, no children with current wife  Kids: Has 2 grown sons who live in [place]  Current living situation:  Lives in [place] with his wife  Current/past legal issues: None   History: 22 years in the [employer]  Spiritual/Episcopal affiliation: Did not discuss      REVIEW OF SYSTEMS:        Constitutional negative   Eyes negative   Ears/Nose/Mouth/Throat negative   Cardiovascular negative   Respiratory negative   Gastrointestinal negative   Genitourinary Erectile dysfunction   Muscular negative   Integumentary negative   Neurological negative   Endocrine negative   Hematologic/Lymphatic negative     PHYSICAL EXAMINATION:  Vital signs: There were no vitals taken for this visit.  Musculoskeletal: Normal gait.   Abnormal movements: no      MENTAL STATUS EXAMINATION      General:   - Grooming and hygiene: Adequate and Casual,   - Apparent distress: no,   - Behavior: Calm  - Eye Contact:  Good,   - no psychomotor agitation or retardation    - Participation: Active verbal participation  Orientation: Alert and Fully Oriented to person, place and time  Mood: Euthymic  Affect: Flexible and Congruent with content,  Thought Process: Logical and Goal-directed  Thought Content: Denies suicidal or homicidal ideations, intent or plan Within normal limits  Perception: Denies auditory or visual hallucinations. No delusions noted Within normal limits  Attention span and concentration: Intact   Speech:Rate within normal limits and Volume within normal limits  Language: Appropriate   Insight: Good  Judgment: Good  Recent and remote memory: No gross evidence of memory deficits        DEPRESSION SCREENIN/11/2022     2:20 PM 11/15/2022     2:30 PM 3/9/2023     9:40 AM   Depression Screen (PHQ-2/PHQ-9)   PHQ-2 Total Score 0 2 0   PHQ-9 Total Score  10        Interpretation of PHQ-9 Total Score   Score Severity   1-4 No Depression   5-9 Mild Depression   10-14 Moderate Depression   15-19 Moderately Severe Depression   20-27 Severe Depression    CURRENT RISK:       Suicidal: Low       Homicidal: Low       Self-Harm: Low        Relapse: Not applicable       Crisis Safety Plan Reviewed Not Indicated       If evidence of imminent risk is present, intervention/plan:      MEDICAL RECORDS/LABS/DIAGNOSTIC TESTS REVIEWED:  No new lab since last visit     [place]  records -   Reviewed       ASSESSMENT:  57 year old male history of anxiety and depression who was successfully tapered from paroxetine after our last visit with 8-day cover of Prozac.  No issues or adverse events while coming off of paroxetine.  No depression or increase in anxiety since stopping medication.  Ongoing sexual dysfunction with related erectile dysfunction issues.  No need for follow-up in this clinic, patient can follow up as needed if depression returns or anxiety increases or would like to have further mental health resources available to him.  Patient is agreeable with this plan and verbalized acknowledgment understanding.    DDX:  1.  Generalized anxiety disorder  2. Major depressive disorder, in remission    PLAN:  (1) medications not appropriate at this time, patient may return to clinic for future assistance if desired.  (2) provided resources and information if patient should like to return to this clinic, also provided resources within the local community that he may seek for help if so desired in the future      Medication options, alternatives (including no medications) and medication risks/benefits/side effects were discussed in detail.  The patient was advised to call, message provider on [employer], or come in to the clinic if symptoms worsen or if any future questions/issues regarding their medications arise; the patient verbalized understanding and agreement.  The patient was educated to call 911, call the suicide hotline, or go to local ER if having thoughts of suicide or homicide; verbalized understanding.      Return to clinic as needed  Next Appointment: instruction provided on how to make the next appointment.     The proposed treatment plan was discussed  with the patient who was provided the opportunity to ask questions and make suggestions regarding alternative treatment. Patient verbalized understanding and expressed agreement with the plan.       Garret Gallagher D.O.  05/17/23    This note was created using voice recognition software (Dragon). The accuracy of the dictation is limited by the abilities of the software. I have reviewed the note prior to signing, however some errors in grammar and context are still possible. If you have any questions related to this note please do not hesitate to contact our office.

## 2023-07-27 DIAGNOSIS — E78.5 HYPERLIPIDEMIA, UNSPECIFIED HYPERLIPIDEMIA TYPE: ICD-10-CM

## 2023-07-27 RX ORDER — ROSUVASTATIN CALCIUM 5 MG/1
5 TABLET, COATED ORAL EVERY EVENING
Qty: 90 TABLET | Refills: 3 | Status: SHIPPED | OUTPATIENT
Start: 2023-07-27 | End: 2023-12-06

## 2023-12-06 ENCOUNTER — OFFICE VISIT (OUTPATIENT)
Dept: MEDICAL GROUP | Facility: PHYSICIAN GROUP | Age: 58
End: 2023-12-06
Payer: OTHER GOVERNMENT

## 2023-12-06 VITALS
BODY MASS INDEX: 33.32 KG/M2 | WEIGHT: 268 LBS | OXYGEN SATURATION: 96 % | SYSTOLIC BLOOD PRESSURE: 120 MMHG | TEMPERATURE: 96.8 F | HEIGHT: 75 IN | DIASTOLIC BLOOD PRESSURE: 60 MMHG | RESPIRATION RATE: 18 BRPM | HEART RATE: 79 BPM

## 2023-12-06 DIAGNOSIS — E78.2 MIXED HYPERLIPIDEMIA: Chronic | ICD-10-CM

## 2023-12-06 DIAGNOSIS — K59.00 CONSTIPATION, UNSPECIFIED CONSTIPATION TYPE: ICD-10-CM

## 2023-12-06 DIAGNOSIS — I10 PRIMARY HYPERTENSION: Chronic | ICD-10-CM

## 2023-12-06 PROCEDURE — 99214 OFFICE O/P EST MOD 30 MIN: CPT | Performed by: NURSE PRACTITIONER

## 2023-12-06 PROCEDURE — 3074F SYST BP LT 130 MM HG: CPT | Performed by: NURSE PRACTITIONER

## 2023-12-06 PROCEDURE — 3078F DIAST BP <80 MM HG: CPT | Performed by: NURSE PRACTITIONER

## 2023-12-06 RX ORDER — KETOCONAZOLE 20 MG/ML
SHAMPOO TOPICAL
COMMUNITY
Start: 2023-11-16 | End: 2023-12-06

## 2023-12-06 RX ORDER — DESONIDE 0.5 MG/ML
LOTION TOPICAL
COMMUNITY
Start: 2023-11-21

## 2023-12-06 ASSESSMENT — FIBROSIS 4 INDEX: FIB4 SCORE: 1.23

## 2023-12-06 NOTE — ASSESSMENT & PLAN NOTE
Chronic; goal BP < 130/80  BP in clinic today 132/76; no CP, dizziness, palpitations, edema  - continue Lisinopril-HCTZ 20-25mg QD  - he will get home BP cuff & keep weekly log; we plan to start reducing BP meds as he gets active & BP gets lower  - reviewed CMP results at last visit; slightly elevated BUN, he is hydrating; ALP still elevated; we reduced rosuvastatin in 8/2022 with some improvement in levels & then d/ponce statin in 5/2023

## 2023-12-06 NOTE — ASSESSMENT & PLAN NOTE
Reports intermittent constipation over the years, more so the past week. BM feels pasty most days and he is not emptying out completely. No abdominal discomfort, he feels 'gas pain' in his lower back sometimes. No blood in stool. No hematuria, dysuria. Hypoactive bowel sounds with rounded abdomen today. He will incorporate daily fiber supplement into diet. Can continue with prn miralax. Adequate hydration each day.

## 2023-12-06 NOTE — PROGRESS NOTES
Subjective       CC:   Chief Complaint   Patient presents with    Hypertension Follow-up    Constipation     Tried stool softenrs for one week really hard BM, pain in abdomen lower back        HPI:   Patient is a 58 y.o. established male patient with medical history listed below here today for evaluation and management of hypertension, stable on currently lisinopril-HCTZ.    New concern today - constipated the past week. BM pasty sometimes, does not feel like he is emptying out. He has tried miralax with some relief. No blood in stool. No hematuria, dysuria. Denies abdominal pain.     Problem   Constipation   Hypertension    Taking Lisinopril-HCTZ 20-25mg QD     Hyperlipidemia    Taking Rosuvastatin 10mg QD         Patient Active Problem List   Diagnosis    Hypertension    Skin lesion    Chronic low back pain    Hyperlipidemia    Environmental allergies    Obesity (BMI 30-39.9)    Eczema    Family history of prostate cancer in father    Erectile dysfunction    Toenail fungus    Elevated liver function tests    Cough    Rectal lump    Cerumen debris on tympanic membrane of both ears    Anxiety    Major depressive disorder in full remission (HCC)    Elevated fasting glucose    Problems in relationship with spouse or partner    Grief counseling    Acute pain of right shoulder    Lower urinary tract symptoms (LUTS)    Constipation       Past Medical History:   Diagnosis Date    Anxiety     Constipation 12/6/2023    Depression     High cholesterol     Hyperlipidemia     Hypertension 10/14/2014    Pain     low back pain        Past Surgical History:   Procedure Laterality Date    [place] SURG DIAGNOSTIC EXAM, ANORECTAL N/A 4/27/2023    Procedure: RECTAL EXAM UNDER ANETHESIA, ANAL FISTULECTOMY;  Surgeon: Manny Mccann M.D.;  Location: SURGERY [facility];  Service: General    COLONOSCOPY      x2    DENTAL SURGERY      OTHER  Ink    Mouth Surgery when i was a child        Current Outpatient Medications on File Prior to  "Visit   Medication Sig Dispense Refill    desonide (DESOWEN) 0.05 % lotion APPLY TO AFFECTED AREAS TWICE DAILY FOR 14 DAYS      cetirizine (ZYRTEC) 10 MG Tab Take 10 mg by mouth every day.      clindamycin (CLEOCIN) 1 % Solution APPLY TO SCALP TWICE DAILY      sildenafil citrate (VIAGRA) 50 MG tablet Take 1 Tablet by mouth 1 time a day as needed for Erectile Dysfunction. 10 Tablet 1    lisinopril-hydrochlorothiazide (PRINZIDE) 20-25 MG per tablet Take 1 Tablet by mouth every day. 90 Tablet 3    Aug Betamethasone Dipropionate (DIPROLENE-AF) 0.05 % Cream        No current facility-administered medications on file prior to visit.        Health Maintenance: Completed    ROS:  Review of Systems   Constitutional: Negative.  Negative for fever and malaise/fatigue.   Respiratory:  Negative for cough, sputum production and shortness of breath.    Cardiovascular:  Negative for chest pain, palpitations and leg swelling.   Gastrointestinal:  Positive for constipation.   Genitourinary: Negative.    Musculoskeletal: Negative.    Neurological: Negative.    Endo/Heme/Allergies: Negative.        Objective       Exam:  /60   Pulse 79   Temp 36 °C (96.8 °F) (Temporal)   Resp 18   Ht 1.905 m (6' 3\")   Wt 122 kg (268 lb)   SpO2 96%   BMI 33.50 kg/m²  Body mass index is 33.5 kg/m².    Physical Exam  Constitutional:       Appearance: Normal appearance.   Cardiovascular:      Rate and Rhythm: Normal rate and regular rhythm.      Pulses: Normal pulses.      Heart sounds: Normal heart sounds.   Pulmonary:      Effort: Pulmonary effort is normal.      Breath sounds: Normal breath sounds.   Abdominal:      General: Bowel sounds are normal.      Palpations: Abdomen is soft.      Tenderness: There is no abdominal tenderness. There is no right CVA tenderness, left CVA tenderness or rebound.      Comments: rounded   Musculoskeletal:      Right lower leg: No edema.      Left lower leg: No edema.   Skin:     General: Skin is warm and " dry.   Neurological:      Mental Status: He is alert.         Assessment & Plan       58 y.o. male with the following -     Problem List Items Addressed This Visit       Hypertension (Chronic)     Chronic; goal BP < 130/80  BP in clinic today 132/76; no CP, dizziness, palpitations, edema  - continue Lisinopril-HCTZ 20-25mg QD  - he will get home BP cuff & keep weekly log; we plan to start reducing BP meds as he gets active & BP gets lower  - reviewed CMP results at last visit; slightly elevated BUN, he is hydrating; ALP still elevated; we reduced rosuvastatin in 8/2022 with some improvement in levels & then d/ponce statin in 5/2023         Relevant Orders    Comp Metabolic Panel    Lipid Profile    Hyperlipidemia (Chronic)      Latest Reference Range & Units 07/07/22 07:37 05/16/23 07:05   Cholesterol,Tot 100 - 199 mg/dL 125 122   Triglycerides 0 - 149 mg/dL 81 79   HDL >=40 mg/dL 37 ! 31 !   LDL <100 mg/dL 72 75   Chronic; LDL goal < 100; hx hypertension; had elevated ALP, LDL remained in normal range after we reduced rosuvastatin in 8/2022 so d/ponce in 5/2023  - ALP remains elevated, although improving from last year  - he is watching intake of fried/fatty foods  - recheck CMP/fasting lipid with upcoming labs         Relevant Orders    Comp Metabolic Panel    Lipid Profile    Constipation     Reports intermittent constipation over the years, more so the past week. BM feels pasty most days and he is not emptying out completely. No abdominal discomfort, he feels 'gas pain' in his lower back sometimes. No blood in stool. No hematuria, dysuria. Hypoactive bowel sounds with rounded abdomen today. He will incorporate daily fiber supplement into diet. Can continue with prn miralax. Adequate hydration each day.           Educated in proper administration of medication(s) ordered today including safety, possible SE, risks, benefits, rationale and alternatives to therapy.     Return in about 3 months (around 3/6/2024) for  constipation, hyperlipidemia.    Please note that this dictation was created using voice recognition software. I have made every reasonable attempt to correct obvious errors, but I expect that there are errors of grammar and possibly content that I did not discover before finalizing the note.

## 2023-12-06 NOTE — ASSESSMENT & PLAN NOTE
Latest Reference Range & Units 07/07/22 07:37 05/16/23 07:05   Cholesterol,Tot 100 - 199 mg/dL 125 122   Triglycerides 0 - 149 mg/dL 81 79   HDL >=40 mg/dL 37 ! 31 !   LDL <100 mg/dL 72 75     Chronic; LDL goal < 100; hx hypertension; had elevated ALP, LDL remained in normal range after we reduced rosuvastatin in 8/2022 so d/ponce in 5/2023  - ALP remains elevated, although improving from last year  - he is watching intake of fried/fatty foods  - recheck CMP/fasting lipid with upcoming labs

## 2023-12-06 NOTE — PATIENT INSTRUCTIONS
For constipation, please start increase daily fiber with either psyllium husk or wheat dextrin.   Use miralax, stool softener if stool is hard.

## 2023-12-07 ASSESSMENT — ENCOUNTER SYMPTOMS
NEUROLOGICAL NEGATIVE: 1
PALPITATIONS: 0
CONSTIPATION: 1
CONSTITUTIONAL NEGATIVE: 1
COUGH: 0
SHORTNESS OF BREATH: 0
FEVER: 0
MUSCULOSKELETAL NEGATIVE: 1
SPUTUM PRODUCTION: 0

## 2024-01-11 ENCOUNTER — APPOINTMENT (OUTPATIENT)
Dept: MEDICAL GROUP | Facility: PHYSICIAN GROUP | Age: 59
End: 2024-01-11
Payer: OTHER GOVERNMENT

## 2024-01-18 ENCOUNTER — OFFICE VISIT (OUTPATIENT)
Dept: MEDICAL GROUP | Facility: PHYSICIAN GROUP | Age: 59
End: 2024-01-18
Payer: OTHER GOVERNMENT

## 2024-01-18 VITALS
RESPIRATION RATE: 16 BRPM | HEIGHT: 76 IN | OXYGEN SATURATION: 97 % | DIASTOLIC BLOOD PRESSURE: 80 MMHG | HEART RATE: 90 BPM | TEMPERATURE: 97.6 F | BODY MASS INDEX: 32.76 KG/M2 | WEIGHT: 269 LBS | SYSTOLIC BLOOD PRESSURE: 122 MMHG

## 2024-01-18 DIAGNOSIS — H61.23 BILATERAL IMPACTED CERUMEN: ICD-10-CM

## 2024-01-18 PROCEDURE — 69210 REMOVE IMPACTED EAR WAX UNI: CPT | Performed by: NURSE PRACTITIONER

## 2024-01-18 PROCEDURE — 3074F SYST BP LT 130 MM HG: CPT | Performed by: NURSE PRACTITIONER

## 2024-01-18 PROCEDURE — 3079F DIAST BP 80-89 MM HG: CPT | Performed by: NURSE PRACTITIONER

## 2024-01-18 ASSESSMENT — FIBROSIS 4 INDEX: FIB4 SCORE: 1.23

## 2024-01-18 NOTE — PROGRESS NOTES
Chief Complaint   Patient presents with    Ear Fullness     Bilateral ears x1wk       HISTORY OF PRESENT ILLNESS: Dwight Linares is a 58 y.o. male established patient who presents today to discuss:  - bilateral ears feel clogged the past week; no hearing loss    Current Outpatient Medications on File Prior to Visit   Medication Sig Dispense Refill    desonide (DESOWEN) 0.05 % lotion APPLY TO AFFECTED AREAS TWICE DAILY FOR 14 DAYS      clindamycin (CLEOCIN) 1 % Solution APPLY TO SCALP TWICE DAILY      lisinopril-hydrochlorothiazide (PRINZIDE) 20-25 MG per tablet Take 1 Tablet by mouth every day. 90 Tablet 3    Aug Betamethasone Dipropionate (DIPROLENE-AF) 0.05 % Cream       cetirizine (ZYRTEC) 10 MG Tab Take 10 mg by mouth every day.      sildenafil citrate (VIAGRA) 50 MG tablet Take 1 Tablet by mouth 1 time a day as needed for Erectile Dysfunction. 10 Tablet 1     No current facility-administered medications on file prior to visit.       has a past medical history of Anxiety, Constipation (12/6/2023), Depression, High cholesterol, Hyperlipidemia, Hypertension (10/14/2014), and Pain.     Patient Active Problem List   Diagnosis    Hypertension    Skin lesion    Chronic low back pain    Hyperlipidemia    Environmental allergies    Obesity (BMI 30-39.9)    Eczema    Family history of prostate cancer in father    Erectile dysfunction    Toenail fungus    Elevated liver function tests    Cough    Rectal lump    Cerumen debris on tympanic membrane of both ears    Anxiety    Major depressive disorder in full remission (HCC)    Elevated fasting glucose    Problems in relationship with spouse or partner    Grief counseling    Acute pain of right shoulder    Lower urinary tract symptoms (LUTS)    Constipation        Allergies:Patient has no known allergies.    Health Maintenance: deferred  Review of Systems -included above  Exam:   /80   Pulse 90   Temp 36.4 °C (97.6 °F) (Temporal)   Resp 16   Ht 1.918 m (6'  "3.5\")   Wt 122 kg (269 lb)   SpO2 97%   Body mass index is 33.18 kg/m².   Physical Exam  HENT:      Head: Normocephalic and atraumatic.      Right Ear: Ear canal and external ear normal. There is impacted cerumen.      Left Ear: Ear canal and external ear normal. There is impacted cerumen.       Assessment/Plan:  1. Bilateral impacted cerumen  Both ear canals with excessive wax build up; I dislodged and removed some wax with lighted ear curette. Then both canals soaked in debrox & thoroughly irrigated by MA. TM intact bilaterally.      Follow up:  Return if symptoms worsen or fail to improve.    Educated in proper administration of medication(s) ordered today including safety, possible SE, risks, benefits, rationale and alternatives to therapy.       Please note that this dictation was created using voice recognition software. I have made every reasonable attempt to correct obvious errors, but I expect that there are errors of grammar and possibly content that I did not discover before finalizing the note.      "

## 2024-02-25 ENCOUNTER — PATIENT MESSAGE (OUTPATIENT)
Dept: MEDICAL GROUP | Facility: PHYSICIAN GROUP | Age: 59
End: 2024-02-25
Payer: OTHER GOVERNMENT

## 2024-02-25 DIAGNOSIS — I10 PRIMARY HYPERTENSION: ICD-10-CM

## 2024-02-27 RX ORDER — LISINOPRIL AND HYDROCHLOROTHIAZIDE 25; 20 MG/1; MG/1
1 TABLET ORAL
Qty: 30 TABLET | Refills: 0 | Status: SHIPPED | OUTPATIENT
Start: 2024-02-27

## 2024-03-18 ENCOUNTER — OFFICE VISIT (OUTPATIENT)
Dept: URGENT CARE | Facility: PHYSICIAN GROUP | Age: 59
End: 2024-03-18
Payer: OTHER GOVERNMENT

## 2024-03-18 VITALS
BODY MASS INDEX: 32.83 KG/M2 | OXYGEN SATURATION: 98 % | DIASTOLIC BLOOD PRESSURE: 82 MMHG | HEIGHT: 75 IN | WEIGHT: 264 LBS | HEART RATE: 86 BPM | RESPIRATION RATE: 16 BRPM | SYSTOLIC BLOOD PRESSURE: 138 MMHG | TEMPERATURE: 103 F

## 2024-03-18 DIAGNOSIS — U07.1 COVID-19: ICD-10-CM

## 2024-03-18 LAB
FLUAV RNA SPEC QL NAA+PROBE: NEGATIVE
FLUBV RNA SPEC QL NAA+PROBE: NEGATIVE
RSV RNA SPEC QL NAA+PROBE: NEGATIVE
SARS-COV-2 RNA RESP QL NAA+PROBE: POSITIVE

## 2024-03-18 PROCEDURE — 3079F DIAST BP 80-89 MM HG: CPT | Performed by: FAMILY MEDICINE

## 2024-03-18 PROCEDURE — 0241U POCT CEPHEID COV-2, FLU A/B, RSV - PCR: CPT | Performed by: FAMILY MEDICINE

## 2024-03-18 PROCEDURE — 99214 OFFICE O/P EST MOD 30 MIN: CPT | Performed by: FAMILY MEDICINE

## 2024-03-18 PROCEDURE — 3075F SYST BP GE 130 - 139MM HG: CPT | Performed by: FAMILY MEDICINE

## 2024-03-18 RX ORDER — HYDROCORTISONE VALERATE CREAM 2 MG/G
CREAM TOPICAL
COMMUNITY
Start: 2024-01-18

## 2024-03-18 ASSESSMENT — FIBROSIS 4 INDEX: FIB4 SCORE: 1.23

## 2024-03-19 NOTE — PROGRESS NOTES
Chief Complaint   Patient presents with    Fever     100.3     Cough     X 1 day    Congestion    Letter for School/Work         Cough  This is a new problem. The current episode started yesterday. The problem has been unchanged. The problem occurs constantly. The cough is dry. Associated symptoms include : fatigue, headaches, chills, muscle aches, fever. Pertinent negatives include no   nausea, vomiting, diarrhea, sweats, weight loss or wheezing. Nothing aggravates the symptoms.  Patient has tried nothing for the symptoms. There is no history of asthma.        Past Medical History:   Diagnosis Date    Anxiety     Constipation 2023    Depression     High cholesterol     Hyperlipidemia     Hypertension 10/14/2014    Pain     low back pain         Social History     Tobacco Use    Smoking status: Some Days     Types: Cigars    Smokeless tobacco: Former    Tobacco comments:     once a month cigar   Vaping Use    Vaping Use: Never used   Substance Use Topics    Alcohol use: Not Currently     Alcohol/week: 1.2 oz     Types: 2 Cans of beer per week     Comment: few drinks every month    Drug use: No           Family History   Problem Relation Age of Onset    Heart Disease Father             Cancer Father             Cancer Maternal Grandfather             Alcohol/Drug Paternal Grandfather     Diabetes Neg Hx     Stroke Neg Hx                     Review of Systems   Constitutional: positive for fever and chills  HENT: negative for otalgia, sore throat  Cardiovascular - denies chest pain or dyspnea  Respiratory: Positive for cough.  .  Negative for wheezing.    Neurological: Negative for headaches, dizziness   GI - denies nausea, vomiting or diarrhea   - denies dysuria, discharge  Psych - denies depression, anxiety  Neuro - denies numbness or tingling.   10 point ROS otherwise negative, except per HPI             Objective:     /82   Pulse 86   Temp (!) 39.4 °C (103 °F) (Temporal)    "Resp 16   Ht 1.905 m (6' 3\")   Wt 120 kg (264 lb)   SpO2 98%       Physical Exam   Constitutional: patient is oriented to person, place, and time. Patient appears well-developed and well-nourished. No distress.   HENT:   Head: Normocephalic and atraumatic.   Right Ear: External ear normal.   Left Ear: External ear normal.   TMs normal  Nose: Mucosal edema  present. Right sinus exhibits no maxillary sinus tenderness. Left sinus exhibits no maxillary sinus tenderness.   Mouth/Throat: Mucous membranes are normal. No oral lesions.  No posterior pharyngeal erythema.  No oropharyngeal exudate or posterior oropharyngeal edema.   Eyes: Conjunctivae and EOM are normal. Pupils are equal, round, and reactive to light. Right eye exhibits no discharge. Left eye exhibits no discharge. No scleral icterus.   Neck: Normal range of motion. Neck supple. No tracheal deviation present.   Cardiovascular: Normal rate, regular rhythm and normal heart sounds.  Exam reveals no friction rub.    Pulmonary/Chest: Effort normal. No respiratory distress. Patient has no wheezes or rhonchi. Patient has no rales.    Musculoskeletal:  exhibits no edema.   Lymphadenopathy:     Patient has no cervical adenopathy.      Neurological: patient is alert and oriented to person, place, and time.   Skin: Skin is warm and dry. No rash noted. No erythema.   Psychiatric: patient  has a normal mood and affect.  behavior is normal.   Nursing note and vitals reviewed.       1. COVID-19   PCR positive      1. COVID-19    Pt is high risk for complication due to COVID-19 based on:      BMI> 30  HTN     - Nirmatrelvir&Ritonavir 300/100 20 x 150 MG & 10 x 100MG Tablet Therapy Pack; Take 300 mg nirmatrelvir (two 150 mg tablets) with 100 mg ritonavir (one 100 mg tablet) by mouth, with all three tablets taken together twice daily for 5 days.  Dispense: 30 Each; Refill: 0        Differential diagnosis, natural history, supportive care, and indications for immediate " follow-up discussed. All questions answered. Patient agrees with the plan of care.     Follow-up as needed if symptoms worsen or fail to improve to PCP, Urgent care or Emergency Room.     I have personally reviewed prior external notes and test results pertinent to today's visit.  I have independently reviewed and interpreted all diagnostics ordered during this urgent care acute visit.

## 2024-03-20 ENCOUNTER — APPOINTMENT (OUTPATIENT)
Dept: MEDICAL GROUP | Facility: PHYSICIAN GROUP | Age: 59
End: 2024-03-20
Payer: OTHER GOVERNMENT

## 2024-04-03 ENCOUNTER — APPOINTMENT (OUTPATIENT)
Dept: MEDICAL GROUP | Facility: PHYSICIAN GROUP | Age: 59
End: 2024-04-03
Payer: OTHER GOVERNMENT

## 2024-04-03 VITALS
HEART RATE: 109 BPM | SYSTOLIC BLOOD PRESSURE: 106 MMHG | HEIGHT: 76 IN | WEIGHT: 268 LBS | TEMPERATURE: 98.2 F | BODY MASS INDEX: 32.63 KG/M2 | DIASTOLIC BLOOD PRESSURE: 60 MMHG | OXYGEN SATURATION: 92 %

## 2024-04-03 DIAGNOSIS — U09.9 POST-COVID CHRONIC COUGH: ICD-10-CM

## 2024-04-03 DIAGNOSIS — R05.3 POST-COVID CHRONIC COUGH: ICD-10-CM

## 2024-04-03 DIAGNOSIS — Z01.00 ENCOUNTER FOR OPHTHALMIC EXAMINATION AND EVALUATION: ICD-10-CM

## 2024-04-03 PROCEDURE — 99213 OFFICE O/P EST LOW 20 MIN: CPT | Performed by: NURSE PRACTITIONER

## 2024-04-03 PROCEDURE — 3078F DIAST BP <80 MM HG: CPT | Performed by: NURSE PRACTITIONER

## 2024-04-03 PROCEDURE — 3074F SYST BP LT 130 MM HG: CPT | Performed by: NURSE PRACTITIONER

## 2024-04-03 ASSESSMENT — PATIENT HEALTH QUESTIONNAIRE - PHQ9
1. LITTLE INTEREST OR PLEASURE IN DOING THINGS: NOT AT ALL
6. FEELING BAD ABOUT YOURSELF - OR THAT YOU ARE A FAILURE OR HAVE LET YOURSELF OR YOUR FAMILY DOWN: NOT AL ALL
5. POOR APPETITE OR OVEREATING: NOT AT ALL
9. THOUGHTS THAT YOU WOULD BE BETTER OFF DEAD, OR OF HURTING YOURSELF: NOT AT ALL
8. MOVING OR SPEAKING SO SLOWLY THAT OTHER PEOPLE COULD HAVE NOTICED. OR THE OPPOSITE, BEING SO FIGETY OR RESTLESS THAT YOU HAVE BEEN MOVING AROUND A LOT MORE THAN USUAL: NOT AT ALL
7. TROUBLE CONCENTRATING ON THINGS, SUCH AS READING THE NEWSPAPER OR WATCHING TELEVISION: NOT AT ALL
3. TROUBLE FALLING OR STAYING ASLEEP OR SLEEPING TOO MUCH: NOT AT ALL
4. FEELING TIRED OR HAVING LITTLE ENERGY: NOT AT ALL
2. FEELING DOWN, DEPRESSED, IRRITABLE, OR HOPELESS: NOT AT ALL
SUM OF ALL RESPONSES TO PHQ QUESTIONS 1-9: 0
SUM OF ALL RESPONSES TO PHQ9 QUESTIONS 1 AND 2: 0

## 2024-04-03 ASSESSMENT — ENCOUNTER SYMPTOMS
SPUTUM PRODUCTION: 0
SHORTNESS OF BREATH: 0
NEUROLOGICAL NEGATIVE: 1
EYES NEGATIVE: 1
COUGH: 1
MUSCULOSKELETAL NEGATIVE: 1
CONSTITUTIONAL NEGATIVE: 1
FEVER: 0
PALPITATIONS: 0
GASTROINTESTINAL NEGATIVE: 1

## 2024-04-03 ASSESSMENT — FIBROSIS 4 INDEX: FIB4 SCORE: 1.23

## 2024-04-03 NOTE — PROGRESS NOTES
Subjective       CC:   Chief Complaint   Patient presents with    Cough     X 2-3 weeks. Was covid positive 3/18/24.     Referral Needed     Macular evaluation for HD retina         HPI:   Patient is a 58 y.o. established male patient with medical history listed below here today to follow up on dry cough. Started about 3 weeks ago. He was seen in  on 3/18/2024 for this, tested positive for COVID. He was treated with 5 day course of Paxlovid. He still has nasal drainage and dry cough.    He saw Optometrist on 1/19/2024; pigmentary changes noted in both eyes and recommended ophthalmology evaluation. He needs referral to come from PCP for  coverage.     Patient Active Problem List   Diagnosis    Hypertension    Skin lesion    Chronic low back pain    Hyperlipidemia    Environmental allergies    Obesity (BMI 30-39.9)    Eczema    Family history of prostate cancer in father    Erectile dysfunction    Toenail fungus    Elevated liver function tests    Cough    Rectal lump    Cerumen debris on tympanic membrane of both ears    Anxiety    Major depressive disorder in full remission (HCC)    Elevated fasting glucose    Problems in relationship with spouse or partner    Grief counseling    Acute pain of right shoulder    Lower urinary tract symptoms (LUTS)    Constipation       Past Medical History:   Diagnosis Date    Anxiety     Constipation 12/6/2023    Depression     High cholesterol     Hyperlipidemia     Hypertension 10/14/2014    Pain     low back pain        Past Surgical History:   Procedure Laterality Date    [place] SURG DIAGNOSTIC EXAM, ANORECTAL N/A 4/27/2023    Procedure: RECTAL EXAM UNDER ANETHESIA, ANAL FISTULECTOMY;  Surgeon: Manny Mccann M.D.;  Location: SURGERY [facility];  Service: General    COLONOSCOPY      x2    DENTAL SURGERY      OTHER  Ink    Mouth Surgery when i was a child        Current Outpatient Medications on File Prior to Visit   Medication Sig Dispense Refill    hydrocortisone  "(WESTCORT) 0.2 % cream       lisinopril-hydrochlorothiazide (PRINZIDE) 20-25 MG per tablet Take 1 Tablet by mouth every day. 30 Tablet 0    desonide (DESOWEN) 0.05 % lotion        No current facility-administered medications on file prior to visit.        Health Maintenance: Completed    ROS:  Review of Systems   Constitutional: Negative.  Negative for fever and malaise/fatigue.   HENT:  Positive for congestion.    Eyes: Negative.    Respiratory:  Positive for cough. Negative for sputum production and shortness of breath.    Cardiovascular:  Negative for chest pain, palpitations and leg swelling.   Gastrointestinal: Negative.    Genitourinary: Negative.    Musculoskeletal: Negative.    Neurological: Negative.    Endo/Heme/Allergies: Negative.        Objective       Exam:  /60 (BP Location: Left arm, Patient Position: Sitting, BP Cuff Size: Adult)   Pulse (!) 109   Temp 36.8 °C (98.2 °F) (Temporal)   Ht 1.918 m (6' 3.5\")   Wt 122 kg (268 lb)   SpO2 92%   BMI 33.06 kg/m²  Body mass index is 33.06 kg/m².    Physical Exam  Constitutional:       Appearance: Normal appearance.   HENT:      Nose: Congestion present.      Mouth/Throat:      Mouth: Mucous membranes are moist.      Pharynx: Posterior oropharyngeal erythema present. No oropharyngeal exudate.   Eyes:      Pupils: Pupils are equal, round, and reactive to light.   Cardiovascular:      Rate and Rhythm: Normal rate and regular rhythm.      Pulses: Normal pulses.      Heart sounds: Normal heart sounds.   Musculoskeletal:      Right lower leg: No edema.      Left lower leg: No edema.   Skin:     General: Skin is warm and dry.   Neurological:      Mental Status: He is alert.         Assessment & Plan       58 y.o. male with the following -   1. Post-COVID chronic cough  Diagnosed with COVID on 3/18/2024; completed 5 day course of Paxlovid. Still with nasal congestions, some redness in his throat with dry cough. He denies fever, sore throat, dyspnea. BS are " clear on exam today. SaO2 92% on RA. Recommend decongestant like mucinex for 3-7 days; prn antihistamine, he is taking zyzal.     2. Encounter for ophthalmic examination and evaluation  - Referral to Ophthalmology      Educated in proper administration of medication(s) ordered today including safety, possible SE, risks, benefits, rationale and alternatives to therapy.     Return in about 4 weeks (around 5/1/2024) for hyperlipidemia, htn, cough.    Please note that this dictation was created using voice recognition software. I have made every reasonable attempt to correct obvious errors, but I expect that there are errors of grammar and possibly content that I did not discover before finalizing the note.

## 2024-05-03 ENCOUNTER — HOSPITAL ENCOUNTER (OUTPATIENT)
Dept: LAB | Facility: MEDICAL CENTER | Age: 59
End: 2024-05-03
Attending: NURSE PRACTITIONER
Payer: OTHER GOVERNMENT

## 2024-05-03 DIAGNOSIS — E78.2 MIXED HYPERLIPIDEMIA: Chronic | ICD-10-CM

## 2024-05-03 DIAGNOSIS — I10 PRIMARY HYPERTENSION: Chronic | ICD-10-CM

## 2024-05-04 LAB
ALBUMIN SERPL BCP-MCNC: 4.1 G/DL (ref 3.2–4.9)
ALBUMIN/GLOB SERPL: 1.5 G/DL
ALP SERPL-CCNC: 110 U/L (ref 30–99)
ALT SERPL-CCNC: 21 U/L (ref 2–50)
ANION GAP SERPL CALC-SCNC: 11 MMOL/L (ref 7–16)
AST SERPL-CCNC: 21 U/L (ref 12–45)
BILIRUB SERPL-MCNC: 0.3 MG/DL (ref 0.1–1.5)
BUN SERPL-MCNC: 22 MG/DL (ref 8–22)
CALCIUM ALBUM COR SERPL-MCNC: 8.9 MG/DL (ref 8.5–10.5)
CALCIUM SERPL-MCNC: 9 MG/DL (ref 8.5–10.5)
CHLORIDE SERPL-SCNC: 101 MMOL/L (ref 96–112)
CHOLEST SERPL-MCNC: 184 MG/DL (ref 100–199)
CO2 SERPL-SCNC: 23 MMOL/L (ref 20–33)
CREAT SERPL-MCNC: 0.84 MG/DL (ref 0.5–1.4)
GFR SERPLBLD CREATININE-BSD FMLA CKD-EPI: 100 ML/MIN/1.73 M 2
GLOBULIN SER CALC-MCNC: 2.8 G/DL (ref 1.9–3.5)
GLUCOSE SERPL-MCNC: 89 MG/DL (ref 65–99)
HDLC SERPL-MCNC: 36 MG/DL
LDLC SERPL CALC-MCNC: 133 MG/DL
POTASSIUM SERPL-SCNC: 4.2 MMOL/L (ref 3.6–5.5)
PROT SERPL-MCNC: 6.9 G/DL (ref 6–8.2)
SODIUM SERPL-SCNC: 135 MMOL/L (ref 135–145)
TRIGL SERPL-MCNC: 76 MG/DL (ref 0–149)

## 2024-05-06 ENCOUNTER — APPOINTMENT (OUTPATIENT)
Dept: MEDICAL GROUP | Facility: PHYSICIAN GROUP | Age: 59
End: 2024-05-06
Payer: OTHER GOVERNMENT

## 2024-05-06 VITALS
WEIGHT: 270 LBS | OXYGEN SATURATION: 95 % | HEART RATE: 100 BPM | DIASTOLIC BLOOD PRESSURE: 58 MMHG | HEIGHT: 76 IN | BODY MASS INDEX: 32.88 KG/M2 | TEMPERATURE: 98.7 F | SYSTOLIC BLOOD PRESSURE: 130 MMHG

## 2024-05-06 DIAGNOSIS — E78.2 MIXED HYPERLIPIDEMIA: Chronic | ICD-10-CM

## 2024-05-06 DIAGNOSIS — I10 PRIMARY HYPERTENSION: Chronic | ICD-10-CM

## 2024-05-06 DIAGNOSIS — R79.89 ELEVATED LIVER FUNCTION TESTS: ICD-10-CM

## 2024-05-06 DIAGNOSIS — E66.9 OBESITY (BMI 30-39.9): ICD-10-CM

## 2024-05-06 PROCEDURE — 3078F DIAST BP <80 MM HG: CPT | Performed by: NURSE PRACTITIONER

## 2024-05-06 PROCEDURE — 99214 OFFICE O/P EST MOD 30 MIN: CPT | Performed by: NURSE PRACTITIONER

## 2024-05-06 PROCEDURE — 3075F SYST BP GE 130 - 139MM HG: CPT | Performed by: NURSE PRACTITIONER

## 2024-05-06 RX ORDER — LISINOPRIL AND HYDROCHLOROTHIAZIDE 25; 20 MG/1; MG/1
1 TABLET ORAL
Qty: 90 TABLET | Refills: 3 | Status: SHIPPED | OUTPATIENT
Start: 2024-05-06 | End: 2024-05-16 | Stop reason: SDUPTHER

## 2024-05-06 RX ORDER — ROSUVASTATIN CALCIUM 5 MG/1
5 TABLET, COATED ORAL EVERY EVENING
Qty: 90 TABLET | Refills: 2 | Status: SHIPPED | OUTPATIENT
Start: 2024-05-06

## 2024-05-06 ASSESSMENT — ENCOUNTER SYMPTOMS
SPUTUM PRODUCTION: 0
GASTROINTESTINAL NEGATIVE: 1
MUSCULOSKELETAL NEGATIVE: 1
CONSTITUTIONAL NEGATIVE: 1
PSYCHIATRIC NEGATIVE: 1
NEUROLOGICAL NEGATIVE: 1
PALPITATIONS: 0
COUGH: 0
SHORTNESS OF BREATH: 0
EYES NEGATIVE: 1
FEVER: 0

## 2024-05-06 ASSESSMENT — FIBROSIS 4 INDEX: FIB4 SCORE: 0.99

## 2024-05-06 NOTE — ASSESSMENT & PLAN NOTE
Latest Reference Range & Units 05/16/23 07:05 05/03/24 06:31   Cholesterol,Tot 100 - 199 mg/dL 122 184   Triglycerides 0 - 149 mg/dL 79 76   HDL >=40 mg/dL 31 ! 36 !   LDL <100 mg/dL 75 133 (H)     Chronic; LDL goal < 100; hx hypertension; had elevated ALP, LDL remained in normal range after we reduced rosuvastatin in 8/2022 so d/ponce in 5/2023. Looks like LDL is getting high again so we will restart Rosuvastatin   - restart Rosuvastatin 5mg QHS; he has left over pills at home.   - ALP remains elevated, although improving from last year  - he is watching intake of fried/fatty foods  - recheck fasting lipid in 6 months

## 2024-05-06 NOTE — ASSESSMENT & PLAN NOTE
Latest Reference Range & Units 11/22/19 12:16 10/31/20 07:40 04/15/21 10:20 07/07/22 07:37 04/20/23 15:34 05/16/23 07:05 05/03/24 06:31   AST(SGOT) 12 - 45 U/L 24 33 33 28 25 22 21   ALT(SGPT) 2 - 50 U/L 25 32 29 23 22 15 21   Alkaline Phosphatase 30 - 99 U/L 122 (H) 136 (H) 126 (H) 120 (H) 131 (H) 127 (H) 110 (H)   Total Bilirubin 0.1 - 1.5 mg/dL 0.5 0.3 0.3 0.3 0.2 0.3 0.3   Direct Bilirubin 0.1 - 0.5 mg/dL  <0.2        Indirect Bilirubin 0.0 - 1.0 mg/dL  see below        Albumin 3.2 - 4.9 g/dL 4.5 4.4 4.1 4.3 4.4 4.2 4.1       Chronically elevated ALP elevated; AST/ALT in normal range. No abdominal pain or jaundice, no bone pain reported  We reduced dosing of rosuvastatin 10mg-->5mg in 8/2023; LDL had been in normal range and we did trial off statin; restarting rosuvastatin today for elevated

## 2024-05-06 NOTE — ASSESSMENT & PLAN NOTE
Chronic; goal BP < 130/80  BP in clinic today 130/58; no CP, dizziness, palpitations, edema  - continue Lisinopril-HCTZ 20-25mg QD  - he will get home BP cuff & keep weekly log; we plan to start reducing BP meds as he gets active & BP gets lower  - reviewed CMP results at last visit; slightly elevated BUN, he is hydrating; ALP elevated and improving; we reduced rosuvastatin in 8/2022 with some improvement in levels & then d/ponce statin in 5/2023; LDL increased and will be restarting rosuvastatin

## 2024-05-06 NOTE — PROGRESS NOTES
Subjective       CC:   Chief Complaint   Patient presents with    Lab Results    Hypertension Follow-up        HPI:   Patient is a 58 y.o. established male patient with medical history listed below here today for evaluation and management of hypertension, hyperlipidemia. Doing well today, no new complaints.     Problem   Elevated Liver Function Tests   Hypertension    Taking Lisinopril-HCTZ 20-25mg QD     Hyperlipidemia       Patient Active Problem List   Diagnosis    Hypertension    Skin lesion    Chronic low back pain    Hyperlipidemia    Environmental allergies    Obesity (BMI 30-39.9)    Eczema    Family history of prostate cancer in father    Erectile dysfunction    Toenail fungus    Elevated liver function tests    Cough    Rectal lump    Cerumen debris on tympanic membrane of both ears    Anxiety    Major depressive disorder in full remission (HCC)    Elevated fasting glucose    Problems in relationship with spouse or partner    Grief counseling    Acute pain of right shoulder    Lower urinary tract symptoms (LUTS)    Constipation       Past Medical History:   Diagnosis Date    Anxiety     Constipation 12/6/2023    Depression     High cholesterol     Hyperlipidemia     Hypertension 10/14/2014    Pain     low back pain        Past Surgical History:   Procedure Laterality Date    [place] SURG DIAGNOSTIC EXAM, ANORECTAL N/A 4/27/2023    Procedure: RECTAL EXAM UNDER ANETHESIA, ANAL FISTULECTOMY;  Surgeon: Manny Mccann M.D.;  Location: SURGERY [facility];  Service: General    COLONOSCOPY      x2    DENTAL SURGERY      OTHER  Ink    Mouth Surgery when i was a child        Current Outpatient Medications on File Prior to Visit   Medication Sig Dispense Refill    hydrocortisone (WESTCORT) 0.2 % cream       desonide (DESOWEN) 0.05 % lotion        No current facility-administered medications on file prior to visit.        Health Maintenance: Completed    ROS:  Review of Systems   Constitutional: Negative.  Negative  "for fever and malaise/fatigue.   HENT: Negative.     Eyes: Negative.    Respiratory:  Negative for cough, sputum production and shortness of breath.    Cardiovascular:  Negative for chest pain, palpitations and leg swelling.   Gastrointestinal: Negative.    Genitourinary: Negative.    Musculoskeletal: Negative.    Neurological: Negative.    Endo/Heme/Allergies: Negative.    Psychiatric/Behavioral: Negative.         Objective       Exam:  /58   Pulse 100   Temp 37.1 °C (98.7 °F) (Temporal)   Ht 1.918 m (6' 3.5\")   Wt 122 kg (270 lb)   SpO2 95%   BMI 33.30 kg/m²  Body mass index is 33.3 kg/m².    Physical Exam  Constitutional:       Appearance: Normal appearance.   Neurological:      Mental Status: He is alert.         Labs: reviewed with patient; questions answered    Assessment & Plan       58 y.o. male with the following -     Problem List Items Addressed This Visit       Hypertension (Chronic)     Chronic; goal BP < 130/80  BP in clinic today 130/58; no CP, dizziness, palpitations, edema  - continue Lisinopril-HCTZ 20-25mg QD  - he will get home BP cuff & keep weekly log; we plan to start reducing BP meds as he gets active & BP gets lower  - reviewed CMP results at last visit; slightly elevated BUN, he is hydrating; ALP elevated and improving; we reduced rosuvastatin in 8/2022 with some improvement in levels & then d/ponce statin in 5/2023; LDL increased and will be restarting rosuvastatin          Relevant Medications    rosuvastatin (CRESTOR) 5 MG Tab    lisinopril-hydrochlorothiazide (PRINZIDE) 20-25 MG per tablet    Hyperlipidemia (Chronic)      Latest Reference Range & Units 05/16/23 07:05 05/03/24 06:31   Cholesterol,Tot 100 - 199 mg/dL 122 184   Triglycerides 0 - 149 mg/dL 79 76   HDL >=40 mg/dL 31 ! 36 !   LDL <100 mg/dL 75 133 (H)   Chronic; LDL goal < 100; hx hypertension; had elevated ALP, LDL remained in normal range after we reduced rosuvastatin in 8/2022 so d/ponce in 5/2023. Looks like LDL " is getting high again so we will restart Rosuvastatin   - restart Rosuvastatin 5mg QHS; he has left over pills at home.   - ALP remains elevated, although improving from last year  - he is watching intake of fried/fatty foods  - recheck fasting lipid in 6 months         Relevant Medications    rosuvastatin (CRESTOR) 5 MG Tab    lisinopril-hydrochlorothiazide (PRINZIDE) 20-25 MG per tablet    Elevated liver function tests      Latest Reference Range & Units 11/22/19 12:16 10/31/20 07:40 04/15/21 10:20 07/07/22 07:37 04/20/23 15:34 05/16/23 07:05 05/03/24 06:31   AST(SGOT) 12 - 45 U/L 24 33 33 28 25 22 21   ALT(SGPT) 2 - 50 U/L 25 32 29 23 22 15 21   Alkaline Phosphatase 30 - 99 U/L 122 (H) 136 (H) 126 (H) 120 (H) 131 (H) 127 (H) 110 (H)   Total Bilirubin 0.1 - 1.5 mg/dL 0.5 0.3 0.3 0.3 0.2 0.3 0.3   Direct Bilirubin 0.1 - 0.5 mg/dL  <0.2        Indirect Bilirubin 0.0 - 1.0 mg/dL  see below        Albumin 3.2 - 4.9 g/dL 4.5 4.4 4.1 4.3 4.4 4.2 4.1     Chronically elevated ALP elevated; AST/ALT in normal range. No abdominal pain or jaundice, no bone pain reported  We reduced dosing of rosuvastatin 10mg-->5mg in 8/2023; LDL had been in normal range and we did trial off statin; restarting rosuvastatin today for elevated               Medications Prescribed Today:  1. Mixed hyperlipidemia  - rosuvastatin (CRESTOR) 5 MG Tab; Take 1 Tablet by mouth every evening.  Dispense: 90 Tablet; Refill: 2    2. Primary hypertension  - lisinopril-hydrochlorothiazide (PRINZIDE) 20-25 MG per tablet; Take 1 Tablet by mouth every day.  Dispense: 90 Tablet; Refill: 3      Educated in proper administration of medication(s) ordered today including safety, possible SE, risks, benefits, rationale and alternatives to therapy.     Return in about 3 months (around 8/6/2024) for establish care.    Please note that this dictation was created using voice recognition software. I have made every reasonable attempt to correct obvious errors, but  I expect that there are errors of grammar and possibly content that I did not discover before finalizing the note.

## 2024-05-15 ENCOUNTER — DOCUMENTATION (OUTPATIENT)
Dept: HEALTH INFORMATION MANAGEMENT | Facility: OTHER | Age: 59
End: 2024-05-15
Payer: OTHER GOVERNMENT

## 2024-05-16 ENCOUNTER — PATIENT MESSAGE (OUTPATIENT)
Dept: MEDICAL GROUP | Facility: PHYSICIAN GROUP | Age: 59
End: 2024-05-16
Payer: OTHER GOVERNMENT

## 2024-05-16 DIAGNOSIS — I10 PRIMARY HYPERTENSION: Chronic | ICD-10-CM

## 2024-05-16 DIAGNOSIS — L98.9 SKIN LESION: ICD-10-CM

## 2024-05-17 RX ORDER — LISINOPRIL AND HYDROCHLOROTHIAZIDE 25; 20 MG/1; MG/1
1 TABLET ORAL
Qty: 90 TABLET | Refills: 3 | Status: SHIPPED | OUTPATIENT
Start: 2024-05-17

## 2024-05-17 NOTE — PROGRESS NOTES
1. Skin lesion  [facility] in [place]; referral renewal per  for annual assessment; hx multiple skin moles     - Referral to Dermatology

## 2024-05-17 NOTE — TELEPHONE ENCOUNTER
Received request via: Pharmacy    Was the patient seen in the last year in this department? Yes    Does the patient have an active prescription (recently filled or refills available) for medication(s) requested? Yes. Pharmacy change    Pharmacy Name: [facility]    Does the patient have [facility] and need 100 day supply (blood pressure, diabetes and cholesterol meds only)? Patient does not have SCP

## 2024-09-16 ENCOUNTER — OFFICE VISIT (OUTPATIENT)
Dept: URGENT CARE | Facility: PHYSICIAN GROUP | Age: 59
End: 2024-09-16
Payer: OTHER GOVERNMENT

## 2024-09-16 VITALS
BODY MASS INDEX: 34.19 KG/M2 | RESPIRATION RATE: 16 BRPM | HEART RATE: 102 BPM | OXYGEN SATURATION: 97 % | HEIGHT: 75 IN | SYSTOLIC BLOOD PRESSURE: 162 MMHG | TEMPERATURE: 98.8 F | DIASTOLIC BLOOD PRESSURE: 74 MMHG | WEIGHT: 275 LBS

## 2024-09-16 DIAGNOSIS — R03.0 ELEVATED BLOOD PRESSURE READING: ICD-10-CM

## 2024-09-16 DIAGNOSIS — L98.9 SKIN LESION: ICD-10-CM

## 2024-09-16 DIAGNOSIS — I10 PRIMARY HYPERTENSION: Chronic | ICD-10-CM

## 2024-09-16 DIAGNOSIS — L60.0 INGROWN TOENAIL: ICD-10-CM

## 2024-09-16 DIAGNOSIS — R00.0 TACHYCARDIA: ICD-10-CM

## 2024-09-16 PROCEDURE — 3078F DIAST BP <80 MM HG: CPT | Performed by: FAMILY MEDICINE

## 2024-09-16 PROCEDURE — 3077F SYST BP >= 140 MM HG: CPT | Performed by: FAMILY MEDICINE

## 2024-09-16 PROCEDURE — 99214 OFFICE O/P EST MOD 30 MIN: CPT | Performed by: FAMILY MEDICINE

## 2024-09-16 RX ORDER — LISINOPRIL AND HYDROCHLOROTHIAZIDE 20; 25 MG/1; MG/1
1 TABLET ORAL
Qty: 1 TABLET | Refills: 0 | Status: SHIPPED | OUTPATIENT
Start: 2024-09-16

## 2024-09-16 RX ORDER — SULFAMETHOXAZOLE/TRIMETHOPRIM 800-160 MG
1 TABLET ORAL 2 TIMES DAILY
Qty: 10 TABLET | Refills: 0 | Status: SHIPPED | OUTPATIENT
Start: 2024-09-16 | End: 2024-09-21

## 2024-09-16 ASSESSMENT — FIBROSIS 4 INDEX: FIB4 SCORE: 0.99

## 2024-09-17 NOTE — PROGRESS NOTES
"  Subjective:      58 y.o. male presents to urgent care for ingrown toenail to his right great toe.He has had a fungal infection to this toenail for several years, but about a month ago it started to also become ingrown.  He has associated pain, that is constant.    Blood pressure and heart rate are with elevated today in urgent care.  He does have a history of hypertension for which he was previously taking lisinopril and hydrochlorothiazide.  He stopped taking this medication about 2 months ago.  He denies any chest pain, palpitations, or shortness of breath.    He denies any other questions or concerns at this time.    Current problem list, medication, and past medical/surgical history were reviewed in Epic.    ROS  See HPI     Objective:      BP (!) 162/74   Pulse (!) 102   Temp 37.1 °C (98.8 °F) (Temporal)   Resp 16   Ht 1.905 m (6' 3\")   Wt 125 kg (275 lb)   SpO2 97%   BMI 34.37 kg/m²     Physical Exam  Constitutional:       General: He is not in acute distress.     Appearance: He is not diaphoretic.   Cardiovascular:      Rate and Rhythm: Normal rate and regular rhythm.      Heart sounds: Normal heart sounds.   Pulmonary:      Effort: Pulmonary effort is normal. No respiratory distress.      Breath sounds: Normal breath sounds.   Skin:     Comments: Right great and second toenail are thickened and yellowed.  Right great toenail is also ingrown   Neurological:      Mental Status: He is alert.   Psychiatric:         Mood and Affect: Affect normal.         Judgment: Judgment normal.       Assessment/Plan:     1. Ingrown toenail  Referral to podiatry has been placed.  Prescription for Bactrim has been sent.  He is encouraged to use Epsom salt soaks 2 times daily.  - Referral to Podiatry  - sulfamethoxazole-trimethoprim (BACTRIM DS) 800-160 MG tablet; Take 1 Tablet by mouth 2 times a day for 5 days.  Dispense: 10 Tablet; Refill: 0    2. Elevated blood pressure reading  3. Tachycardia  Systemic symptoms seen " through elevated blood pressure and tachycardia.  He does have lisinopril and hydrochlorothiazide at home which she will start taking as prescribed.  Follow with PCP.          Instructed to return to Urgent Care or nearest Emergency Department if symptoms fail to improve, for any change in condition, further concerns, or new concerning symptoms. Patient states understanding of the plan of care and discharge instructions.    Corrine Oreilly M.D.

## 2024-10-01 ENCOUNTER — OFFICE VISIT (OUTPATIENT)
Dept: URGENT CARE | Facility: PHYSICIAN GROUP | Age: 59
End: 2024-10-01
Payer: OTHER GOVERNMENT

## 2024-10-01 VITALS
OXYGEN SATURATION: 94 % | DIASTOLIC BLOOD PRESSURE: 72 MMHG | RESPIRATION RATE: 16 BRPM | WEIGHT: 268.4 LBS | HEART RATE: 92 BPM | HEIGHT: 76 IN | TEMPERATURE: 98.2 F | SYSTOLIC BLOOD PRESSURE: 116 MMHG | BODY MASS INDEX: 32.68 KG/M2

## 2024-10-01 DIAGNOSIS — Z12.83 SKIN CANCER SCREENING: ICD-10-CM

## 2024-10-01 DIAGNOSIS — L60.0 INGROWN TOENAIL: ICD-10-CM

## 2024-10-01 PROCEDURE — 3074F SYST BP LT 130 MM HG: CPT | Performed by: NURSE PRACTITIONER

## 2024-10-01 PROCEDURE — 3078F DIAST BP <80 MM HG: CPT | Performed by: NURSE PRACTITIONER

## 2024-10-01 PROCEDURE — 99214 OFFICE O/P EST MOD 30 MIN: CPT | Performed by: NURSE PRACTITIONER

## 2024-10-01 RX ORDER — SULFAMETHOXAZOLE AND TRIMETHOPRIM 800; 160 MG/1; MG/1
1 TABLET ORAL 2 TIMES DAILY
Qty: 20 TABLET | Refills: 0 | Status: SHIPPED | OUTPATIENT
Start: 2024-10-01 | End: 2024-10-01

## 2024-10-01 RX ORDER — SULFAMETHOXAZOLE AND TRIMETHOPRIM 800; 160 MG/1; MG/1
1 TABLET ORAL 2 TIMES DAILY
Qty: 20 TABLET | Refills: 0 | Status: SHIPPED | OUTPATIENT
Start: 2024-10-01 | End: 2024-10-11

## 2024-10-01 ASSESSMENT — FIBROSIS 4 INDEX: FIB4 SCORE: 0.99

## 2024-10-15 ENCOUNTER — OFFICE VISIT (OUTPATIENT)
Dept: URGENT CARE | Facility: PHYSICIAN GROUP | Age: 59
End: 2024-10-15
Payer: OTHER GOVERNMENT

## 2024-10-15 VITALS
HEART RATE: 74 BPM | BODY MASS INDEX: 33.57 KG/M2 | DIASTOLIC BLOOD PRESSURE: 68 MMHG | HEIGHT: 75 IN | SYSTOLIC BLOOD PRESSURE: 128 MMHG | WEIGHT: 270 LBS | TEMPERATURE: 97.8 F | OXYGEN SATURATION: 98 % | RESPIRATION RATE: 16 BRPM

## 2024-10-15 DIAGNOSIS — L03.031 CELLULITIS OF GREAT TOE, RIGHT: ICD-10-CM

## 2024-10-15 DIAGNOSIS — L60.0 INGROWN TOENAIL OF RIGHT FOOT: ICD-10-CM

## 2024-10-15 PROCEDURE — 3078F DIAST BP <80 MM HG: CPT | Performed by: PHYSICIAN ASSISTANT

## 2024-10-15 PROCEDURE — 3074F SYST BP LT 130 MM HG: CPT | Performed by: PHYSICIAN ASSISTANT

## 2024-10-15 PROCEDURE — 99213 OFFICE O/P EST LOW 20 MIN: CPT | Performed by: PHYSICIAN ASSISTANT

## 2024-10-15 RX ORDER — SULFAMETHOXAZOLE AND TRIMETHOPRIM 800; 160 MG/1; MG/1
1 TABLET ORAL 2 TIMES DAILY
Qty: 14 TABLET | Refills: 0 | Status: SHIPPED | OUTPATIENT
Start: 2024-10-15 | End: 2024-10-15

## 2024-10-15 RX ORDER — SULFAMETHOXAZOLE AND TRIMETHOPRIM 800; 160 MG/1; MG/1
1 TABLET ORAL 2 TIMES DAILY
Qty: 10 TABLET | Refills: 1 | Status: SHIPPED | OUTPATIENT
Start: 2024-10-15 | End: 2024-10-20

## 2024-10-15 ASSESSMENT — FIBROSIS 4 INDEX: FIB4 SCORE: 0.99

## 2024-10-17 ENCOUNTER — PATIENT MESSAGE (OUTPATIENT)
Dept: MEDICAL GROUP | Facility: CLINIC | Age: 59
End: 2024-10-17

## 2024-10-17 ENCOUNTER — OFFICE VISIT (OUTPATIENT)
Dept: MEDICAL GROUP | Facility: CLINIC | Age: 59
End: 2024-10-17
Payer: OTHER GOVERNMENT

## 2024-10-17 VITALS
SYSTOLIC BLOOD PRESSURE: 112 MMHG | BODY MASS INDEX: 32.76 KG/M2 | DIASTOLIC BLOOD PRESSURE: 68 MMHG | TEMPERATURE: 97.9 F | OXYGEN SATURATION: 98 % | HEIGHT: 74 IN | RESPIRATION RATE: 20 BRPM | HEART RATE: 97 BPM | WEIGHT: 255.29 LBS

## 2024-10-17 DIAGNOSIS — R79.89 ELEVATED LIVER FUNCTION TESTS: ICD-10-CM

## 2024-10-17 DIAGNOSIS — Z12.5 PROSTATE CANCER SCREENING: ICD-10-CM

## 2024-10-17 DIAGNOSIS — L08.9 TOE INFECTION: ICD-10-CM

## 2024-10-17 DIAGNOSIS — I10 PRIMARY HYPERTENSION: Chronic | ICD-10-CM

## 2024-10-17 DIAGNOSIS — E66.9 OBESITY (BMI 30-39.9): ICD-10-CM

## 2024-10-17 DIAGNOSIS — E78.2 MIXED HYPERLIPIDEMIA: Chronic | ICD-10-CM

## 2024-10-17 PROCEDURE — 3078F DIAST BP <80 MM HG: CPT | Performed by: PHYSICIAN ASSISTANT

## 2024-10-17 PROCEDURE — 3074F SYST BP LT 130 MM HG: CPT | Performed by: PHYSICIAN ASSISTANT

## 2024-10-17 PROCEDURE — 99214 OFFICE O/P EST MOD 30 MIN: CPT | Performed by: PHYSICIAN ASSISTANT

## 2024-10-17 ASSESSMENT — FIBROSIS 4 INDEX: FIB4 SCORE: 0.99

## 2025-01-15 ENCOUNTER — OFFICE VISIT (OUTPATIENT)
Dept: MEDICAL GROUP | Facility: PHYSICIAN GROUP | Age: 60
End: 2025-01-15
Payer: OTHER GOVERNMENT

## 2025-01-15 VITALS
OXYGEN SATURATION: 93 % | WEIGHT: 263 LBS | SYSTOLIC BLOOD PRESSURE: 120 MMHG | BODY MASS INDEX: 33.75 KG/M2 | DIASTOLIC BLOOD PRESSURE: 70 MMHG | RESPIRATION RATE: 20 BRPM | HEART RATE: 67 BPM | TEMPERATURE: 97.6 F | HEIGHT: 74 IN

## 2025-01-15 DIAGNOSIS — L08.9 TOE INFECTION: ICD-10-CM

## 2025-01-15 DIAGNOSIS — I10 PRIMARY HYPERTENSION: Chronic | ICD-10-CM

## 2025-01-15 DIAGNOSIS — Z12.5 SCREENING FOR MALIGNANT NEOPLASM OF PROSTATE: ICD-10-CM

## 2025-01-15 DIAGNOSIS — E78.2 MIXED HYPERLIPIDEMIA: Chronic | ICD-10-CM

## 2025-01-15 DIAGNOSIS — K59.00 CONSTIPATION, UNSPECIFIED CONSTIPATION TYPE: ICD-10-CM

## 2025-01-15 PROBLEM — R05.9 COUGH: Status: RESOLVED | Noted: 2018-07-10 | Resolved: 2025-01-15

## 2025-01-15 PROCEDURE — 99214 OFFICE O/P EST MOD 30 MIN: CPT | Performed by: FAMILY MEDICINE

## 2025-01-15 PROCEDURE — 3074F SYST BP LT 130 MM HG: CPT | Performed by: FAMILY MEDICINE

## 2025-01-15 PROCEDURE — 3078F DIAST BP <80 MM HG: CPT | Performed by: FAMILY MEDICINE

## 2025-01-15 RX ORDER — SULFAMETHOXAZOLE AND TRIMETHOPRIM 800; 160 MG/1; MG/1
1 TABLET ORAL 2 TIMES DAILY
COMMUNITY
Start: 2024-12-05 | End: 2025-01-15

## 2025-01-15 RX ORDER — TRAMADOL HYDROCHLORIDE 50 MG/1
TABLET ORAL
COMMUNITY
Start: 2024-12-05 | End: 2025-01-15

## 2025-01-15 RX ORDER — ROSUVASTATIN CALCIUM 5 MG/1
5 TABLET, COATED ORAL EVERY EVENING
COMMUNITY

## 2025-01-15 ASSESSMENT — PATIENT HEALTH QUESTIONNAIRE - PHQ9: CLINICAL INTERPRETATION OF PHQ2 SCORE: 0

## 2025-01-15 ASSESSMENT — FIBROSIS 4 INDEX: FIB4 SCORE: 1.007159493396887913

## 2025-01-15 NOTE — ASSESSMENT & PLAN NOTE
Intermittent   Drinks at least 64 oz or more daily  Takes psyllium husk supplement.   Last colonoscopy done 2022 with 7 year recall  Pt has normal psa in 2022 repeat ordered.   Senna did not help, he does not want to try an enema/suppository, would like to try magnesium citrate.

## 2025-01-15 NOTE — ASSESSMENT & PLAN NOTE
Repeat fasting labs ordered  Lab Results   Component Value Date/Time    CHOLSTRLTOT 184 05/03/2024 06:31 AM     (H) 05/03/2024 06:31 AM    HDL 36 (A) 05/03/2024 06:31 AM    TRIGLYCERIDE 76 05/03/2024 06:31 AM       Lab Results   Component Value Date/Time    SODIUM 135 05/03/2024 06:31 AM    POTASSIUM 4.2 05/03/2024 06:31 AM    CHLORIDE 101 05/03/2024 06:31 AM    CO2 23 05/03/2024 06:31 AM    GLUCOSE 89 05/03/2024 06:31 AM    BUN 22 05/03/2024 06:31 AM    CREATININE 0.84 05/03/2024 06:31 AM     Lab Results   Component Value Date/Time    ALKPHOSPHAT 110 (H) 05/03/2024 06:31 AM    ASTSGOT 21 05/03/2024 06:31 AM    ALTSGPT 21 05/03/2024 06:31 AM    TBILIRUBIN 0.3 05/03/2024 06:31 AM      The 10-year ASCVD risk score (Ritu DEE, et al., 2019) is: 16.6%  Discussed medication for high cholesterol  He has never had a heart attack or stroke,   Working on healthy diet low in fried and saturated fats.

## 2025-01-15 NOTE — ASSESSMENT & PLAN NOTE
Had toenail removal and it is healing well, has no blood or pus and has appt with his podiatrist tomorrow.

## 2025-01-15 NOTE — ASSESSMENT & PLAN NOTE
Well controlled on lisinopril hctz 20-25 mg  Has no chest pain/sob/LE edema  Renal function labs ordered.

## 2025-01-22 NOTE — PROGRESS NOTES
Subjective:   Dwight Linares is a 59 y.o. male here today for evaluation and management of:     Hyperlipidemia  Repeat fasting labs ordered  Lab Results   Component Value Date/Time    CHOLSTRLTOT 184 05/03/2024 06:31 AM     (H) 05/03/2024 06:31 AM    HDL 36 (A) 05/03/2024 06:31 AM    TRIGLYCERIDE 76 05/03/2024 06:31 AM       Lab Results   Component Value Date/Time    SODIUM 135 05/03/2024 06:31 AM    POTASSIUM 4.2 05/03/2024 06:31 AM    CHLORIDE 101 05/03/2024 06:31 AM    CO2 23 05/03/2024 06:31 AM    GLUCOSE 89 05/03/2024 06:31 AM    BUN 22 05/03/2024 06:31 AM    CREATININE 0.84 05/03/2024 06:31 AM     Lab Results   Component Value Date/Time    ALKPHOSPHAT 110 (H) 05/03/2024 06:31 AM    ASTSGOT 21 05/03/2024 06:31 AM    ALTSGPT 21 05/03/2024 06:31 AM    TBILIRUBIN 0.3 05/03/2024 06:31 AM      The 10-year ASCVD risk score (Ritu DEE, et al., 2019) is: 16.6%  Discussed medication for high cholesterol  He has never had a heart attack or stroke,   Working on healthy diet low in fried and saturated fats.     Toe infection  Had toenail removal and it is healing well, has no blood or pus and has appt with his podiatrist tomorrow.     Hypertension  Well controlled on lisinopril hctz 20-25 mg  Has no chest pain/sob/LE edema  Renal function labs ordered.     Constipation  Intermittent   Drinks at least 64 oz or more daily  Takes psyllium husk supplement.   Last colonoscopy done 2022 with 7 year recall  Pt has normal psa in 2022 repeat ordered.   Senna did not help, he does not want to try an enema/suppository, would like to try magnesium citrate.            Current medicines (including changes today)  Current Outpatient Medications   Medication Sig Dispense Refill    rosuvastatin (CRESTOR) 5 MG Tab Take 5 mg by mouth every evening.      lisinopril-hydrochlorothiazide (PRINZIDE) 20-25 MG per tablet Take 1 Tablet by mouth every day. 1 Tablet 0     No current facility-administered medications for this visit.     He  " has a past medical history of Anxiety, Constipation (12/6/2023), Depression, High cholesterol, Hyperlipidemia, Hypertension (10/14/2014), and Pain.    He has no past medical history of Acute nasopharyngitis, Anesthesia, Anginal syndrome (HCC), Arrhythmia, Arthritis, Asthma, Blood clotting disorder (HCC), Breath shortness, Bronchitis, Carcinoma in situ of respiratory system, Cataract, Congestive heart failure (HCC), Continuous ambulatory peritoneal dialysis status (HCC), Coughing blood, Dental disorder, Diabetes (HCC), Dialysis patient (HCC), Disorder of thyroid, Emphysema of lung (HCC), Glaucoma, Gynecological disorder, Heart burn, Heart murmur, Heart valve disease, Hemorrhagic disorder (HCC), Hepatitis A, Hepatitis B, Hepatitis C, Hiatus hernia syndrome, Indigestion, Infectious disease, Jaundice, Myocardial infarct (HCC), Pacemaker, Pneumonia, Pregnant, Renal disorder, Rheumatic fever, Seizure (HCC), Sleep apnea, Stroke (HCC), Tuberculosis, Urinary bladder disorder, or Urinary incontinence.    ROS  No chest pain, no shortness of breath, no abdominal pain       Objective:     /70 (BP Location: Left arm, Patient Position: Sitting, BP Cuff Size: Adult long)   Pulse 67   Temp 36.4 °C (97.6 °F) (Temporal)   Resp 20   Ht 1.88 m (6' 2\")   Wt 119 kg (263 lb)   SpO2 93%  Body mass index is 33.77 kg/m².   Physical Exam:  Constitutional: Alert, no distress.  Skin: Warm, dry, good turgor, no rashes in visible areas.  Eye: Equal, round and reactive, conjunctiva clear, lids normal.  ENMT: Lips without lesions, good dentition, oropharynx clear.  Neck: Trachea midline, no masses, no thyromegaly. No cervical or supraclavicular lymphadenopathy  Respiratory: Unlabored respiratory effort, lungs clear to auscultation, no wheezes, no ronchi.  Cardiovascular: Normal S1, S2, no murmur, no edema.  Abdomen: Soft, non-tender, no masses, no hepatosplenomegaly.  Psych: Alert and oriented x3, normal affect and mood.    Assessment " and Plan:   The following treatment plan was discussed    1. Mixed hyperlipidemia  - Comp Metabolic Panel; Future  - Lipid Profile; Future    2. Toe infection    3. Primary hypertension    4. Constipation, unspecified constipation type    5. Screening for malignant neoplasm of prostate  - PROSTATE SPECIFIC AG SCREENING; Future    Other orders  - rosuvastatin (CRESTOR) 5 MG Tab; Take 5 mg by mouth every evening.      Followup: Return in about 3 months (around 4/15/2025) for Lab Review.

## 2025-03-13 ENCOUNTER — HOSPITAL ENCOUNTER (OUTPATIENT)
Dept: LAB | Facility: MEDICAL CENTER | Age: 60
End: 2025-03-13
Attending: FAMILY MEDICINE
Payer: OTHER GOVERNMENT

## 2025-03-13 DIAGNOSIS — E78.2 MIXED HYPERLIPIDEMIA: Chronic | ICD-10-CM

## 2025-03-13 DIAGNOSIS — Z12.5 SCREENING FOR MALIGNANT NEOPLASM OF PROSTATE: ICD-10-CM

## 2025-03-13 LAB
ALBUMIN SERPL BCP-MCNC: 4 G/DL (ref 3.2–4.9)
ALBUMIN/GLOB SERPL: 1.3 G/DL
ALP SERPL-CCNC: 110 U/L (ref 30–99)
ALT SERPL-CCNC: 22 U/L (ref 2–50)
ANION GAP SERPL CALC-SCNC: 11 MMOL/L (ref 7–16)
AST SERPL-CCNC: 30 U/L (ref 12–45)
BILIRUB SERPL-MCNC: 0.3 MG/DL (ref 0.1–1.5)
BUN SERPL-MCNC: 21 MG/DL (ref 8–22)
CALCIUM ALBUM COR SERPL-MCNC: 9.1 MG/DL (ref 8.5–10.5)
CALCIUM SERPL-MCNC: 9.1 MG/DL (ref 8.5–10.5)
CHLORIDE SERPL-SCNC: 103 MMOL/L (ref 96–112)
CHOLEST SERPL-MCNC: 134 MG/DL (ref 100–199)
CO2 SERPL-SCNC: 25 MMOL/L (ref 20–33)
CREAT SERPL-MCNC: 0.96 MG/DL (ref 0.5–1.4)
FASTING STATUS PATIENT QL REPORTED: NORMAL
GFR SERPLBLD CREATININE-BSD FMLA CKD-EPI: 90 ML/MIN/1.73 M 2
GLOBULIN SER CALC-MCNC: 3 G/DL (ref 1.9–3.5)
GLUCOSE SERPL-MCNC: 94 MG/DL (ref 65–99)
HDLC SERPL-MCNC: 34 MG/DL
LDLC SERPL CALC-MCNC: 86 MG/DL
POTASSIUM SERPL-SCNC: 4.2 MMOL/L (ref 3.6–5.5)
PROT SERPL-MCNC: 7 G/DL (ref 6–8.2)
PSA SERPL DL<=0.01 NG/ML-MCNC: 0.27 NG/ML (ref 0–4)
SODIUM SERPL-SCNC: 139 MMOL/L (ref 135–145)
TRIGL SERPL-MCNC: 72 MG/DL (ref 0–149)

## 2025-03-13 PROCEDURE — 80053 COMPREHEN METABOLIC PANEL: CPT

## 2025-03-13 PROCEDURE — 36415 COLL VENOUS BLD VENIPUNCTURE: CPT

## 2025-03-13 PROCEDURE — 80061 LIPID PANEL: CPT

## 2025-03-13 PROCEDURE — 84153 ASSAY OF PSA TOTAL: CPT

## 2025-03-21 ENCOUNTER — RESULTS FOLLOW-UP (OUTPATIENT)
Dept: MEDICAL GROUP | Facility: PHYSICIAN GROUP | Age: 60
End: 2025-03-21

## 2025-04-17 ENCOUNTER — OFFICE VISIT (OUTPATIENT)
Dept: MEDICAL GROUP | Facility: PHYSICIAN GROUP | Age: 60
End: 2025-04-17
Payer: OTHER GOVERNMENT

## 2025-04-17 VITALS
WEIGHT: 266 LBS | HEIGHT: 76 IN | BODY MASS INDEX: 32.39 KG/M2 | SYSTOLIC BLOOD PRESSURE: 132 MMHG | TEMPERATURE: 97.9 F | OXYGEN SATURATION: 96 % | DIASTOLIC BLOOD PRESSURE: 70 MMHG | HEART RATE: 96 BPM | RESPIRATION RATE: 16 BRPM

## 2025-04-17 DIAGNOSIS — I10 PRIMARY HYPERTENSION: Chronic | ICD-10-CM

## 2025-04-17 PROCEDURE — 3075F SYST BP GE 130 - 139MM HG: CPT | Performed by: FAMILY MEDICINE

## 2025-04-17 PROCEDURE — 3078F DIAST BP <80 MM HG: CPT | Performed by: FAMILY MEDICINE

## 2025-04-17 PROCEDURE — 99214 OFFICE O/P EST MOD 30 MIN: CPT | Performed by: FAMILY MEDICINE

## 2025-04-17 ASSESSMENT — FIBROSIS 4 INDEX: FIB4 SCORE: 1.41

## 2025-04-18 PROBLEM — H61.23 CERUMEN DEBRIS ON TYMPANIC MEMBRANE OF BOTH EARS: Status: RESOLVED | Noted: 2018-07-10 | Resolved: 2025-04-18

## 2025-04-18 NOTE — PROGRESS NOTES
Subjective:   Dwight Linares is a 59 y.o. male here today for evaluation and management of:     History of Present Illness  The patient presents for evaluation of hyperlipidemia and prostate cancer screening.    He reports inconsistent adherence to his prescribed medication regimen, often forgetting to take his 5 mg dose of Crestor. Despite this, a significant reduction in LDL cholesterol from 133 mg/dL in 05/2024 to 86 mg/dL is noted. Persistent muscle aches are mentioned, although no increase in liver enzymes is reported, indicating good tolerance to the medication.    A PSA level of 0.27 is reported, indicating no risk of prostate cancer.           Current medicines (including changes today)  Current Outpatient Medications   Medication Sig Dispense Refill    rosuvastatin (CRESTOR) 5 MG Tab Take 5 mg by mouth every evening.      lisinopril-hydrochlorothiazide (PRINZIDE) 20-25 MG per tablet Take 1 Tablet by mouth every day. 1 Tablet 0     No current facility-administered medications for this visit.     He  has a past medical history of Anxiety, Constipation (12/6/2023), Depression, High cholesterol, Hyperlipidemia, Hypertension (10/14/2014), and Pain.    He has no past medical history of Acute nasopharyngitis, Anesthesia, Anginal syndrome ([facility]), Arrhythmia, Arthritis, Asthma, Blood clotting disorder ([facility]), Breath shortness, Bronchitis, Carcinoma in situ of respiratory system, Cataract, Congestive heart failure (HCC), Continuous ambulatory peritoneal dialysis status ([facility]), Coughing blood, Dental disorder, Diabetes (HCC), Dialysis patient ([facility]), Disorder of thyroid, Emphysema of lung (HCC), Glaucoma, Gynecological disorder, Heart burn, Heart murmur, Heart valve disease, Hemorrhagic disorder (HCC), Hepatitis A, Hepatitis B, Hepatitis C, Hiatus hernia syndrome, Indigestion, Infectious disease, Jaundice, Myocardial infarct (HCC), Pacemaker, Pneumonia, Pregnant, Renal disorder, Rheumatic fever, Seizure (HCC), Sleep  "apnea, Stroke (HCC), Tuberculosis, Urinary bladder disorder, or Urinary incontinence.    ROS  No chest pain, no shortness of breath, no abdominal pain       Objective:     /70 (BP Location: Left arm, Patient Position: Sitting, BP Cuff Size: Adult long)   Pulse 96   Temp 36.6 °C (97.9 °F) (Temporal)   Resp 16   Ht 1.918 m (6' 3.5\")   Wt 121 kg (266 lb)   SpO2 96%  Body mass index is 32.81 kg/m².   Physical Exam:  Constitutional: Alert, no distress, well-groomed.  Skin: No rashes in visible areas.  Eye: Round. Conjunctiva clear, lids normal. No icterus.   ENMT: Lips pink without lesions, good dentition, moist mucous membranes. Phonation normal.  Neck: No masses, no thyromegaly. Moves freely without pain.  Respiratory: Unlabored respiratory effort, no cough or audible wheeze  Psych: Alert and oriented x3, normal affect and mood.         The following treatment plan was discussed  Assessment & Plan  1. Hyperlipidemia.  - LDL levels have decreased significantly from 133 in 05/2024 to 86, attributed to Crestor 5 mg.  - Reports no muscle aches, and liver enzymes remain stable, indicating good tolerance.  - Discussion included the effectiveness of the medication and the possibility of increasing the dose if tolerated.  - Advised to continue with the current dosage of Crestor 5 mg, with consideration for a higher dose if possible.    2. Prostate cancer screening.  - PSA level is 0.27, indicating no risk of prostate cancer.  - Physical exam findings do not necessitate a prostate exam at this time.  - Discussed that a prostate exam is unnecessary unless symptoms arise, but can be performed for psychological reassurance if desired.  - No further action required unless symptoms develop.    Follow-up  - Follow-up appointment scheduled in 3 months.    1. Primary hypertension      Followup: No follow-ups on file.  Verbal consent was acquired by the patient to use [employer] ambient listening note generation during this " visit Yes

## 2025-06-20 DIAGNOSIS — I10 PRIMARY HYPERTENSION: Chronic | ICD-10-CM

## 2025-06-20 RX ORDER — LISINOPRIL AND HYDROCHLOROTHIAZIDE 20; 25 MG/1; MG/1
1 TABLET ORAL
Qty: 100 TABLET | Refills: 3 | Status: SHIPPED | OUTPATIENT
Start: 2025-06-20

## 2025-06-20 NOTE — TELEPHONE ENCOUNTER
Received request via: Patient    Was the patient seen in the last year in this department? Yes    Does the patient have an active prescription (recently filled or refills available) for medication(s) requested? No    Pharm[facility] Name[facility]    Does the patient have [facility] Plus and need 100-day supply? (This applies to ALL medications) Patient does not have SCP

## 2025-08-28 DIAGNOSIS — L98.9 SKIN LESION: ICD-10-CM

## 2025-08-28 DIAGNOSIS — I10 PRIMARY HYPERTENSION: Chronic | ICD-10-CM

## 2025-08-28 DIAGNOSIS — E55.9 VITAMIN D DEFICIENCY: ICD-10-CM

## 2025-08-28 DIAGNOSIS — Z12.5 SCREENING FOR MALIGNANT NEOPLASM OF PROSTATE: ICD-10-CM

## 2025-08-28 DIAGNOSIS — E78.2 MIXED HYPERLIPIDEMIA: Chronic | ICD-10-CM

## 2025-08-28 DIAGNOSIS — R73.01 ELEVATED FASTING GLUCOSE: Primary | ICD-10-CM

## 2025-08-28 RX ORDER — HYDROCHLOROTHIAZIDE 25 MG/1
25 TABLET ORAL DAILY
Qty: 100 TABLET | Refills: 3 | Status: SHIPPED | OUTPATIENT
Start: 2025-08-28

## 2025-08-28 RX ORDER — KETOCONAZOLE 20 MG/G
1 CREAM TOPICAL 2 TIMES DAILY
Qty: 60 G | Refills: 0 | Status: SHIPPED | OUTPATIENT
Start: 2025-08-28

## 2025-08-28 RX ORDER — LOSARTAN POTASSIUM 50 MG/1
50 TABLET ORAL DAILY
Qty: 100 TABLET | Refills: 3 | Status: SHIPPED | OUTPATIENT
Start: 2025-08-28 | End: 2026-10-02

## 2025-08-29 ENCOUNTER — HOSPITAL ENCOUNTER (OUTPATIENT)
Dept: LAB | Facility: MEDICAL CENTER | Age: 60
End: 2025-08-29
Attending: FAMILY MEDICINE
Payer: OTHER GOVERNMENT

## 2025-08-29 DIAGNOSIS — E78.2 MIXED HYPERLIPIDEMIA: Chronic | ICD-10-CM

## 2025-08-29 DIAGNOSIS — R73.01 ELEVATED FASTING GLUCOSE: ICD-10-CM

## 2025-08-29 DIAGNOSIS — L98.9 SKIN LESION: ICD-10-CM

## 2025-08-29 DIAGNOSIS — Z12.5 SCREENING FOR MALIGNANT NEOPLASM OF PROSTATE: ICD-10-CM

## 2025-08-29 DIAGNOSIS — E55.9 VITAMIN D DEFICIENCY: ICD-10-CM

## 2025-08-29 LAB
25(OH)D3 SERPL-MCNC: 28 NG/ML (ref 30–100)
ALBUMIN SERPL BCP-MCNC: 4 G/DL (ref 3.2–4.9)
ALBUMIN/GLOB SERPL: 1.4 G/DL
ALP SERPL-CCNC: 116 U/L (ref 30–99)
ALT SERPL-CCNC: 13 U/L (ref 2–50)
ANION GAP SERPL CALC-SCNC: 11 MMOL/L (ref 7–16)
AST SERPL-CCNC: 23 U/L (ref 12–45)
BASOPHILS # BLD AUTO: 0.4 % (ref 0–1.8)
BASOPHILS # BLD: 0.02 K/UL (ref 0–0.12)
BILIRUB SERPL-MCNC: 0.3 MG/DL (ref 0.1–1.5)
BUN SERPL-MCNC: 18 MG/DL (ref 8–22)
CALCIUM ALBUM COR SERPL-MCNC: 8.7 MG/DL (ref 8.5–10.5)
CALCIUM SERPL-MCNC: 8.7 MG/DL (ref 8.5–10.5)
CHLORIDE SERPL-SCNC: 102 MMOL/L (ref 96–112)
CHOLEST SERPL-MCNC: 170 MG/DL (ref 100–199)
CO2 SERPL-SCNC: 22 MMOL/L (ref 20–33)
CREAT SERPL-MCNC: 0.85 MG/DL (ref 0.5–1.4)
EOSINOPHIL # BLD AUTO: 0.05 K/UL (ref 0–0.51)
EOSINOPHIL NFR BLD: 0.9 % (ref 0–6.9)
ERYTHROCYTE [DISTWIDTH] IN BLOOD BY AUTOMATED COUNT: 49.9 FL (ref 35.9–50)
EST. AVERAGE GLUCOSE BLD GHB EST-MCNC: 105 MG/DL
FASTING STATUS PATIENT QL REPORTED: NORMAL
GFR SERPLBLD CREATININE-BSD FMLA CKD-EPI: 99 ML/MIN/1.73 M 2
GLOBULIN SER CALC-MCNC: 2.9 G/DL (ref 1.9–3.5)
GLUCOSE SERPL-MCNC: 95 MG/DL (ref 65–99)
HBA1C MFR BLD: 5.3 % (ref 4–5.6)
HCT VFR BLD AUTO: 46.5 % (ref 42–52)
HDLC SERPL-MCNC: 34 MG/DL
HGB BLD-MCNC: 15.7 G/DL (ref 14–18)
IMM GRANULOCYTES # BLD AUTO: 0.02 K/UL (ref 0–0.11)
IMM GRANULOCYTES NFR BLD AUTO: 0.4 % (ref 0–0.9)
LDLC SERPL CALC-MCNC: 119 MG/DL
LYMPHOCYTES # BLD AUTO: 1.26 K/UL (ref 1–4.8)
LYMPHOCYTES NFR BLD: 23.1 % (ref 22–41)
MCH RBC QN AUTO: 32.1 PG (ref 27–33)
MCHC RBC AUTO-ENTMCNC: 33.8 G/DL (ref 32.3–36.5)
MCV RBC AUTO: 95.1 FL (ref 81.4–97.8)
MONOCYTES # BLD AUTO: 0.61 K/UL (ref 0–0.85)
MONOCYTES NFR BLD AUTO: 11.2 % (ref 0–13.4)
NEUTROPHILS # BLD AUTO: 3.49 K/UL (ref 1.82–7.42)
NEUTROPHILS NFR BLD: 64 % (ref 44–72)
NRBC # BLD AUTO: 0 K/UL
NRBC BLD-RTO: 0 /100 WBC (ref 0–0.2)
PLATELET # BLD AUTO: 276 K/UL (ref 164–446)
PMV BLD AUTO: 9.9 FL (ref 9–12.9)
POTASSIUM SERPL-SCNC: 4 MMOL/L (ref 3.6–5.5)
PROT SERPL-MCNC: 6.9 G/DL (ref 6–8.2)
PSA SERPL DL<=0.01 NG/ML-MCNC: 0.23 NG/ML (ref 0–4)
RBC # BLD AUTO: 4.89 M/UL (ref 4.7–6.1)
SODIUM SERPL-SCNC: 135 MMOL/L (ref 135–145)
TRIGL SERPL-MCNC: 86 MG/DL (ref 0–149)
WBC # BLD AUTO: 5.5 K/UL (ref 4.8–10.8)

## 2025-08-29 PROCEDURE — 84153 ASSAY OF PSA TOTAL: CPT

## 2025-08-29 PROCEDURE — 82306 VITAMIN D 25 HYDROXY: CPT

## 2025-08-29 PROCEDURE — 83036 HEMOGLOBIN GLYCOSYLATED A1C: CPT

## 2025-08-29 PROCEDURE — 85025 COMPLETE CBC W/AUTO DIFF WBC: CPT

## 2025-08-29 PROCEDURE — 80053 COMPREHEN METABOLIC PANEL: CPT

## 2025-08-29 PROCEDURE — 36415 COLL VENOUS BLD VENIPUNCTURE: CPT

## 2025-08-29 PROCEDURE — 80061 LIPID PANEL: CPT

## 2025-09-02 ENCOUNTER — APPOINTMENT (OUTPATIENT)
Dept: MEDICAL GROUP | Facility: PHYSICIAN GROUP | Age: 60
End: 2025-09-02
Payer: OTHER GOVERNMENT

## 2025-09-10 ENCOUNTER — APPOINTMENT (OUTPATIENT)
Dept: MEDICAL GROUP | Facility: PHYSICIAN GROUP | Age: 60
End: 2025-09-10
Payer: OTHER GOVERNMENT

## (undated) DEVICE — INSTRUMENT SIGMOIDOSCOPIC - SUCTION (20/CA)

## (undated) DEVICE — SIGMOIDOSCOPE DISP. - (25/BX)

## (undated) DEVICE — WATER IRRIGATION STERILE 1000ML (12EA/CA)

## (undated) DEVICE — BAG SPONGE COUNT 10.25 X 32 - BLUE (250/CA)

## (undated) DEVICE — TOWEL STOP TIMEOUT SAFETY FLAG (40EA/CA)

## (undated) DEVICE — SYRINGE 10 ML CONTROL LL (25EA/BX 4BX/CA)

## (undated) DEVICE — GOWN WARMING STANDARD FLEX - (30/CA)

## (undated) DEVICE — SENSOR OXIMETER ADULT SPO2 RD SET (20EA/BX)

## (undated) DEVICE — ELECTRODE DUAL RETURN W/ CORD - (50/PK)

## (undated) DEVICE — SPONGE GAUZESTER 4 X 4 4PLY - (128PK/CA)

## (undated) DEVICE — SODIUM CHL IRRIGATION 0.9% 1000ML (12EA/CA)

## (undated) DEVICE — TUBE CONNECTING SUCTION - CLEAR PLASTIC STERILE 72 IN (50EA/CA)

## (undated) DEVICE — CANISTER SUCTION RIGID RED 1500CC (40EA/CA)

## (undated) DEVICE — JELLY SURGILUBE STERILE FOIL 5 GM (150EA/BX)

## (undated) DEVICE — COVER LIGHT HANDLE FLEXIBLE - SOFT (2EA/PK 80PK/CA)

## (undated) DEVICE — GLOVE BIOGEL SZ 8 SURGICAL PF LTX - (50PR/BX 4BX/CA)

## (undated) DEVICE — SUCTION INSTRUMENT YANKAUER BULBOUS TIP W/O VENT (50EA/CA)

## (undated) DEVICE — SYRINGE ASEPTO - (50EA/CA

## (undated) DEVICE — HUMID-VENT HEAT AND MOISTURE EXCHANGE- (50/BX)

## (undated) DEVICE — LACTATED RINGERS INJ 1000 ML - (14EA/CA 60CA/PF)

## (undated) DEVICE — PAD LAP STERILE 18 X 18 - (5/PK 40PK/CA)